# Patient Record
Sex: MALE | Race: WHITE
[De-identification: names, ages, dates, MRNs, and addresses within clinical notes are randomized per-mention and may not be internally consistent; named-entity substitution may affect disease eponyms.]

---

## 2019-06-04 ENCOUNTER — APPOINTMENT (OUTPATIENT)
Dept: CARDIOLOGY | Facility: CLINIC | Age: 84
End: 2019-06-04
Payer: MEDICARE

## 2019-06-04 ENCOUNTER — NON-APPOINTMENT (OUTPATIENT)
Age: 84
End: 2019-06-04

## 2019-06-04 VITALS
WEIGHT: 150 LBS | OXYGEN SATURATION: 95 % | HEART RATE: 68 BPM | SYSTOLIC BLOOD PRESSURE: 145 MMHG | HEIGHT: 66 IN | DIASTOLIC BLOOD PRESSURE: 82 MMHG | BODY MASS INDEX: 24.11 KG/M2

## 2019-06-04 DIAGNOSIS — I10 ESSENTIAL (PRIMARY) HYPERTENSION: ICD-10-CM

## 2019-06-04 DIAGNOSIS — R09.89 OTHER SPECIFIED SYMPTOMS AND SIGNS INVOLVING THE CIRCULATORY AND RESPIRATORY SYSTEMS: ICD-10-CM

## 2019-06-04 DIAGNOSIS — I73.9 PERIPHERAL VASCULAR DISEASE, UNSPECIFIED: ICD-10-CM

## 2019-06-04 DIAGNOSIS — E78.00 PURE HYPERCHOLESTEROLEMIA, UNSPECIFIED: ICD-10-CM

## 2019-06-04 DIAGNOSIS — I25.10 ATHEROSCLEROTIC HEART DISEASE OF NATIVE CORONARY ARTERY W/OUT ANGINA PECTORIS: ICD-10-CM

## 2019-06-04 PROCEDURE — 99204 OFFICE O/P NEW MOD 45 MIN: CPT

## 2019-06-04 PROCEDURE — 93000 ELECTROCARDIOGRAM COMPLETE: CPT

## 2019-06-04 RX ORDER — METOPROLOL SUCCINATE 25 MG/1
25 TABLET, EXTENDED RELEASE ORAL
Qty: 45 | Refills: 0 | Status: ACTIVE | COMMUNITY
Start: 2019-03-08

## 2019-06-04 NOTE — HISTORY OF PRESENT ILLNESS
[FreeTextEntry1] : I saw Hardy Gallardo in the office today for a followup visit. He is an 89-year-old white male who underwent four-vessel bypass graft surgery in 1997 with LIMA to the LAD, and vein graft to the diagonal, ramus, and obtuse marginal branches. He had a nuclear stress test in 2010 that showed no ischemia. Carotid Doppler 9/13 showed mild nonobstructing plaque, no change compared to 2008.I last saw the patient in 2014.\par \par  Blood work dated 10/14 demonstrates a total cholesterol 165, triglycerides 44, HDL 66, and LDL 90.\par \par Or recently the patient has not been walking around size because he is lazy and his difficulty with his balance. Several weeks she had a bad cough and was complaining of some indigestion type symptoms in his chest. Since the cough has resolved he is having no chest pain, shortness of breath, or cough.\par Resting twelve-lead electrocardiogram performed today demonstrates sinus rhythm. There are no acute changes.\par \par The patient had lower extremity Dopplers 9/13. This showed moderate plaque of the distal right superficial femoral artery with a 50-99% area of obstruction. Left side showed moderate plaque without obstruction. \par Echocardiogram performed 11/14 demonstrated an ejection fraction of 60%. There is mild to moderate MR and TR with PA pressure of 40.

## 2019-06-04 NOTE — PHYSICAL EXAM
[Well Groomed] : well groomed [General Appearance - Well Developed] : well developed [Normal Appearance] : normal appearance [General Appearance - Well Nourished] : well nourished [No Deformities] : no deformities [General Appearance - In No Acute Distress] : no acute distress [Normal Conjunctiva] : the conjunctiva exhibited no abnormalities [Normal Oral Mucosa] : normal oral mucosa [Normal Jugular Venous A Waves Present] : normal jugular venous A waves present [Normal Jugular Venous V Waves Present] : normal jugular venous V waves present [No Jugular Venous Prescott A Waves] : no jugular venous prescott A waves [] : no respiratory distress [Respiration, Rhythm And Depth] : normal respiratory rhythm and effort [Bowel Sounds] : normal bowel sounds [Abdomen Soft] : soft [Exaggerated Use Of Accessory Muscles For Inspiration] : no accessory muscle use [Auscultation Breath Sounds / Voice Sounds] : lungs were clear to auscultation bilaterally [Abdomen Tenderness] : non-tender [Nail Clubbing] : no clubbing of the fingernails [Abnormal Walk] : normal gait [Skin Color & Pigmentation] : normal skin color and pigmentation [Oriented To Time, Place, And Person] : oriented to person, place, and time [Impaired Insight] : insight and judgment were intact [No Anxiety] : not feeling anxious [Normal Rate] : normal [Normal S2] : normal S2 [Normal S1] : normal S1 [No Murmur] : no murmurs heard [2+] : left 2+ [1+] : right 1+ [No Abnormalities] : the abdominal aorta was not enlarged and no bruit was heard [No Pitting Edema] : no pitting edema present [S3] : no S3 [S4] : no S4 [Right Carotid Bruit] : no bruit heard over the right carotid [Left Femoral Bruit] : no bruit heard over the left femoral artery [Right Femoral Bruit] : no bruit heard over the right femoral artery [Left Carotid Bruit] : no bruit heard over the left carotid

## 2019-06-04 NOTE — DISCUSSION/SUMMARY
[FreeTextEntry1] : Clinically the patient is doing well without any signs or symptoms of active heart disease. His exam is essentially normal. ECG is unchanged.\par \par The patient needs to start doing some mild walking. If he develops any symptoms with walking he'll let me know. He will be careful with his balance  and if necessary he'll use a cane or walker.\par \par I would appreciate your sending a copy of his most recent bloodwork my review. He will schedule a carotid Doppler and echocardiogram to check on his heart structurally and to check on his vascular system. He has no leg claudication.\par \par He'll stay on his same medication. If he has any problems he will call me. Otherwise I would see him in one year.

## 2019-06-04 NOTE — REVIEW OF SYSTEMS
[Feeling Fatigued] : feeling fatigued [Urinary Frequency] : urinary frequency [Joint Pain] : joint pain [Dizziness] : dizziness [Negative] : Respiratory [Fever] : no fever [Headache] : no headache [Chills] : no chills [Earache] : no earache [Sore Throat] : no sore throat [Sinus Pressure] : no sinus pressure [Shortness Of Breath] : no shortness of breath [Chest  Pressure] : no chest pressure [Chest Pain] : no chest pain [Lower Ext Edema] : no extremity edema [Leg Claudication] : no intermittent leg claudication [Abdominal Pain] : no abdominal pain [Palpitations] : no palpitations [Nausea] : no nausea [Heartburn] : no heartburn [Change in Appetite] : no change in appetite [Hematuria] : no hematuria [Dysphagia] : no dysphagia [Skin Lesions] : no skin lesions [Skin: A Rash] : no rash: [Tremor] : no tremor was seen [Convulsions] : no convulsions [Confusion] : no confusion was observed [Excessive Thirst] : no polydipsia [Easy Bleeding] : no tendency for easy bleeding [Easy Bruising] : no tendency for easy bruising

## 2019-06-19 ENCOUNTER — APPOINTMENT (OUTPATIENT)
Dept: CARDIOLOGY | Facility: CLINIC | Age: 84
End: 2019-06-19
Payer: MEDICARE

## 2019-06-19 PROCEDURE — 93306 TTE W/DOPPLER COMPLETE: CPT

## 2019-06-25 ENCOUNTER — APPOINTMENT (OUTPATIENT)
Dept: CARDIOLOGY | Facility: CLINIC | Age: 84
End: 2019-06-25
Payer: MEDICARE

## 2019-06-25 PROCEDURE — 93880 EXTRACRANIAL BILAT STUDY: CPT

## 2022-03-27 ENCOUNTER — INPATIENT (INPATIENT)
Facility: HOSPITAL | Age: 87
LOS: 2 days | Discharge: HOME | DRG: 683 | End: 2022-03-30
Attending: HOSPITALIST | Admitting: INTERNAL MEDICINE
Payer: MEDICARE

## 2022-03-27 VITALS
HEART RATE: 72 BPM | RESPIRATION RATE: 18 BRPM | SYSTOLIC BLOOD PRESSURE: 105 MMHG | TEMPERATURE: 98 F | DIASTOLIC BLOOD PRESSURE: 62 MMHG | OXYGEN SATURATION: 93 %

## 2022-03-27 DIAGNOSIS — M62.82 RHABDOMYOLYSIS: ICD-10-CM

## 2022-03-27 LAB
ALBUMIN SERPL ELPH-MCNC: 3.3 G/DL — SIGNIFICANT CHANGE UP (ref 3.3–5)
ALP SERPL-CCNC: 67 U/L — SIGNIFICANT CHANGE UP (ref 40–120)
ALT FLD-CCNC: 29 U/L — SIGNIFICANT CHANGE UP (ref 12–78)
ANION GAP SERPL CALC-SCNC: 3 MMOL/L — LOW (ref 5–17)
APPEARANCE UR: ABNORMAL
APTT BLD: 29.5 SEC — SIGNIFICANT CHANGE UP (ref 27.5–35.5)
AST SERPL-CCNC: 85 U/L — HIGH (ref 15–37)
BASOPHILS # BLD AUTO: 0.08 K/UL — SIGNIFICANT CHANGE UP (ref 0–0.2)
BASOPHILS NFR BLD AUTO: 1 % — SIGNIFICANT CHANGE UP (ref 0–2)
BILIRUB SERPL-MCNC: 1 MG/DL — SIGNIFICANT CHANGE UP (ref 0.2–1.2)
BILIRUB UR-MCNC: NEGATIVE — SIGNIFICANT CHANGE UP
BUN SERPL-MCNC: 38 MG/DL — HIGH (ref 7–23)
CALCIUM SERPL-MCNC: 8.8 MG/DL — SIGNIFICANT CHANGE UP (ref 8.5–10.1)
CHLORIDE SERPL-SCNC: 110 MMOL/L — HIGH (ref 96–108)
CK SERPL-CCNC: 4120 U/L — HIGH (ref 26–308)
CO2 SERPL-SCNC: 27 MMOL/L — SIGNIFICANT CHANGE UP (ref 22–31)
COLOR SPEC: ABNORMAL
CREAT SERPL-MCNC: 1.54 MG/DL — HIGH (ref 0.5–1.3)
DIFF PNL FLD: ABNORMAL
EGFR: 41 ML/MIN/1.73M2 — LOW
EOSINOPHIL # BLD AUTO: 0 K/UL — SIGNIFICANT CHANGE UP (ref 0–0.5)
GLUCOSE SERPL-MCNC: 89 MG/DL — SIGNIFICANT CHANGE UP (ref 70–99)
GLUCOSE UR QL: NEGATIVE — SIGNIFICANT CHANGE UP
HCT VFR BLD CALC: 40.9 % — SIGNIFICANT CHANGE UP (ref 39–50)
HGB BLD-MCNC: 13.8 G/DL — SIGNIFICANT CHANGE UP (ref 13–17)
IMM GRANULOCYTES NFR BLD AUTO: 14 % — HIGH (ref 0–1.5)
INR BLD: 1.25 RATIO — HIGH (ref 0.88–1.16)
KETONES UR-MCNC: ABNORMAL
LACTATE SERPL-SCNC: 2.4 MMOL/L — HIGH (ref 0.7–2)
LEUKOCYTE ESTERASE UR-ACNC: ABNORMAL
LYMPHOCYTES # BLD AUTO: 0.32 K/UL — LOW (ref 1–3.3)
LYMPHOCYTES # BLD AUTO: 2 % — LOW (ref 13–44)
MCHC RBC-ENTMCNC: 32.6 PG — SIGNIFICANT CHANGE UP (ref 27–34)
MCHC RBC-ENTMCNC: 33.7 GM/DL — SIGNIFICANT CHANGE UP (ref 32–36)
MCV RBC AUTO: 96.7 FL — SIGNIFICANT CHANGE UP (ref 80–100)
MONOCYTES # BLD AUTO: 1.15 K/UL — HIGH (ref 0–0.9)
MONOCYTES NFR BLD AUTO: 7 % — SIGNIFICANT CHANGE UP (ref 2–14)
NEUTROPHILS # BLD AUTO: 22.4 K/UL — HIGH (ref 1.8–7.4)
NEUTROPHILS NFR BLD AUTO: 76 % — SIGNIFICANT CHANGE UP (ref 43–77)
NITRITE UR-MCNC: POSITIVE
PH UR: 8 — SIGNIFICANT CHANGE UP (ref 5–8)
PLATELET # BLD AUTO: 163 K/UL — SIGNIFICANT CHANGE UP (ref 150–400)
POTASSIUM SERPL-MCNC: 5.3 MMOL/L — SIGNIFICANT CHANGE UP (ref 3.5–5.3)
POTASSIUM SERPL-SCNC: 5.3 MMOL/L — SIGNIFICANT CHANGE UP (ref 3.5–5.3)
PROT SERPL-MCNC: 7 GM/DL — SIGNIFICANT CHANGE UP (ref 6–8.3)
PROT UR-MCNC: 100
PROTHROM AB SERPL-ACNC: 14.5 SEC — HIGH (ref 10.5–13.4)
RAPID RVP RESULT: SIGNIFICANT CHANGE UP
RBC # BLD: 4.23 M/UL — SIGNIFICANT CHANGE UP (ref 4.2–5.8)
RBC # FLD: 14.2 % — SIGNIFICANT CHANGE UP (ref 10.3–14.5)
SARS-COV-2 RNA SPEC QL NAA+PROBE: SIGNIFICANT CHANGE UP
SODIUM SERPL-SCNC: 140 MMOL/L — SIGNIFICANT CHANGE UP (ref 135–145)
SP GR SPEC: 1.01 — SIGNIFICANT CHANGE UP (ref 1.01–1.02)
TROPONIN I, HIGH SENSITIVITY RESULT: 154.16 NG/L — HIGH
TROPONIN I, HIGH SENSITIVITY RESULT: 182.32 NG/L — HIGH
UROBILINOGEN FLD QL: 1
WBC # BLD: 24.19 K/UL — HIGH (ref 3.8–10.5)
WBC # FLD AUTO: 24.19 K/UL — HIGH (ref 3.8–10.5)

## 2022-03-27 PROCEDURE — 73130 X-RAY EXAM OF HAND: CPT | Mod: 26,RT

## 2022-03-27 PROCEDURE — 97163 PT EVAL HIGH COMPLEX 45 MIN: CPT | Mod: GP

## 2022-03-27 PROCEDURE — 71260 CT THORAX DX C+: CPT | Mod: 26,MA

## 2022-03-27 PROCEDURE — 85027 COMPLETE CBC AUTOMATED: CPT

## 2022-03-27 PROCEDURE — 84439 ASSAY OF FREE THYROXINE: CPT

## 2022-03-27 PROCEDURE — 83735 ASSAY OF MAGNESIUM: CPT

## 2022-03-27 PROCEDURE — 93010 ELECTROCARDIOGRAM REPORT: CPT

## 2022-03-27 PROCEDURE — 97530 THERAPEUTIC ACTIVITIES: CPT | Mod: GP

## 2022-03-27 PROCEDURE — 72125 CT NECK SPINE W/O DYE: CPT | Mod: 26,MG

## 2022-03-27 PROCEDURE — 84443 ASSAY THYROID STIM HORMONE: CPT

## 2022-03-27 PROCEDURE — 70450 CT HEAD/BRAIN W/O DYE: CPT | Mod: 26,MG

## 2022-03-27 PROCEDURE — 84100 ASSAY OF PHOSPHORUS: CPT

## 2022-03-27 PROCEDURE — 82550 ASSAY OF CK (CPK): CPT

## 2022-03-27 PROCEDURE — 74177 CT ABD & PELVIS W/CONTRAST: CPT | Mod: 26,MA

## 2022-03-27 PROCEDURE — 84484 ASSAY OF TROPONIN QUANT: CPT

## 2022-03-27 PROCEDURE — 93306 TTE W/DOPPLER COMPLETE: CPT

## 2022-03-27 PROCEDURE — 99285 EMERGENCY DEPT VISIT HI MDM: CPT

## 2022-03-27 PROCEDURE — 94640 AIRWAY INHALATION TREATMENT: CPT

## 2022-03-27 PROCEDURE — 80048 BASIC METABOLIC PNL TOTAL CA: CPT

## 2022-03-27 PROCEDURE — 71045 X-RAY EXAM CHEST 1 VIEW: CPT | Mod: 26

## 2022-03-27 PROCEDURE — 84480 ASSAY TRIIODOTHYRONINE (T3): CPT

## 2022-03-27 PROCEDURE — 36415 COLL VENOUS BLD VENIPUNCTURE: CPT

## 2022-03-27 PROCEDURE — 86077 PHYS BLOOD BANK SERV XMATCH: CPT

## 2022-03-27 PROCEDURE — 87086 URINE CULTURE/COLONY COUNT: CPT

## 2022-03-27 PROCEDURE — 86870 RBC ANTIBODY IDENTIFICATION: CPT

## 2022-03-27 PROCEDURE — G1004: CPT

## 2022-03-27 PROCEDURE — 83605 ASSAY OF LACTIC ACID: CPT

## 2022-03-27 PROCEDURE — 93005 ELECTROCARDIOGRAM TRACING: CPT

## 2022-03-27 PROCEDURE — 99223 1ST HOSP IP/OBS HIGH 75: CPT

## 2022-03-27 PROCEDURE — 73564 X-RAY EXAM KNEE 4 OR MORE: CPT | Mod: 26,RT

## 2022-03-27 PROCEDURE — 97116 GAIT TRAINING THERAPY: CPT | Mod: GP

## 2022-03-27 RX ORDER — SODIUM CHLORIDE 9 MG/ML
1000 INJECTION INTRAMUSCULAR; INTRAVENOUS; SUBCUTANEOUS ONCE
Refills: 0 | Status: COMPLETED | OUTPATIENT
Start: 2022-03-27 | End: 2022-03-27

## 2022-03-27 RX ORDER — KETOROLAC TROMETHAMINE 0.5 %
1 DROPS OPHTHALMIC (EYE)
Refills: 0 | Status: DISCONTINUED | OUTPATIENT
Start: 2022-03-27 | End: 2022-03-30

## 2022-03-27 RX ORDER — TETANUS TOXOID, REDUCED DIPHTHERIA TOXOID AND ACELLULAR PERTUSSIS VACCINE, ADSORBED 5; 2.5; 8; 8; 2.5 [IU]/.5ML; [IU]/.5ML; UG/.5ML; UG/.5ML; UG/.5ML
0.5 SUSPENSION INTRAMUSCULAR ONCE
Refills: 0 | Status: COMPLETED | OUTPATIENT
Start: 2022-03-27 | End: 2022-03-27

## 2022-03-27 RX ORDER — ASPIRIN/CALCIUM CARB/MAGNESIUM 324 MG
81 TABLET ORAL DAILY
Refills: 0 | Status: DISCONTINUED | OUTPATIENT
Start: 2022-03-27 | End: 2022-03-30

## 2022-03-27 RX ORDER — FLUOROMETHOLONE 1 MG/ML
1 SOLUTION/ DROPS OPHTHALMIC
Qty: 0 | Refills: 0 | DISCHARGE

## 2022-03-27 RX ORDER — HEPARIN SODIUM 5000 [USP'U]/ML
5000 INJECTION INTRAVENOUS; SUBCUTANEOUS EVERY 12 HOURS
Refills: 0 | Status: DISCONTINUED | OUTPATIENT
Start: 2022-03-27 | End: 2022-03-28

## 2022-03-27 RX ORDER — CEFTRIAXONE 500 MG/1
1000 INJECTION, POWDER, FOR SOLUTION INTRAMUSCULAR; INTRAVENOUS ONCE
Refills: 0 | Status: COMPLETED | OUTPATIENT
Start: 2022-03-27 | End: 2022-03-27

## 2022-03-27 RX ORDER — TAMSULOSIN HYDROCHLORIDE 0.4 MG/1
1 CAPSULE ORAL
Qty: 0 | Refills: 0 | DISCHARGE

## 2022-03-27 RX ORDER — KETOROLAC TROMETHAMINE 0.5 %
1 DROPS OPHTHALMIC (EYE)
Qty: 0 | Refills: 0 | DISCHARGE

## 2022-03-27 RX ORDER — METOPROLOL TARTRATE 50 MG
0.5 TABLET ORAL
Qty: 0 | Refills: 0 | DISCHARGE

## 2022-03-27 RX ORDER — SIMVASTATIN 20 MG/1
40 TABLET, FILM COATED ORAL AT BEDTIME
Refills: 0 | Status: DISCONTINUED | OUTPATIENT
Start: 2022-03-27 | End: 2022-03-30

## 2022-03-27 RX ORDER — TOBRAMYCIN AND DEXAMETHASONE 1; 3 MG/ML; MG/ML
2 SUSPENSION/ DROPS OPHTHALMIC
Qty: 0 | Refills: 0 | DISCHARGE

## 2022-03-27 RX ORDER — TAMSULOSIN HYDROCHLORIDE 0.4 MG/1
0.4 CAPSULE ORAL AT BEDTIME
Refills: 0 | Status: DISCONTINUED | OUTPATIENT
Start: 2022-03-27 | End: 2022-03-30

## 2022-03-27 RX ORDER — METOPROLOL TARTRATE 50 MG
12.5 TABLET ORAL DAILY
Refills: 0 | Status: DISCONTINUED | OUTPATIENT
Start: 2022-03-27 | End: 2022-03-30

## 2022-03-27 RX ORDER — ASPIRIN/CALCIUM CARB/MAGNESIUM 324 MG
1 TABLET ORAL
Qty: 0 | Refills: 0 | DISCHARGE

## 2022-03-27 RX ORDER — SODIUM CHLORIDE 9 MG/ML
1000 INJECTION INTRAMUSCULAR; INTRAVENOUS; SUBCUTANEOUS
Refills: 0 | Status: DISCONTINUED | OUTPATIENT
Start: 2022-03-27 | End: 2022-03-28

## 2022-03-27 RX ORDER — CEFTRIAXONE 500 MG/1
1000 INJECTION, POWDER, FOR SOLUTION INTRAMUSCULAR; INTRAVENOUS EVERY 24 HOURS
Refills: 0 | Status: DISCONTINUED | OUTPATIENT
Start: 2022-03-28 | End: 2022-03-30

## 2022-03-27 RX ADMIN — TAMSULOSIN HYDROCHLORIDE 0.4 MILLIGRAM(S): 0.4 CAPSULE ORAL at 22:12

## 2022-03-27 RX ADMIN — Medication 1 DROP(S): at 23:09

## 2022-03-27 RX ADMIN — SIMVASTATIN 40 MILLIGRAM(S): 20 TABLET, FILM COATED ORAL at 22:12

## 2022-03-27 RX ADMIN — SODIUM CHLORIDE 1000 MILLILITER(S): 9 INJECTION INTRAMUSCULAR; INTRAVENOUS; SUBCUTANEOUS at 16:54

## 2022-03-27 RX ADMIN — SODIUM CHLORIDE 1000 MILLILITER(S): 9 INJECTION INTRAMUSCULAR; INTRAVENOUS; SUBCUTANEOUS at 16:18

## 2022-03-27 RX ADMIN — CEFTRIAXONE 100 MILLIGRAM(S): 500 INJECTION, POWDER, FOR SOLUTION INTRAMUSCULAR; INTRAVENOUS at 16:54

## 2022-03-27 RX ADMIN — TETANUS TOXOID, REDUCED DIPHTHERIA TOXOID AND ACELLULAR PERTUSSIS VACCINE, ADSORBED 0.5 MILLILITER(S): 5; 2.5; 8; 8; 2.5 SUSPENSION INTRAMUSCULAR at 16:28

## 2022-03-27 NOTE — ED ADULT NURSE NOTE - OBJECTIVE STATEMENT
Pt presents to ED with daughter s/p fall. Pt was found by daughter at around 130pm today and daughter states that patient fell at midnight today and has been on the floor since. Pt was found on the wooden floor in bed room next to his bed. Pt states that he got out of his bed and fell. Abrasions noted to right hand and right knee. Pt said he felt too weak to get up. Denies any pain, SOB, chest pain, fever/chills, dizziness. Pt on ASA.

## 2022-03-27 NOTE — ED PROVIDER NOTE - PROGRESS NOTE DETAILS
89 y/o M presents s/p fall. Pt reports falling at midnight last night, does not recall how he fell. Was too weak to get out of bed on his own. Daughter found patient on the floor at 1330 today. Pt with abrasion to R hand and R knee. No other complaints at this time -Luis Richter PA-C Discussed with Dr. baez who will admit. -Luis Richter PA-C

## 2022-03-27 NOTE — ED ADULT TRIAGE NOTE - BP NONINVASIVE SYSTOLIC (MM HG)
[facility], Emergency Department    [address] [address]    Phone:  [phone]                                       Kwasi Castellanos   MRN: [id number]    Department:  [facility], Emergency Department   Date of Visit:  11/11/2017           Patient Information     Date Of Birth          1992        Your diagnoses for this visit were:     Facial laceration, initial encounter        You were seen by Brien Trujillo MD.      Follow-up Information     Follow up with [facility], Emergency Department In 5 days.    Specialty:  EMERGENCY MEDICINE    Why:  For suture removal    Contact information:    [address] [id number] [phone]    Additional information:    [facility] is located on the corner of [address] on the [facility]. It is easily accessible from virtually any point in the [facility] area, via [address] and [address].        Discharge Instructions           *LACERATION (All: sutures, staples, tape, glue)  A laceration is a cut through the skin. This will usually require stitches (sutures) or staples if it is deep. Minor cuts may be treated with a tape closure ( Steri-Strips ) or Dermabond skin glue.    HOME CARE:  1. EXTREMITY, FACE or TRUNK WOUNDS: Keep the wound clean and dry. If a bandage was applied and it becomes wet or dirty, replace it. Otherwise, leave it in place for the first 24 hours.    If stitches or staples were used, clean the wound daily. Protect the wound from sunlight and tanning lamps.    After removing the bandage, wash the area with soap and water. Use a wet cotton swab (Q tip) to loosen and remove any blood or crust that forms.    After cleaning, apply a thin layer of Polysporin or Bacitracin ointment. This will keep the wound clean and make it easier to remove the stitches or staples. Reapply a fresh bandage.    You may remove the bandage to shower as usual after the  first 24 hours, but do not soak the area in water (no swimming) until the stitches or staples are removed.    If Steri-Strips were used, keep the area clean and dry. If it becomes wet, blot it dry with a towel. It is okay to take a brief shower, but avoid scrubbing the area.    If Dermabond skin adhesive was used, do not scratch, rub or pick at the adhesive film. Do not place tape directly over the film. Do not apply liquid, ointment or creams to the wound while the film is in place. Do not clean the wound with peroxide and do not apply ointments. Avoid activities that cause heavy sweating until the film has fallen off. Protect the wound from prolonged exposure to sunlight or tanning lamps. You may shower as usual but do not soak the wound in water (no baths or swimming). The film will fall off by itself in 5-10 days.  2. SCALP WOUNDS: During the first two days, you may carefully rinse your hair in the shower to remove blood, glass or dirt particles. After two days, you may shower and shampoo your hair normally. Do not soak your scalp in the tub or go swimming until the stitches or staples have been removed.  3. MOUTH WOUNDS: Eat soft foods to reduce pain. If the cut is inside of your mouth, clean by rinsing after each meal and at bedtime with a mixture of equal parts water and Hydrogen Peroxide (do not swallow!). Or, you can use a cotton swab to directly apply Hydrogen Peroxide onto the cut.  4. You may use acetaminophen (Tylenol) 650-1000 mg every 6 hours or ibuprofen (Motrin, Advil) 600 mg every 6-8 hours with food to control pain, if you are able to take these medicines. [NOTE: If you have chronic liver or kidney disease or ever had a stomach ulcer or GI bleeding, talk with your doctor before using these medicines.]  Use sunscreen on the area for 6 months after the wound heals to keep the scar from getting darker.   FOLLOW UP: Most skin wounds heal within ten days. Mouth and facial wounds heal within five days.  However, even with proper treatment, a wound infection may sometimes occur. Therefore, you should check the wound daily for signs of infection listed below.  Stitches should be removed from the face within five days. Unless you are told to come back to the emergency room, you may have your doctor or urgent care remove the stitches. If dissolving stitches were used in the mouth, these will fall out or dissolve without the need for removal. If tape closures ( Steri-Strips ) were used, remove them yourself if they have not fallen off after 7 days. If Dermabond skin glue was used, the film will fall off by itself in 5-10 days.   GET PROMPT MEDICAL ATTENTION if any of the following occur:    Increasing pain in the wound    Redness, swelling or pus coming from the wound    Fever over 101 F (38.3 C) oral    If stitches or staples come apart or fall out or if Steri-Strips fall off before seven days    If the wound edges re-open    Bleeding not controlled by direct pressure    3042-4744 [employer], [address] [place] [address]. All rights reserved. This information is not intended as a substitute for professional medical care. Always follow your healthcare professional's instructions.      24 Hour Appointment Hotline       To make an appointment at any [facility], call [phone] ([phone]). If you don't have a family doctor or clinic, we will help you find one. [facility] are conveniently located to serve the needs of you and your family.             Review of your medicines      Notice     You have not been prescribed any medications.            Orders Needing Specimen Collection     None      Pending Results     No orders found from 11/9/2017 to 11/12/2017.            Pending Culture Results     No orders found from 11/9/2017 to 11/12/2017.            Pending Results Instructions     If you had any lab results that were not finalized at the time of your Discharge, you can call  "the ED Lab Result RN at [phone]. You will be contacted by this team for any positive Lab results or changes in treatment. The nurses are available 7 days a week from 10A to 6:30P.  You can leave a message 24 hours per day and they will return your call.        Thank you for choosing [facility]       Thank you for choosing [facility] for your care. Our goal is always to provide you with excellent care. Hearing back from our patients is one way we can continue to improve our services. Please take a few minutes to complete the written survey that you may receive in the mail after you visit with us. Thank you!        [employer] Information     [employer] lets you send messages to your doctor, view your test results, renew your prescriptions, schedule appointments and more. To sign up, go to [email or web address] . Click on \"Log in\" on the left side of the screen, which will take you to the Welcome page. Then click on \"Sign up Now\" on the right side of the page.     You will be asked to enter the access code listed below, as well as some personal information. Please follow the directions to create your username and password.     Your access code is: [email or web address]  Expires: 2018  6:36 PM     Your access code will  in 90 days. If you need help or a new code, please call your [facility] clinic or [phone].        Care EveryWhere ID     This is your Care EveryWhere ID. This could be used by other organizations to access your [facility] medical records  [id number]        Equal Access to Services     MOE MARTIN : Hadii kiki ku hadasho Sochristianaali, waaxda luqadaha, qaybta kaalmada adeegyada, kahlil rivas . So [facility] [phone].    ATENCIÓN: Si habla español, tiene a diallo disposición servicios gratuitos de asistencia lingüística. Llame al [phone].    We comply with applicable federal civil rights laws and [place] laws. We do not discriminate on the basis of race, color, national origin, " age, disability, sex, sexual orientation, or gender identity.            After Visit Summary       This is your record. Keep this with you and show to your community pharmacist(s) and doctor(s) at your next visit.                   105

## 2022-03-27 NOTE — ED ADULT TRIAGE NOTE - NS ED NURSE AMBULANCES
[facility] interpretation of diagnostic studies/consultation with other physicians/conducted a detailed discussion of DNR status/consult w/ pt's family directly relating to pts condition/additional history taking/direct patient care (not related to procedure)/documentation

## 2022-03-27 NOTE — ED PROVIDER NOTE - SKIN, MLM
Skin normal color for race, warm, dry and intact. No evidence of rash. Superficial skin tear right wrist, anterior knee. No active bleeding.

## 2022-03-27 NOTE — ED PROVIDER NOTE - CARE PLAN
1 Principal Discharge DX:	Weakness  Secondary Diagnosis:	Fall   Principal Discharge DX:	Rhabdomyolysis  Secondary Diagnosis:	Fall  Secondary Diagnosis:	JOSETTE (acute kidney injury)  Secondary Diagnosis:	Elevated troponin  Secondary Diagnosis:	Weakness   Principal Discharge DX:	Rhabdomyolysis  Secondary Diagnosis:	Fall  Secondary Diagnosis:	JOSETTE (acute kidney injury)  Secondary Diagnosis:	Elevated troponin  Secondary Diagnosis:	Weakness  Secondary Diagnosis:	Acute UTI (urinary tract infection)

## 2022-03-27 NOTE — H&P ADULT - HISTORY OF PRESENT ILLNESS
89/M with PMHx of hld, htn, pvd, atherosclerosis, CAD s/p CABG, was brought to ED from home s/p fall. Daughter states patient fell last night and was not able to get up. Patient was found on the floor when daughter visited early afternoon today. He has multiple abrasion on right knee and right wrist. Daughter states patient lives alone and was supposed to go see his girlfriend today but didn't go. Girlfriend called patient but no answer and then daughter called patient and no answer. When daughter visited today, patient was found on the wooden floor of his bedroom. He does not know if he passed out. Denies any chest pain.     Trauma work up neg

## 2022-03-27 NOTE — H&P ADULT - NSHPPHYSICALEXAM_GEN_ALL_CORE
PHYSICAL EXAM:    Daily     Daily     Vital Signs Last 24 Hrs  T(C): 37.2 (27 Mar 2022 18:20), Max: 37.2 (27 Mar 2022 18:20)  T(F): 99 (27 Mar 2022 18:20), Max: 99 (27 Mar 2022 18:20)  HR: 58 (27 Mar 2022 18:20) (58 - 72)  BP: 145/66 (27 Mar 2022 18:20) (105/62 - 145/66)  BP(mean): 83 (27 Mar 2022 17:50) (83 - 83)  RR: 18 (27 Mar 2022 18:20) (18 - 18)  SpO2: 99% (27 Mar 2022 18:20) (93% - 99%)    Constitutional: Weak  appearing  HEENT: Atraumatic, PRESTON, Normal, No congestion  Respiratory: Breath Sounds normal, no rhonchi/wheeze  Cardiovascular: N S1S2;   Gastrointestinal: Abdomen soft, non tender, Bowel Sounds present  Extremities: No edema, peripheral pulses present  Neurological: AAO x 3, no gross focal motor deficits  Skin: Non cellulitic, no rash, ulcers  Lymph Nodes: No lymphadenopathy noted  Back: No CVA tenderness   Musculoskeletal: multiple bruises  and skin tears over b/l knees, right wrist  Breasts: Deferred  Genitourinary: deferred  Rectal: Deferred

## 2022-03-27 NOTE — ED PROVIDER NOTE - OBJECTIVE STATEMENT
89 y/o M with PMHx of hld, htn, pvd, atherosclerosis, cad, presents to ED brought in by ambulance from home s/p fall. Daughter states patient fell last night and was not able to get up. Patient was found on the floor when daughter visited early afternoon today. +abrasion on right knee and right wrist. Daughter states patient lives alone and was supposed to go see his girlfriend today but didn't go. Girlfriend called patient but no answer and then daughter called patient and no answer. When daughter visited today, patient was found on the wooden floor of his bedroom. Patient states he was trying to get out of bed last night (close to midnight) but fell to the ground. Patient felt very weak and was unable to get up from the ground. Patient did not hit head, no LOC. Back hit the floor. Patient was not wearing his medical alert bracelet at the time. Currently, patient states his back hurts. No SOB, no nausea, no fever, no neck or head pain.

## 2022-03-27 NOTE — PROVIDER CONTACT NOTE (OTHER) - DATE AND TIME:
Pt needs sooner than 1st available appt for 2 lesions on her forehead. Pt said she has had these for several weeks and is concerned. Pt said ok to leave a message.  [phone] 27-Mar-2022 22:37

## 2022-03-27 NOTE — ED PROVIDER NOTE - ATTENDING CONTRIBUTION TO CARE
BRYSON Castro MD, ED Attending physician:  This was a shared visit with CHIP.  I reviewed and verified the documentation and independently performed the documented history/exam/mdm.

## 2022-03-27 NOTE — ED PROVIDER NOTE - NEUROLOGICAL, MLM
Alert and oriented, no focal deficits, no motor or sensory deficits. CN intact. AOx3. No focal neuro/motor deficits, normal speech.

## 2022-03-27 NOTE — ED PROVIDER NOTE - MUSCULOSKELETAL, MLM
Spine appears normal, range of motion is not limited, no muscle or joint tenderness. Neck nontender, back/chest wall/pelvis nontender, stable. Maex4.

## 2022-03-27 NOTE — ED ADULT TRIAGE NOTE - CHIEF COMPLAINT QUOTE
pt presents to ED brought in by ambulance from home due to fall at home pt was found on the floor by his daughter this AM pt states he was unable to get up small skin tear to right hand and right shin TA called pt on ASA

## 2022-03-27 NOTE — ED PROVIDER NOTE - NSICDXPASTMEDICALHX_GEN_ALL_CORE_FT
PAST MEDICAL HISTORY:  Atherosclerosis     CAD (coronary artery disease)     HLD (hyperlipidemia)     HTN (hypertension)     PVD (peripheral vascular disease)

## 2022-03-27 NOTE — H&P ADULT - NSHPLABSRESULTS_GEN_ALL_CORE
All Labs/EKG/Radiology/Meds reviewed by me.     Lab Results:  CBC  CBC Full  -  ( 27 Mar 2022 15:23 )  WBC Count : 24.19 K/uL  RBC Count : 4.23 M/uL  Hemoglobin : 13.8 g/dL  Hematocrit : 40.9 %  Platelet Count - Automated : 163 K/uL  Mean Cell Volume : 96.7 fl  Mean Cell Hemoglobin : 32.6 pg  Mean Cell Hemoglobin Concentration : 33.7 gm/dL  Auto Neutrophil # : 22.40 K/uL  Auto Lymphocyte # : 0.32 K/uL  Auto Monocyte # : 1.15 K/uL  Auto Eosinophil # : 0.00 K/uL  Auto Basophil # : 0.08 K/uL  Auto Neutrophil % : 76.0 %  Auto Lymphocyte % : 2.0 %  Auto Monocyte % : 7.0 %  Auto Eosinophil % : x  Auto Basophil % : 1.0 %    .		Differential:	[] Automated		[] Manual  Chemistry                        13.8   24.19 )-----------( 163      ( 27 Mar 2022 15:23 )             40.9     03-27    140  |  110<H>  |  38<H>  ----------------------------<  89  5.3   |  27  |  1.54<H>    Ca    8.8      27 Mar 2022 15:23    TPro  7.0  /  Alb  3.3  /  TBili  1.0  /  DBili  x   /  AST  85<H>  /  ALT  29  /  AlkPhos  67  03-27    LIVER FUNCTIONS - ( 27 Mar 2022 15:23 )  Alb: 3.3 g/dL / Pro: 7.0 gm/dL / ALK PHOS: 67 U/L / ALT: 29 U/L / AST: 85 U/L / GGT: x           PT/INR - ( 27 Mar 2022 15:23 )   PT: 14.5 sec;   INR: 1.25 ratio         PTT - ( 27 Mar 2022 15:23 )  PTT:29.5 sec  Urinalysis Basic - ( 27 Mar 2022 14:57 )    Color: Darlene / Appearance: very cloudy / S.010 / pH: x  Gluc: x / Ketone: Trace  / Bili: Negative / Urobili: 1   Blood: x / Protein: 100 / Nitrite: Positive   Leuk Esterase: Moderate / RBC: 11-25 /HPF / WBC >50   Sq Epi: x / Non Sq Epi: Negative / Bacteria: Occasional    EKG: nsr     RADIOLOGY RESULTS:    < from: CT Head No Cont (22 @ 15:44) >    CT HEAD: Mild periventricular white matter ischemia with 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 Moderate atrophy.    CT cervical spine:   No vertebral fracture is recognized.  Moderate   degenerative disc disease and spondylosis at C4-5 and severe at C5-6 and   C6-7 withloss of disc height and associated degenerative endplate   changes. There is narrowing of the RIGHT C2-3, BILATERAL C3-4, C4-5 and   C5-6 and C6-7 neural foramina due to uncovertebral spurring and facet   osteophytic hypertrophy.    < end of copied text >    < from: CT Abdomen and Pelvis w/ IV Cont (22 @ 15:46) >    IMPRESSION: No sequela of trauma.    Prominent left-sided bladder diverticulum. Enlarged prostate.    < end of copied text >        MEDICATIONS  (STANDING):  aspirin  chewable 81 milliGRAM(s) Oral daily  heparin   Injectable 5000 Unit(s) SubCutaneous every 12 hours  ketorolac 0.5% Ophthalmic Solution 1 Drop(s) Both EYES four times a day  metoprolol succinate ER 12.5 milliGRAM(s) Oral daily  simvastatin 40 milliGRAM(s) Oral at bedtime  sodium chloride 0.9%. 1000 milliLiter(s) (50 mL/Hr) IV Continuous <Continuous>  tamsulosin 0.4 milliGRAM(s) Oral at bedtime    MEDICATIONS  (PRN):

## 2022-03-27 NOTE — ED PROVIDER NOTE - NS ED ATTENDING STATEMENT MOD
This was a shared visit with the CHIP. I reviewed and verified the documentation and independently performed the documented:

## 2022-03-27 NOTE — ED PROVIDER NOTE - ENMT, MLM
Airway patent, Nasal mucosa clear. Mouth with normal mucosa. Throat has no vesicles, no oropharyngeal exudates and uvula is midline.   Oropharynx clear, mucous membranes mildly dry. No clinical evidence of facial injury.

## 2022-03-27 NOTE — ED PROVIDER NOTE - CLINICAL SUMMARY MEDICAL DECISION MAKING FREE TEXT BOX
89 y/o M with PMHx of HLD, HTN, PVD, atherosclerosis, cad on aspirin 81 mg daily, BIBA from home s/p fall sometime last night due to generalized weakness, unable to get up. +superficial abrasions but otherwise able to move all extremities without pain. Afebrile, neuro intact. Trauma alert initiated. Plan: PAN scan, labs, ekg, x-ray, monitor, observe, reassess. May likely require admission.

## 2022-03-27 NOTE — ED PROVIDER NOTE - CONSTITUTIONAL, MLM
normal... elderly, white male, awake, alert, oriented to person, place, time/situation and in no apparent distress.

## 2022-03-27 NOTE — H&P ADULT - ASSESSMENT
89/M with PMHx of hld, htn, pvd, atherosclerosis, CAD s/p CABG, was brought to ED from home s/p fall. Daughter states patient fell last night and was not able to get up. Patient was found on the floor when daughter visited early afternoon today. He has multiple abrasion on right knee and right wrist. Daughter states patient lives alone and was supposed to go see his girlfriend today but didn't go. Girlfriend called patient but no answer and then daughter called patient and no answer. When daughter visited today, patient was found on the wooden floor of his bedroom. He does not know if he passed out. Denies any chest pain.     Trauma work up     Pt admitted with :    1) Fall + ? syncope + abn trops:  admit to tele  fall precautions  echo  serial trops  cardio consult  asa, BB, statin    2) ? JOSETTE:  iv fluids  BMP in am    3) UTI: iv Ceftriaxone  urine cx    4) Elevated CPK: iv fluids  recheck in am    5) Leukocytosis: from fall and uti  recheck    6) Elevated lactate: no sepsis  iv fluids  repeat    7) CAD s/p CABG:  cont asa, BB, statin    8) DVT PPX: heparin s/q    poc discussed with pt, his daughter, Mathew, team

## 2022-03-27 NOTE — PATIENT PROFILE ADULT - FALL HARM RISK - HARM RISK INTERVENTIONS
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
## 2022-03-27 NOTE — ED ADULT TRIAGE NOTE - HISTORY OF COVID-19 VACCINATION
Excision Intake                                                    There were no vitals taken for this visit.    Do you take the following medications:  Coumadin, Eliquis, Pradaxa, Xarelto:   NO  -If on Coumadin, INR should be checked within 7 days of surgery.  Range is 3.5 or less or within therapeutic range.  Antibiotic Prophylaxis:  NO    Do you have an iodine allergy:  NO    Past Medical History                                                    Do you currently or have you previously had any of the following conditions:       HIV/AIDS:  NO    Hepatitis:  NO    Suppressed Immune System:  NO    Autoimmune disorder (eg RA, Lupus):  NO    Fever blisters/herpes, cold sores:  NO    Kidney disease:  NO  Creatinine   Date Value Ref Range Status   01/13/2015 0.97 0.66 - 1.25 mg/dL Final   ]    Pacemaker or Defibrillator:  NO.      History of artificial or heart valve replacement:  NO.      Endocarditis: NO    Organ transplant: NO.     Joint replacement within past 2 years: NO    Previous prosthetic joint infection: NO    Diabetes: NO    Pregnant:: N/A    Tobacco use:  NO.    History   Smoking Status     Never Smoker   Smokeless Tobacco     Never Used     Reviewed by:  Caridad Dorman LPN     Yes

## 2022-03-28 DIAGNOSIS — R55 SYNCOPE AND COLLAPSE: ICD-10-CM

## 2022-03-28 DIAGNOSIS — R77.8 OTHER SPECIFIED ABNORMALITIES OF PLASMA PROTEINS: ICD-10-CM

## 2022-03-28 LAB
ADD ON TEST-SPECIMEN IN LAB: SIGNIFICANT CHANGE UP
ANION GAP SERPL CALC-SCNC: 4 MMOL/L — LOW (ref 5–17)
BUN SERPL-MCNC: 38 MG/DL — HIGH (ref 7–23)
CALCIUM SERPL-MCNC: 8.1 MG/DL — LOW (ref 8.5–10.1)
CHLORIDE SERPL-SCNC: 112 MMOL/L — HIGH (ref 96–108)
CK SERPL-CCNC: CRITICAL HIGH U/L (ref 26–308)
CO2 SERPL-SCNC: 25 MMOL/L — SIGNIFICANT CHANGE UP (ref 22–31)
CREAT SERPL-MCNC: 1.2 MG/DL — SIGNIFICANT CHANGE UP (ref 0.5–1.3)
EGFR: 55 ML/MIN/1.73M2 — LOW
GLUCOSE SERPL-MCNC: 80 MG/DL — SIGNIFICANT CHANGE UP (ref 70–99)
HCT VFR BLD CALC: 36.9 % — LOW (ref 39–50)
HGB BLD-MCNC: 12.4 G/DL — LOW (ref 13–17)
MCHC RBC-ENTMCNC: 32.3 PG — SIGNIFICANT CHANGE UP (ref 27–34)
MCHC RBC-ENTMCNC: 33.6 GM/DL — SIGNIFICANT CHANGE UP (ref 32–36)
MCV RBC AUTO: 96.1 FL — SIGNIFICANT CHANGE UP (ref 80–100)
PLATELET # BLD AUTO: 121 K/UL — LOW (ref 150–400)
POTASSIUM SERPL-MCNC: 4.1 MMOL/L — SIGNIFICANT CHANGE UP (ref 3.5–5.3)
POTASSIUM SERPL-SCNC: 4.1 MMOL/L — SIGNIFICANT CHANGE UP (ref 3.5–5.3)
RBC # BLD: 3.84 M/UL — LOW (ref 4.2–5.8)
RBC # FLD: 14.4 % — SIGNIFICANT CHANGE UP (ref 10.3–14.5)
SODIUM SERPL-SCNC: 141 MMOL/L — SIGNIFICANT CHANGE UP (ref 135–145)
T4 FREE SERPL-MCNC: 1.1 NG/DL — SIGNIFICANT CHANGE UP (ref 0.76–1.46)
TSH SERPL-MCNC: 0.22 UU/ML — LOW (ref 0.34–4.82)
WBC # BLD: 14.38 K/UL — HIGH (ref 3.8–10.5)
WBC # FLD AUTO: 14.38 K/UL — HIGH (ref 3.8–10.5)

## 2022-03-28 PROCEDURE — 99233 SBSQ HOSP IP/OBS HIGH 50: CPT

## 2022-03-28 PROCEDURE — 99223 1ST HOSP IP/OBS HIGH 75: CPT

## 2022-03-28 PROCEDURE — 93010 ELECTROCARDIOGRAM REPORT: CPT

## 2022-03-28 PROCEDURE — 93306 TTE W/DOPPLER COMPLETE: CPT | Mod: 26

## 2022-03-28 RX ORDER — SODIUM CHLORIDE 9 MG/ML
1000 INJECTION INTRAMUSCULAR; INTRAVENOUS; SUBCUTANEOUS ONCE
Refills: 0 | Status: COMPLETED | OUTPATIENT
Start: 2022-03-28 | End: 2022-03-28

## 2022-03-28 RX ORDER — SODIUM CHLORIDE 9 MG/ML
1000 INJECTION INTRAMUSCULAR; INTRAVENOUS; SUBCUTANEOUS
Refills: 0 | Status: DISCONTINUED | OUTPATIENT
Start: 2022-03-28 | End: 2022-03-29

## 2022-03-28 RX ADMIN — Medication 81 MILLIGRAM(S): at 09:26

## 2022-03-28 RX ADMIN — Medication 1 DROP(S): at 12:04

## 2022-03-28 RX ADMIN — CEFTRIAXONE 100 MILLIGRAM(S): 500 INJECTION, POWDER, FOR SOLUTION INTRAMUSCULAR; INTRAVENOUS at 17:23

## 2022-03-28 RX ADMIN — SODIUM CHLORIDE 150 MILLILITER(S): 9 INJECTION INTRAMUSCULAR; INTRAVENOUS; SUBCUTANEOUS at 13:21

## 2022-03-28 RX ADMIN — SIMVASTATIN 40 MILLIGRAM(S): 20 TABLET, FILM COATED ORAL at 20:59

## 2022-03-28 RX ADMIN — SODIUM CHLORIDE 500 MILLILITER(S): 9 INJECTION INTRAMUSCULAR; INTRAVENOUS; SUBCUTANEOUS at 11:03

## 2022-03-28 RX ADMIN — SODIUM CHLORIDE 150 MILLILITER(S): 9 INJECTION INTRAMUSCULAR; INTRAVENOUS; SUBCUTANEOUS at 20:58

## 2022-03-28 RX ADMIN — TAMSULOSIN HYDROCHLORIDE 0.4 MILLIGRAM(S): 0.4 CAPSULE ORAL at 20:59

## 2022-03-28 RX ADMIN — Medication 1 DROP(S): at 17:24

## 2022-03-28 RX ADMIN — Medication 1 DROP(S): at 23:40

## 2022-03-28 RX ADMIN — Medication 1 DROP(S): at 05:52

## 2022-03-28 RX ADMIN — HEPARIN SODIUM 5000 UNIT(S): 5000 INJECTION INTRAVENOUS; SUBCUTANEOUS at 09:26

## 2022-03-28 NOTE — CONSULT NOTE ADULT - PROBLEM SELECTOR RECOMMENDATION 2
Minimally elevated w/ minimal change.  Await echo.  if normal EF w/ no RWMA   no further workup in this 89 yr pt, likely demand ischemia.

## 2022-03-28 NOTE — CONSULT NOTE ADULT - PROBLEM SELECTOR RECOMMENDATION 9
Pt appears to be a reliable historian & denies syncope.  Cont. tele x 24 hrs.  Await echo.  if no abnormalities no further workup.

## 2022-03-28 NOTE — CONSULT NOTE ADULT - SUBJECTIVE AND OBJECTIVE BOX
89/M with PMHx of     hld, htn, pvd, atherosclerosis, CAD s/p CABG,     was brought to ED from home s/p fall.     Daughter states patient fell last night and was   not able to get up. Patient was found on the floor   when daughter visited early afternoon today.   He has multiple abrasion on right knee and right wrist.   Daughter states patient lives alone and was supposed   to go see his girlfriend today but didn't go.   Girlfriend called patient but no answer and then daughter   called patient and no answer. When daughter   visited today, patient was found on the wooden floor of his bedroom.     He does not know if he passed out. Denies any chest pain.     Trauma work up neg    Cardiology consulted for  "? syncope, abn trop"  Pt denies any loss of consciousness.  He clearly states that he slid off his bed fell to the floor   & could not get up.  Appears to be a reliable historian.  He has a cardiologist in [place] but does   not remember his name.    PAST MEDICAL AND SURGICAL HISTORY:  PAST MEDICAL & SURGICAL HISTORY:      ALLERGIES:  Allergies    No Known Allergies    Intolerances        SOCIAL HISTORY:  Social History:  non smoker  drinks wine (27 Mar 2022 18:49)      FAMILY  HISTORY:  FAMILY HISTORY:  Family history of premature CAD (Father)        MEDICATIONS:  OUTPATIENT:  Home Medications:  aspirin 81 mg oral tablet: 1 tab(s) orally once a day (27 Mar 2022 18:56)  ketorolac 0.5% ophthalmic solution: 1 drop(s) to each affected eye 4 times a day (27 Mar 2022 18:56)  metoprolol succinate 25 mg oral tablet, extended release: 0.5 tab(s) orally once a day (27 Mar 2022 18:56)  simvastatin 40 mg oral tablet: 1 tab(s) orally once a day (at bedtime) (27 Mar 2022 18:56)  tamsulosin 0.4 mg oral capsule: 1 cap(s) orally once a day (27 Mar 2022 18:56)      INPATIENT:  MEDICATIONS  (STANDING):  aspirin  chewable 81 milliGRAM(s) Oral daily  cefTRIAXone   IVPB 1000 milliGRAM(s) IV Intermittent every 24 hours  ketorolac 0.5% Ophthalmic Solution 1 Drop(s) Both EYES four times a day  metoprolol succinate ER 12.5 milliGRAM(s) Oral daily  simvastatin 40 milliGRAM(s) Oral at bedtime  sodium chloride 0.9%. 1000 milliLiter(s) (150 mL/Hr) IV Continuous <Continuous>  tamsulosin 0.4 milliGRAM(s) Oral at bedtime    MEDICATIONS  (PRN):    MEDICATIONS  (PRN):    REVIEW OF SYSTEMS:  ===============================  ===============================  CONSTITUTIONAL: No weakness, fevers or chills  EYES/ENT: No visual changes;  No vertigo or throat pain   NECK: No pain or stiffness  RESPIRATORY: No cough, wheezing, hemoptysis; No shortness of breath  CARDIOVASCULAR: No chest pain or palpitations  GASTROINTESTINAL: No abdominal or epigastric pain. No nausea, vomiting, or hematemesis;   No diarrhea or constipation. No melena or hematochezia.  GENITOURINARY: No dysuria, frequency or hematuria  NEUROLOGICAL: No numbness or weakness  SKIN: No itching, burning, rashes, or lesions   All other review of systems is negative unless indicated above    Vital Signs Last 24 Hrs  T(C): 36.8 (28 Mar 2022 07:23), Max: 37.2 (27 Mar 2022 18:20)  T(F): 98.3 (28 Mar 2022 07:23), Max: 99 (27 Mar 2022 18:20)  HR: 54 (28 Mar 2022 07:23) (54 - 64)  BP: 117/53 (28 Mar 2022 07:23) (102/51 - 145/66)  BP(mean): 68 (28 Mar 2022 07:23) (68 - 83)  RR: 18 (28 Mar 2022 07:23) (16 - 18)  SpO2: 94% (28 Mar 2022 07:23) (94% - 99%)    I&O's Summary    27 Mar 2022 07:01  -  28 Mar 2022 07:00  --------------------------------------------------------  IN: 600 mL / OUT: 225 mL / NET: 375 mL        I&O's Detail    27 Mar 2022 07:01  -  28 Mar 2022 07:00  --------------------------------------------------------  IN:    sodium chloride 0.9%: 600 mL  Total IN: 600 mL    OUT:    Voided (mL): 225 mL  Total OUT: 225 mL    Total NET: 375 mL          PHYSICAL EXAM:    Constitutional: NAD, awake and alert, well-developed  HEENT: PERR, EOMI,  No oral cyananosis.  Neck:  supple,  No JVD  Respiratory: Breath sounds are clear bilaterally, No wheezing, rales or rhonchi  Cardiovascular: S1 and S2, regular rate and rhythm, no Murmurs, gallops or rubs  Gastrointestinal: Bowel Sounds present, soft, nontender.   Extremities: No peripheral edema. No clubbing or cyanosis.  Vascular: 2+ peripheral pulses  Neurological: A/O x 3, no focal deficits  Musculoskeletal: no calf tenderness.  Skin: No rashes.    ===============================  ===============================  LABS:                         12.4   14.38 )-----------( 121      ( 28 Mar 2022 07:52 )             36.9                         13.8   24.19 )-----------( 163      ( 27 Mar 2022 15:23 )             40.9     28 Mar 2022 07:52    141    |  112    |  38     ----------------------------<  80     4.1     |  25     |  1.20   27 Mar 2022 15:23    140    |  110    |  38     ----------------------------<  89     5.3     |  27     |  1.54     Ca    8.1        28 Mar 2022 07:52  Ca    8.8        27 Mar 2022 15:23  Phos  2.8       28 Mar 2022 07:52  Mg     2.4       28 Mar 2022 07:52    TPro  7.0    /  Alb  3.3    /  TBili  1.0    /  DBili  x      /  AST  85     /  ALT  29     /  AlkPhos  67     27 Mar 2022 15:23    PT/INR - ( 27 Mar 2022 15:23 )   PT: 14.5 sec;   INR: 1.25 ratio         PTT - ( 27 Mar 2022 15:23 )  PTT:29.5 sec    THYROID STUDIES:    ===============================  ===============================  CARDIAC BIOMARKERS:  -BNP VALUES:      -TROPONIN VALUES:  -------  lab item   Troponin I, High Sensitivity Result: 198.95 ng/L *H* (03-28-22 @ 07:52)  Troponin I, High Sensitivity Result: 182.32 ng/L *H* (03-27-22 @ 18:48)  Troponin I, High Sensitivity Result: 154.16 ng/L *H* (03-27-22 @ 15:23)    ===============================  ===============================  EKG: NSR, no ischemic changes.    TELE: NSR  ===============================    Kenneth Bell M.D.  Cardiology, [facility]  Cell: [phone]  Office:   [phone] ([facility] Office)  [phone] ([facility] Office)      =================

## 2022-03-28 NOTE — PHYSICAL THERAPY INITIAL EVALUATION ADULT - ADDITIONAL COMMENTS
Pt was cooking, cleaning, driving and ambulating all Independently with no AD PTA. However patient admits to recent falls due to impaired balance.

## 2022-03-28 NOTE — PHYSICAL THERAPY INITIAL EVALUATION ADULT - MODALITIES TREATMENT COMMENTS
Pt left OOB in chair in NAD with HM Intact. CBIR. Pt instructed to not get up alone. Chair alarm donned. IGOR Faustin made aware

## 2022-03-28 NOTE — PROGRESS NOTE ADULT - ASSESSMENT
89/M with PMHx of hld, htn, pvd, atherosclerosis, CAD s/p CABG, was brought to ED from home s/p fall. Patient admitted to hospital medicine service for management of:    1) Fall + ? syncope + abn trops:  admit to tele  fall precautions  echo  serial trops  cardio consult  asa, BB, statin    2) ? JOSETTE:  iv fluids  BMP in am    3) UTI: iv Ceftriaxone  urine cx    4) Elevated CPK: iv fluids  recheck in am    5) Leukocytosis: from fall and uti  recheck    6) Elevated lactate: no sepsis  iv fluids  repeat    7) CAD s/p CABG:  cont asa, BB, statin    8) DVT PPX: heparin s/q    poc discussed with pt, his daughter, Mathew, team  89/M with PMHx of hld, htn, pvd, atherosclerosis, CAD s/p CABG, was brought to ED from home s/p fall. Patient admitted to hospital medicine service for management of:    Fall. Possible syncope in the setting of Probable UTI vs cardiac related.  Monitor on tele. Run of NSVT, ?aberrancy. Trops 154.16, 182.32  CT imaging w/o acute fracture  Orthostatics negative  UA (+), continue with Ceftriaxone. F/u UCx  TSH, Echo pending    Elevated Troponins  Monitor on tele. Run of NSVT, ?aberrancy    Denies current CP  Trops 154.16, 182.32  F/u ECG and echocardiogram  Continue with aspirin, simvastatin, Metoprolol ( Patient baseline SR-SB high 50s)  Cardio eval -- ?ischemic workup     Rhabdomyolysis   CPK up trending. 1L Bolus, C/w aggressive IV hydration    JOSETTE  Improved. C/w IVF  Continue ABX for UTI    Probable UTI  C/w Ceftriaxone. F/u UCx    CAD s/p CABG  Continue with aspirin, simvastatin, Metoprolol     DVT ppx  SCDs      Dispo: Monitor on tele. C/w medical plan as outlined above. PT eval.

## 2022-03-28 NOTE — PHYSICAL THERAPY INITIAL EVALUATION ADULT - DISCHARGE DISPOSITION, PT EVAL
Home with Assistance. Outpatient vs Home Physical Therapy Services for Balance/Gait Stability & Fall Prevention

## 2022-03-28 NOTE — PROGRESS NOTE ADULT - SUBJECTIVE AND OBJECTIVE BOX
Chief Complaint: fall, found on floor    Hpi: 89/M with PMHx of hld, htn, pvd, atherosclerosis, CAD s/p CABG, was brought to ED from home s/p fall. Daughter states patient fell last night and was not able to get up. Patient was found on the floor when daughter visited early afternoon today. He has multiple abrasion on right knee and right wrist. Daughter states patient lives alone and was supposed to go see his girlfriend today but didn't go. Girlfriend called patient but no answer and then daughter called patient and no answer. When daughter visited today, patient was found on the wooden floor of his bedroom. He does not know if he passed out. Denies any chest pain.     3/28/22:  All other review of systems is negative unless indicated above    Physical Exam:  T(C): 36.8 (28 Mar 2022 07:23), Max: 37.2 (27 Mar 2022 18:20)  T(F): 98.3 (28 Mar 2022 07:23), Max: 99 (27 Mar 2022 18:20)  HR: 54 (28 Mar 2022 07:23) (54 - 72)  BP: 117/53 (28 Mar 2022 07:23) (102/51 - 145/66)  BP(mean): 68 (28 Mar 2022 07:23) (68 - 83)  RR: 18 (28 Mar 2022 07:23) (16 - 18)  SpO2: 94% (28 Mar 2022 07:23) (93% - 99%)    Constitutional: NAD, awake and alert  HEENT: PERR, EOMI, Normal Hearing, MMM  Neck: Soft and supple, No LAD, No JVD  Respiratory: Breath sounds are clear bilaterally, No wheezing, rales or rhonchi  Cardiovascular: S1 and S2, regular rate and rhythm, no Murmurs, gallops or rubs  Gastrointestinal: Bowel Sounds present, soft, nontender, nondistended, no guarding, no rebound  Extremities: No peripheral edema  Vascular: 2+ peripheral pulses  Neurological: A/O x 3, no focal deficits  Musculoskeletal: 5/5 strength b/l upper and lower extremities  Skin: No rashes    Labs:                        12.4   14.38 )-----------( 121      ( 28 Mar 2022 07:52 )             36.9     03-27    140  |  110<H>  |  38<H>  ----------------------------<  89  5.3   |  27  |  1.54<H>    Ca    8.8      27 Mar 2022 15:23    TPro  7.0  /  Alb  3.3  /  TBili  1.0  /  DBili  x   /  AST  85<H>  /  ALT  29  /  AlkPhos  67  03-27    PT/INR - ( 27 Mar 2022 15:23 )   PT: 14.5 sec;   INR: 1.25 ratio      PTT - ( 27 Mar 2022 15:23 )  PTT:29.5 sec    Lactate 2.4 -- 1.7    Micro:    UA (+)    SARS-CoV-2: NotDetec (27 Mar 2022 15:23)    Radiology:    3/27/22: CT Head No Cont: CT HEAD: Mild periventricular white matter ischemia with 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 Moderate atrophy. CT cervical spine:   No vertebral fracture is recognized.  Moderate  degenerative disc disease and spondylosis at C4-5 and severe at C5-6 and C6-7 with loss of disc height and associated degenerative endplate changes. There is narrowing of the RIGHT C2-3, BILATERAL C3-4, C4-5 and C5-6 and C6-7 neural foramina due to uncovertebral spurring and facet osteophytic hypertrophy.    3/27/22: CT Chest, Abdomen and Pelvis w/ IV Cont: No sequela of trauma. Prominent left-sided bladder diverticulum. Enlarged prostate.    Cardiac Testing:    Trops 154.16, 182.32    BNP 4120    Medications:  aspirin  chewable 81 milliGRAM(s) Oral daily  cefTRIAXone   IVPB 1000 milliGRAM(s) IV Intermittent every 24 hours  heparin   Injectable 5000 Unit(s) SubCutaneous every 12 hours  ketorolac 0.5% Ophthalmic Solution 1 Drop(s) Both EYES four times a day  metoprolol succinate ER 12.5 milliGRAM(s) Oral daily  simvastatin 40 milliGRAM(s) Oral at bedtime  sodium chloride 0.9%. 1000 milliLiter(s) (50 mL/Hr) IV Continuous <Continuous>  tamsulosin 0.4 milliGRAM(s) Oral at bedtime    Home Medications:  aspirin 81 mg oral tablet: 1 tab(s) orally once a day (27 Mar 2022 18:56)  ketorolac 0.5% ophthalmic solution: 1 drop(s) to each affected eye 4 times a day (27 Mar 2022 18:56)  metoprolol succinate 25 mg oral tablet, extended release: 0.5 tab(s) orally once a day (27 Mar 2022 18:56)  simvastatin 40 mg oral tablet: 1 tab(s) orally once a day (at bedtime) (27 Mar 2022 18:56)  tamsulosin 0.4 mg oral capsule: 1 cap(s) orally once a day (27 Mar 2022 18:56)   Chief Complaint: fall, found on floor    Hpi: 89/M with PMHx of hld, htn, pvd, atherosclerosis, CAD s/p CABG, was brought to ED from home s/p fall. Daughter states patient fell last night and was not able to get up. Patient was found on the floor when daughter visited early afternoon today. He has multiple abrasion on right knee and right wrist. Daughter states patient lives alone and was supposed to go see his girlfriend today but didn't go. Girlfriend called patient but no answer and then daughter called patient and no answer. When daughter visited today, patient was found on the wooden floor of his bedroom. He does not know if he passed out. Denies any chest pain.     3/28/22: feeling well, reports generalized weakness. no cp or sob.   All other review of systems is negative unless indicated above    Physical Exam:  T(C): 36.8 (28 Mar 2022 07:23), Max: 37.2 (27 Mar 2022 18:20)  T(F): 98.3 (28 Mar 2022 07:23), Max: 99 (27 Mar 2022 18:20)  HR: 54 (28 Mar 2022 07:23) (54 - 72)  BP: 117/53 (28 Mar 2022 07:23) (102/51 - 145/66)  BP(mean): 68 (28 Mar 2022 07:23) (68 - 83)  RR: 18 (28 Mar 2022 07:23) (16 - 18)  SpO2: 94% (28 Mar 2022 07:23) (93% - 99%) room air    Constitutional: NAD, awake and alert  HEENT: PERR, EOMI, Normal Hearing, MMM  Neck: Soft and supple, No LAD, No JVD  Respiratory: Breath sounds are clear bilaterally, No wheezing, rales or rhonchi  Cardiovascular: S1 and S2, regular rate and rhythm, no Murmurs, gallops or rubs  Gastrointestinal: Bowel Sounds present, soft, nontender, nondistended, no guarding, no rebound  Extremities: No peripheral edema  Vascular: 2+ peripheral pulses  Neurological: A/O x 3, no focal deficits  Musculoskeletal: 5/5 strength b/l upper and lower extremities  Skin: No rashes    Labs:                        12.4   14.38 )-----------( 121      ( 28 Mar 2022 07:52 )             36.9     03-27    140  |  110<H>  |  38<H>  ----------------------------<  89  5.3   |  27  |  1.54<H>    Ca    8.8      27 Mar 2022 15:23    TPro  7.0  /  Alb  3.3  /  TBili  1.0  /  DBili  x   /  AST  85<H>  /  ALT  29  /  AlkPhos  67  03-27    PT/INR - ( 27 Mar 2022 15:23 )   PT: 14.5 sec;   INR: 1.25 ratio      PTT - ( 27 Mar 2022 15:23 )  PTT:29.5 sec    Lactate 2.4 -- 1.7    Micro:    UA (+)    SARS-CoV-2: NotDetec (27 Mar 2022 15:23)    Radiology:    3/27/22: CT Head No Cont: CT HEAD: Mild periventricular white matter ischemia with 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 Moderate atrophy. CT cervical spine:   No vertebral fracture is recognized.  Moderate  degenerative disc disease and spondylosis at C4-5 and severe at C5-6 and C6-7 with loss of disc height and associated degenerative endplate changes. There is narrowing of the RIGHT C2-3, BILATERAL C3-4, C4-5 and C5-6 and C6-7 neural foramina due to uncovertebral spurring and facet osteophytic hypertrophy.    3/27/22: CT Chest, Abdomen and Pelvis w/ IV Cont: No sequela of trauma. Prominent left-sided bladder diverticulum. Enlarged prostate.    Cardiac Testing:    Trops 154.16, 182.32    BNP 4120    ECHO pending     Medications:  aspirin  chewable 81 milliGRAM(s) Oral daily  cefTRIAXone   IVPB 1000 milliGRAM(s) IV Intermittent every 24 hours  heparin   Injectable 5000 Unit(s) SubCutaneous every 12 hours  ketorolac 0.5% Ophthalmic Solution 1 Drop(s) Both EYES four times a day  metoprolol succinate ER 12.5 milliGRAM(s) Oral daily  simvastatin 40 milliGRAM(s) Oral at bedtime  sodium chloride 0.9%. 1000 milliLiter(s) (50 mL/Hr) IV Continuous <Continuous>  tamsulosin 0.4 milliGRAM(s) Oral at bedtime    Home Medications:  aspirin 81 mg oral tablet: 1 tab(s) orally once a day (27 Mar 2022 18:56)  ketorolac 0.5% ophthalmic solution: 1 drop(s) to each affected eye 4 times a day (27 Mar 2022 18:56)  metoprolol succinate 25 mg oral tablet, extended release: 0.5 tab(s) orally once a day (27 Mar 2022 18:56)  simvastatin 40 mg oral tablet: 1 tab(s) orally once a day (at bedtime) (27 Mar 2022 18:56)  tamsulosin 0.4 mg oral capsule: 1 cap(s) orally once a day (27 Mar 2022 18:56)

## 2022-03-29 ENCOUNTER — TRANSCRIPTION ENCOUNTER (OUTPATIENT)
Age: 87
End: 2022-03-29

## 2022-03-29 LAB
ANION GAP SERPL CALC-SCNC: 6 MMOL/L — SIGNIFICANT CHANGE UP (ref 5–17)
BUN SERPL-MCNC: 34 MG/DL — HIGH (ref 7–23)
CALCIUM SERPL-MCNC: 7.7 MG/DL — LOW (ref 8.5–10.1)
CHLORIDE SERPL-SCNC: 115 MMOL/L — HIGH (ref 96–108)
CK SERPL-CCNC: 6630 U/L — HIGH (ref 26–308)
CO2 SERPL-SCNC: 20 MMOL/L — LOW (ref 22–31)
CREAT SERPL-MCNC: 0.82 MG/DL — SIGNIFICANT CHANGE UP (ref 0.5–1.3)
CULTURE RESULTS: NO GROWTH — SIGNIFICANT CHANGE UP
EGFR: 80 ML/MIN/1.73M2 — SIGNIFICANT CHANGE UP
GLUCOSE SERPL-MCNC: 90 MG/DL — SIGNIFICANT CHANGE UP (ref 70–99)
HCT VFR BLD CALC: 36.2 % — LOW (ref 39–50)
HGB BLD-MCNC: 12 G/DL — LOW (ref 13–17)
MAGNESIUM SERPL-MCNC: 2.1 MG/DL — SIGNIFICANT CHANGE UP (ref 1.6–2.6)
MCHC RBC-ENTMCNC: 32 PG — SIGNIFICANT CHANGE UP (ref 27–34)
MCHC RBC-ENTMCNC: 33.1 GM/DL — SIGNIFICANT CHANGE UP (ref 32–36)
MCV RBC AUTO: 96.5 FL — SIGNIFICANT CHANGE UP (ref 80–100)
PLATELET # BLD AUTO: 110 K/UL — LOW (ref 150–400)
POTASSIUM SERPL-MCNC: 3.8 MMOL/L — SIGNIFICANT CHANGE UP (ref 3.5–5.3)
POTASSIUM SERPL-SCNC: 3.8 MMOL/L — SIGNIFICANT CHANGE UP (ref 3.5–5.3)
RBC # BLD: 3.75 M/UL — LOW (ref 4.2–5.8)
RBC # FLD: 14.2 % — SIGNIFICANT CHANGE UP (ref 10.3–14.5)
SODIUM SERPL-SCNC: 141 MMOL/L — SIGNIFICANT CHANGE UP (ref 135–145)
SPECIMEN SOURCE: SIGNIFICANT CHANGE UP
T3 SERPL-MCNC: 51 NG/DL — LOW (ref 80–200)
WBC # BLD: 11.33 K/UL — HIGH (ref 3.8–10.5)
WBC # FLD AUTO: 11.33 K/UL — HIGH (ref 3.8–10.5)

## 2022-03-29 PROCEDURE — 99233 SBSQ HOSP IP/OBS HIGH 50: CPT

## 2022-03-29 PROCEDURE — 99232 SBSQ HOSP IP/OBS MODERATE 35: CPT

## 2022-03-29 RX ORDER — SODIUM CHLORIDE 9 MG/ML
1000 INJECTION INTRAMUSCULAR; INTRAVENOUS; SUBCUTANEOUS
Refills: 0 | Status: DISCONTINUED | OUTPATIENT
Start: 2022-03-29 | End: 2022-03-30

## 2022-03-29 RX ADMIN — Medication 1 DROP(S): at 05:29

## 2022-03-29 RX ADMIN — CEFTRIAXONE 100 MILLIGRAM(S): 500 INJECTION, POWDER, FOR SOLUTION INTRAMUSCULAR; INTRAVENOUS at 17:23

## 2022-03-29 RX ADMIN — SIMVASTATIN 40 MILLIGRAM(S): 20 TABLET, FILM COATED ORAL at 20:56

## 2022-03-29 RX ADMIN — Medication 12.5 MILLIGRAM(S): at 05:29

## 2022-03-29 RX ADMIN — Medication 81 MILLIGRAM(S): at 09:16

## 2022-03-29 RX ADMIN — TAMSULOSIN HYDROCHLORIDE 0.4 MILLIGRAM(S): 0.4 CAPSULE ORAL at 20:56

## 2022-03-29 RX ADMIN — Medication 1 DROP(S): at 11:46

## 2022-03-29 RX ADMIN — Medication 1 DROP(S): at 23:43

## 2022-03-29 RX ADMIN — Medication 1 DROP(S): at 17:23

## 2022-03-29 NOTE — SBIRT NOTE ADULT - NSSBIRTDRGPOSREINDET_GEN_A_CORE
Reinforced importance of using drugs for prescribed medical reasons only. Pt verbalizes understanding.

## 2022-03-29 NOTE — DISCHARGE NOTE NURSING/CASE MANAGEMENT/SOCIAL WORK - NSSCCARECORD_GEN_ALL_CORE
[facility]/D[facility] Medical Equipment Agency/Community [facility]

## 2022-03-29 NOTE — DISCHARGE NOTE NURSING/CASE MANAGEMENT/SOCIAL WORK - NSDCPEFALRISK_GEN_ALL_CORE
For information on Fall & Injury Prevention, visit: [email or web address] OR  [email or web address] OR  https://www.cdc.gov/steadi/patient.html

## 2022-03-29 NOTE — PROGRESS NOTE ADULT - PROBLEM SELECTOR PLAN 2
Problem: Elevated troponin.   ·  Recommendation: Minimally elevated w/ minimal change.   Echo shows normal LVF,  w/ no RWMA, mild to moderate valve disease  no further workup in this 89 yr pt, likely demand ischemia.  Patient to follow up with out pt cardiologist

## 2022-03-29 NOTE — DISCHARGE NOTE NURSING/CASE MANAGEMENT/SOCIAL WORK - NSDCVIVACCINE_GEN_ALL_CORE_FT
Tdap; 27-Mar-2022 16:28; Paz Peters ([employer]); [employer]; [id number] (Exp. Date: 18-Sep-2023); IntraMuscular; Deltoid Left.; 0.5 milliLiter(s); VIS (VIS Published: 09-May-2013, VIS Presented: 27-Mar-2022);

## 2022-03-29 NOTE — DIETITIAN INITIAL EVALUATION ADULT. - PERTINENT LABORATORY DATA
03-29    141  |  115<H>  |  34<H>  ----------------------------<  90  3.8   |  20<L>  |  0.82    Ca    7.7<L>      29 Mar 2022 07:25  Phos  2.8     03-28  Mg     2.1     03-29    TPro  7.0  /  Alb  3.3  /  TBili  1.0  /  DBili  x   /  AST  85<H>  /  ALT  29  /  AlkPhos  67  03-27

## 2022-03-29 NOTE — DISCHARGE NOTE NURSING/CASE MANAGEMENT/SOCIAL WORK - PATIENT PORTAL LINK FT
You can access the FollowMyHealth Patient Portal offered by [facility] by registering at the following website: [email or web address]. By joining [email or web address]’s FollowMyHealth portal, you will also be able to view your health information using other applications (apps) compatible with our system.

## 2022-03-29 NOTE — PROGRESS NOTE ADULT - NUTRITIONAL ASSESSMENT
This patient has been assessed with a concern for Malnutrition and has been determined to have a diagnosis/diagnoses of Moderate protein-calorie malnutrition.    This patient is being managed with:   Diet DASH/TLC-  Sodium & Cholesterol Restricted  Entered: Mar 27 2022  6:49PM

## 2022-03-29 NOTE — DIETITIAN INITIAL EVALUATION ADULT. - ORAL INTAKE PTA/DIET HISTORY
Pt reports about 1 month of poor PO intake. Pt states over this time he lost about 10lbs. Pt is concerned about weight loss and has stated that he is a single eve and sometimes dose not want to cook. Pt started skipping meals and has less interest in eating. Pt denies c/s difficulty and denies n/v/d/c. Pt agreeable to starting protein supplement in the hospital and would like to start at home. Given age and weight loss pt meets criteria for protein calorie malnutrition.

## 2022-03-29 NOTE — DIETITIAN INITIAL EVALUATION ADULT. - OTHER INFO
89/M with PMHx of hld, htn, pvd, atherosclerosis, CAD s/p CABG, was brought to ED from home s/p fall. Daughter states patient fell last night and was not able to get up. Patient was found on the floor when daughter visited early afternoon today. He has multiple abrasion on right knee and right wrist. Daughter states patient lives alone and was supposed to go see his girlfriend today but didn't go. Girlfriend called patient but no answer and then daughter called patient and no answer. When daughter visited today, patient was found on the wooden floor of his bedroom. He does not know if he passed out. Denies any chest pain.

## 2022-03-29 NOTE — PROGRESS NOTE ADULT - NS ATTEND AMEND GEN_ALL_CORE FT
agree w/ above    I, Dr. Bell, personally performed the   evaluation and management (E/M) services for this  established patient who presents today w/   a new problem/exacerbvation of an existing condition.  That E/M includes conducting the   examination assessing all new/exacerbated conditions,  and establishing a new plan of care.  Today my  ACP __ assisted in my evaluation and management services  for this new problem/exacerbated condition to be followed going forward.

## 2022-03-29 NOTE — DIETITIAN INITIAL EVALUATION ADULT. - PERTINENT MEDS FT
MEDICATIONS  (STANDING):  aspirin  chewable 81 milliGRAM(s) Oral daily  cefTRIAXone   IVPB 1000 milliGRAM(s) IV Intermittent every 24 hours  ketorolac 0.5% Ophthalmic Solution 1 Drop(s) Both EYES four times a day  metoprolol succinate ER 12.5 milliGRAM(s) Oral daily  simvastatin 40 milliGRAM(s) Oral at bedtime  sodium chloride 0.9%. 1000 milliLiter(s) (75 mL/Hr) IV Continuous <Continuous>  tamsulosin 0.4 milliGRAM(s) Oral at bedtime    MEDICATIONS  (PRN):

## 2022-03-29 NOTE — PROGRESS NOTE ADULT - PROBLEM SELECTOR PLAN 1
Problem: Syncope.   ·  Recommendation: Pt appears to be a reliable historian & denies syncope.  Tele shows normal sinus rhythm, echo shows normal LVF

## 2022-03-29 NOTE — PROGRESS NOTE ADULT - ASSESSMENT
89yo male with PMHx HLD, HTN, PVD, atherosclerosis, CAD s/p CABG presents s/p fall at home. Pt admitted to hospital medicine service for management of:     Fall. Possible syncope in the setting of Probable UTI vs cardiac related.  Monitor on tele. Run of NSVT, ?aberrancy.   CT imaging w/o acute fracture  Orthostatics negative  UA (+), UCx- no growth, but collected after abx initiated. Will continue with Ceftriaxone  TSH 0.22  Echo: EF 60-65%    Elevated Troponins  Monitor on tele.   Denies current CP  Trops 198.95, <- 182.32, <- 154.16  ECG: Sinus bradycardia  Echo: EF 60-65%  Continue with aspirin, simvastatin, Metoprolol ( Patient baseline SR-SB high 50s)  Cardiology consult appreciated     Rhabdomyolysis   CK now downtrending 6630 from 15769, s/p 1L bolus, C/w aggressive IV hydration  Continue to monitor CK    JOSETTE  Improved. C/w IVF  Continue ABX for UTI    Probable UTI  UA +, UCx- no growth, but collected after abx initiated. Will continue with Ceftriaxone    CAD s/p CABG  Continue with aspirin, simvastatin, Metoprolol   DASH/TLC diet    DVT ppx  SCDs      Dispo: Monitor on tele. C/w medical plan as outlined above.      89yo male with PMHx HLD, HTN, PVD, atherosclerosis, CAD s/p CABG presents s/p fall at home. Pt admitted to hospital medicine service for management of:     Fall. Possible syncope in the setting of Probable UTI  Monitor on tele. Run of NSVT  ?aberrancy on 3/28, no further events  CT imaging w/o acute fracture  Orthostatics negative  UA (+), UCx- no growth, but collected after abx initiated. Will continue with Ceftriaxone for at least 5d course  Echo: EF 60-65%    Elevated Troponins  Monitor on tele. Denies current CP  Trops 198.95, <- 182.32, <- 154.16  ECG: Sinus bradycardia  Echo: EF 60-65%, no significant valvulopathy   Continue with aspirin, simvastatin, Metoprolol (patient baseline SR-SB high 50s)    Rhabdomyolysis   CK now downtrending 6630 from 05815, s/p 1L bolus, C/w IV hydration  Repeat CK in AM    JOSETTE  Improved. C/w IVF  Continue ABX for UTI    Probable UTI  UA +, UCx- no growth, but collected after abx initiated. Will continue with Ceftriaxone    CAD s/p CABG  Continue with aspirin, simvastatin, Metoprolol   DASH/TLC diet    DVT ppx  SCDs      Dispo: Monitor on tele. C/w medical plan as outlined above.     Spoke with daughter, updated. DC planning likely for tm.

## 2022-03-29 NOTE — PROGRESS NOTE ADULT - SUBJECTIVE AND OBJECTIVE BOX
89/M with PMHx of     hld, htn, pvd, atherosclerosis, CAD s/p CABG,     was brought to ED from home s/p fall.     Daughter states patient fell last night and was   not able to get up. Patient was found on the floor   when daughter visited early afternoon today.   He has multiple abrasion on right knee and right wrist.   Daughter states patient lives alone and was supposed   to go see his girlfriend today but didn't go.   Girlfriend called patient but no answer and then daughter   called patient and no answer. When daughter   visited today, patient was found on the wooden floor of his bedroom.     He does not know if he passed out. Denies any chest pain.     Trauma work up neg    Cardiology consulted for  "? syncope, abn trop"  Pt denies any loss of consciousness.  He clearly states that he slid off his bed fell to the floor   & could not get up.  Appears to be a reliable historian.  He has a cardiologist in [place] but does   not remember his name.    3/29/22: Patient without complaints of chest pain or shortness of breath. Complains of having hiccups for the past 2 days    Allergies    No Known Allergies    Intolerances    MEDICATIONS:  OUTPATIENT:  Home Medications:  aspirin 81 mg oral tablet: 1 tab(s) orally once a day (27 Mar 2022 18:56)  ketorolac 0.5% ophthalmic solution: 1 drop(s) to each affected eye 4 times a day (27 Mar 2022 18:56)  metoprolol succinate 25 mg oral tablet, extended release: 0.5 tab(s) orally once a day (27 Mar 2022 18:56)  simvastatin 40 mg oral tablet: 1 tab(s) orally once a day (at bedtime) (27 Mar 2022 18:56)  tamsulosin 0.4 mg oral capsule: 1 cap(s) orally once a day (27 Mar 2022 18:56)    MEDICATIONS  (STANDING):  aspirin  chewable 81 milliGRAM(s) Oral daily  cefTRIAXone   IVPB 1000 milliGRAM(s) IV Intermittent every 24 hours  ketorolac 0.5% Ophthalmic Solution 1 Drop(s) Both EYES four times a day  metoprolol succinate ER 12.5 milliGRAM(s) Oral daily  simvastatin 40 milliGRAM(s) Oral at bedtime  sodium chloride 0.9%. 1000 milliLiter(s) (75 mL/Hr) IV Continuous <Continuous>  tamsulosin 0.4 milliGRAM(s) Oral at bedtime    MEDICATIONS  (PRN):    Vital Signs Last 24 Hrs  T(C): 36.8 (29 Mar 2022 08:31), Max: 36.8 (29 Mar 2022 08:31)  T(F): 98.3 (29 Mar 2022 08:31), Max: 98.3 (29 Mar 2022 08:31)  HR: 63 (29 Mar 2022 08:31) (58 - 70)  BP: 151/77 (29 Mar 2022 08:31) (110/51 - 151/77)  BP(mean): --  RR: 18 (29 Mar 2022 08:31) (18 - 18)  SpO2: 95% (29 Mar 2022 08:31) (95% - 96%)    I&O's Summary    27 Mar 2022 07:01  -  28 Mar 2022 07:00  --------------------------------------------------------  IN: 600 mL / OUT: 225 mL / NET: 375 mL        I&O's Detail    27 Mar 2022 07:01  -  28 Mar 2022 07:00  --------------------------------------------------------  IN:    sodium chloride 0.9%: 600 mL  Total IN: 600 mL    OUT:    Voided (mL): 225 mL  Total OUT: 225 mL    Total NET: 375 mL    PHYSICAL EXAM:    Constitutional: NAD, awake and alert, well-developed  HEENT: NC/AT, EOMI,  No oral cyananosis.  Neck:  supple,  No JVD  Respiratory: Breath sounds are clear bilaterally, No wheezing, rales or rhonchi  Cardiovascular: S1 and S2, regular rate and rhythm, no Murmurs, gallops or rubs  Gastrointestinal: Bowel Sounds present, soft, nontender.   Extremities: No peripheral edema. No clubbing or cyanosis.  Vascular: 2+ peripheral pulses  Neurological: A/O x 3, no focal deficits  Musculoskeletal: no calf tenderness.  Skin: No rashes.    tele: STR  ===============================  ===============================  LABS:                           12.0   11.33 )-----------( 110      ( 29 Mar 2022 07:25 )             36.2                           12.4   14.38 )-----------( 121      ( 28 Mar 2022 07:52 )             36.9                         13.8   24.19 )-----------( 163      ( 27 Mar 2022 15:23 )             40.9     03-29    141  |  115<H>  |  34<H>  ----------------------------<  90  3.8   |  20<L>  |  0.82    Ca    7.7<L>      29 Mar 2022 07:25  Phos  2.8     03-28  Mg     2.1     03-29    TPro  7.0  /  Alb  3.3  /  TBili  1.0  /  DBili  x   /  AST  85<H>  /  ALT  29  /  AlkPhos  67  03-27      28 Mar 2022 07:52    141    |  112    |  38     ----------------------------<  80     4.1     |  25     |  1.20   27 Mar 2022 15:23    140    |  110    |  38     ----------------------------<  89     5.3     |  27     |  1.54     Ca    8.1        28 Mar 2022 07:52  Ca    8.8        27 Mar 2022 15:23  Phos  2.8       28 Mar 2022 07:52  Mg     2.4       28 Mar 2022 07:52    TPro  7.0    /  Alb  3.3    /  TBili  1.0    /  DBili  x      /  AST  85     /  ALT  29     /  AlkPhos  67     27 Mar 2022 15:23    PT/INR - ( 27 Mar 2022 15:23 )   PT: 14.5 sec;   INR: 1.25 ratio         PTT - ( 27 Mar 2022 15:23 )  PTT:29.5 sec    THYROID STUDIES:    ===============================  ===============================  CARDIAC BIOMARKERS:  -BNP VALUES:    -TROPONIN VALUES:  -------  lab item   Troponin I, High Sensitivity Result: 198.95 ng/L *H* (03-28-22 @ 07:52)  Troponin I, High Sensitivity Result: 182.32 ng/L *H* (03-27-22 @ 18:48)  Troponin I, High Sensitivity Result: 154.16 ng/L *H* (03-27-22 @ 15:23)    ===============================  ===============================  EKG: NSR, no ischemic changes.    TELE: NSR  ===============================    Kenneth Bell M.D.  Cardiology, [facility]  Cell: [phone]  Office:   [phone] ([facility] Office)  [phone] ([facility] Office)      =================      < from: TTE Echo Complete w/o Contrast w/ Doppler (03.28.22 @ 12:28) >  Summary     Estimated left ventricular ejectionfraction is 60-65%.   The left ventricle is normal in size, wall motion and contractility as   seen in limited views.   Mild concentric left ventricular hypertrophy is present.   The left atrium is mildly dilated.   The aortic valve is well visualized, appears mildly calcified. Valve   opening seems to be normal.   Mild (1+) aortic regurgitation is present.   The mitral valve leaflets appear thickened.   Mild (1+) mitral regurgitation is present.   EA reversal of the mitral inflow consistent withreduced compliance of   the   left ventricle.   Normal appearing tricuspid valve structure.   Moderate (2+) tricuspid valve regurgitation is present.   Mild pulmonary hypertension.   Pulmonic valve not well seen.   Trace pulmonic valvular regurgitation is present.   The IVC appears normal.     Ascending aorta is within the upper limits of normal (4.0 cm).     Signature    < end of copied text >

## 2022-03-29 NOTE — PROGRESS NOTE ADULT - SUBJECTIVE AND OBJECTIVE BOX
Chief Complaint: syncopal fall      HPI:   89/M with PMHx of hld, htn, pvd, atherosclerosis, CAD s/p CABG, was brought to ED from home s/p fall. Daughter states patient fell last night and was not able to get up. Patient was found on the floor when daughter visited early afternoon today. He has multiple abrasion on right knee and right wrist. Daughter states patient lives alone and was supposed to go see his girlfriend today but didn't go. Girlfriend called patient but no answer and then daughter called patient and no answer. When daughter visited today, patient was found on the wooden floor of his bedroom. He does not know if he passed out. Denies any chest pain.     Trauma work up neg   (27 Mar 2022 18:49)    3/29/22: Pt seen and examined at bedside. Pt with no complaints. Pt in no distress.     All other review of systems is negative unless indicated above    Physical Exam:  Vital Signs Last 24 Hrs  T(C): 36.8 (29 Mar 2022 08:31), Max: 36.8 (29 Mar 2022 08:31)  T(F): 98.3 (29 Mar 2022 08:31), Max: 98.3 (29 Mar 2022 08:31)  HR: 63 (29 Mar 2022 08:31) (58 - 70)  BP: 151/77 (29 Mar 2022 08:31) (110/51 - 151/77)  BP(mean): --  RR: 18 (29 Mar 2022 08:31) (18 - 18)  SpO2: 95% (29 Mar 2022 08:31) (95% - 96%)    Constitutional: NAD, awake and alert  HEENT: PERR, EOMI, Normal Hearing, MMM  Neck: Soft and supple, No LAD, No JVD  Respiratory: Breath sounds are clear bilaterally, No wheezing, rales or rhonchi  Cardiovascular: S1 and S2, regular rate and rhythm, no Murmurs, gallops or rubs  Gastrointestinal: Bowel Sounds present, soft, nontender, nondistended, no guarding, no rebound  Extremities: No peripheral edema  Vascular: 2+ peripheral pulses  Neurological: A/O x 3, no focal deficits  Musculoskeletal: 5/5 strength b/l upper and lower extremities  Skin: No rashes    Labs:                        12.0   11.33 )-----------( 110      ( 29 Mar 2022 07:25 )             36.2     03-29    141  |  115<H>  |  34<H>  ----------------------------<  90  3.8   |  20<L>  |  0.82    Ca    7.7<L>      29 Mar 2022 07:25  Phos  2.8     03-28  Mg     2.1     03-29    TPro  7.0  /  Alb  3.3  /  TBili  1.0  /  DBili  x   /  AST  85<H>  /  ALT  29  /  AlkPhos  67  03-27    PT/INR - ( 27 Mar 2022 15:23 )   PT: 14.5 sec;   INR: 1.25 ratio         PTT - ( 27 Mar 2022 15:23 )  PTT:29.5 sec  CARDIAC MARKERS ( 29 Mar 2022 07:25 )  x     / x     / 6630 U/L / x     / x      CARDIAC MARKERS ( 28 Mar 2022 07:52 )  x     / x     / 93088 U/L / x     / x      CARDIAC MARKERS ( 27 Mar 2022 15:23 )  x     / x     / 4120 U/L / x     / x          Micro:  UA (+)    Urine Culture (-)    SARS-CoV-2: NotDetec (27 Mar 2022 15:23)    3/27/22 Blood Cultures (-)    Radiology:    3/27/22: CT Head No Cont: CT HEAD: Mild periventricular white matter ischemia with 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 Moderate atrophy. CT cervical spine:   No vertebral fracture is recognized.  Moderate  degenerative disc disease and spondylosis at C4-5 and severe at C5-6 and C6-7 with loss of disc height and associated degenerative endplate changes. There is narrowing of the RIGHT C2-3, BILATERAL C3-4, C4-5 and C5-6 and C6-7 neural foramina due to uncovertebral spurring and facet osteophytic hypertrophy.    3/27/22: CT Chest, Abdomen and Pelvis w/ IV Cont: No sequela of trauma. Prominent left-sided bladder diverticulum. Enlarged prostate.    3/27/22: X-ray Right Hand Arthritic narrowing of the interphalangeal joints and first metacarpal phalangeal joint. Carpal bones intact. No fracture or dislocation.    3/27/22: X-ray Right Knee No radiographic osseous pathology.        Cardiac Testing:  Trops 154.16, 182.32    BNP 4120    ECHO 3/28/22:  Estimated left ventricular ejection fraction is 60-65%.  The left ventricle is normal in size, wall motion and contractility as  seen in limited views. Mild concentric left ventricular hypertrophy is present.  The left atrium is mildly dilated. The aortic valve is well visualized, appears mildly calcified. Valve  opening seems to be normal. Mild (1+) aortic regurgitation is present.  The mitral valve leaflets appear thickened. Mild (1+) mitral regurgitation is present.  EA reversal of the mitral inflow consistent with reduced compliance of   the left ventricle. Normal appearing tricuspid valve structure. Moderate (2+) tricuspid valve regurgitation is present.  Mild pulmonary hypertension. Pulmonic valve not well seen. Trace pulmonic valvular regurgitation is present.  The IVC appears normal. Ascending aorta is within the upper limits of normal (4.0 cm).        Medications:  MEDICATIONS  (STANDING):  aspirin  chewable 81 milliGRAM(s) Oral daily  cefTRIAXone   IVPB 1000 milliGRAM(s) IV Intermittent every 24 hours  ketorolac 0.5% Ophthalmic Solution 1 Drop(s) Both EYES four times a day  metoprolol succinate ER 12.5 milliGRAM(s) Oral daily  simvastatin 40 milliGRAM(s) Oral at bedtime  sodium chloride 0.9%. 1000 milliLiter(s) (75 mL/Hr) IV Continuous <Continuous>  tamsulosin 0.4 milliGRAM(s) Oral at bedtime      Home Medications:  aspirin 81 mg oral tablet: 1 tab(s) orally once a day (27 Mar 2022 18:56)  ketorolac 0.5% ophthalmic solution: 1 drop(s) to each affected eye 4 times a day (27 Mar 2022 18:56)  metoprolol succinate 25 mg oral tablet, extended release: 0.5 tab(s) orally once a day (27 Mar 2022 18:56)  simvastatin 40 mg oral tablet: 1 tab(s) orally once a day (at bedtime) (27 Mar 2022 18:56)  tamsulosin 0.4 mg oral capsule: 1 cap(s) orally once a day (27 Mar 2022 18:56)   Chief Complaint: syncopal fall    HPI: 89 y/o M with PMHx of hld, htn, pvd, atherosclerosis, CAD s/p CABG, was brought to ED from home s/p fall. Daughter states patient fell last night and was not able to get up. Patient was found on the floor when daughter visited early afternoon today. He has multiple abrasion on right knee and right wrist. Daughter states patient lives alone and was supposed to go see his girlfriend today but didn't go. Girlfriend called patient but no answer and then daughter called patient and no answer. When daughter visited today, patient was found on the wooden floor of his bedroom. He does not know if he passed out. Denies any chest pain.   Trauma work up neg (27 Mar 2022 18:49)    3/29/22: Pt seen and examined at bedside. Pt with no complaints. Pt in no distress.   All other review of systems is negative unless indicated above    Physical Exam:  T(C): 36.8 (29 Mar 2022 08:31), Max: 36.8 (29 Mar 2022 08:31)  T(F): 98.3 (29 Mar 2022 08:31), Max: 98.3 (29 Mar 2022 08:31)  HR: 63 (29 Mar 2022 08:31) (58 - 70)  BP: 151/77 (29 Mar 2022 08:31) (110/51 - 151/77)  RR: 18 (29 Mar 2022 08:31) (18 - 18)  SpO2: 95% (29 Mar 2022 08:31) (95% - 96%)    Constitutional: NAD, awake and alert  HEENT: PERR, EOMI, Normal Hearing, MMM  Neck: Soft and supple, No LAD, No JVD  Respiratory: Breath sounds are clear bilaterally, No wheezing, rales or rhonchi  Cardiovascular: S1 and S2, regular rate and rhythm, no Murmurs, gallops or rubs  Gastrointestinal: Bowel Sounds present, soft, nontender, nondistended, no guarding, no rebound  Extremities: No peripheral edema  Vascular: 2+ peripheral pulses  Neurological: A/O x 3, no focal deficits  Musculoskeletal: 5/5 strength b/l upper and lower extremities  Skin: RT hand skin tear    Labs:             12.0   11.33 )-----------( 110      ( 29 Mar 2022 07:25 )             36.2     03-29    141  |  115<H>  |  34<H>  ----------------------------<  90  3.8   |  20<L>  |  0.82    Ca    7.7<L>      29 Mar 2022 07:25  Phos  2.8     03-28  Mg     2.1     03-29    TPro  7.0  /  Alb  3.3  /  TBili  1.0  /  DBili  x   /  AST  85<H>  /  ALT  29  /  AlkPhos  67  03-27    PT/INR - ( 27 Mar 2022 15:23 )   PT: 14.5 sec;   INR: 1.25 ratio       PTT - ( 27 Mar 2022 15:23 )  PTT:29.5 sec    CARDIAC MARKERS ( 29 Mar 2022 07:25 )  x     / x     / 6630 U/L / x     / x      CARDIAC MARKERS ( 28 Mar 2022 07:52 )  x     / x     / 26347 U/L / x     / x      CARDIAC MARKERS ( 27 Mar 2022 15:23 )  x     / x     / 4120 U/L / x     / x        Micro:    UA (+)    Urine Culture (-)    SARS-CoV-2: NotDetec (27 Mar 2022 15:23)    3/27/22 Blood Cultures (-)    Radiology:    3/27/22: CT Head No Cont: CT HEAD: Mild periventricular white matter ischemia with 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 Moderate atrophy. CT cervical spine:   No vertebral fracture is recognized.  Moderate  degenerative disc disease and spondylosis at C4-5 and severe at C5-6 and C6-7 with loss of disc height and associated degenerative endplate changes. There is narrowing of the RIGHT C2-3, BILATERAL C3-4, C4-5 and C5-6 and C6-7 neural foramina due to uncovertebral spurring and facet osteophytic hypertrophy.    3/27/22: CT Chest, Abdomen and Pelvis w/ IV Cont: No sequela of trauma. Prominent left-sided bladder diverticulum. Enlarged prostate.    3/27/22: X-ray Right Hand Arthritic narrowing of the interphalangeal joints and first metacarpal phalangeal joint. Carpal bones intact. No fracture or dislocation.    3/27/22: X-ray Right Knee No radiographic osseous pathology.      Cardiac Testing:    Trops 154.16, 182.32    BNP 4120    3/28/22: ECHO: Estimated left ventricular ejection fraction is 60-65%. The left ventricle is normal in size, wall motion and contractility as seen in limited views. Mild concentric left ventricular hypertrophy is present. The left atrium is mildly dilated. The aortic valve is well visualized, appears mildly calcified. Valve opening seems to be normal. Mild (1+) aortic regurgitation is present. The mitral valve leaflets appear thickened. Mild (1+) mitral regurgitation is present. EA reversal of the mitral inflow consistent with reduced compliance of the left ventricle. Normal appearing tricuspid valve structure. Moderate (2+) tricuspid valve regurgitation is present. Mild pulmonary hypertension. Pulmonic valve not well seen. Trace pulmonic valvular regurgitation is present. The IVC appears normal. Ascending aorta is within the upper limits of normal (4.0 cm).    Medications:  aspirin  chewable 81 milliGRAM(s) Oral daily  cefTRIAXone   IVPB 1000 milliGRAM(s) IV Intermittent every 24 hours  ketorolac 0.5% Ophthalmic Solution 1 Drop(s) Both EYES four times a day  metoprolol succinate ER 12.5 milliGRAM(s) Oral daily  simvastatin 40 milliGRAM(s) Oral at bedtime  sodium chloride 0.9%. 1000 milliLiter(s) (75 mL/Hr) IV Continuous <Continuous>  tamsulosin 0.4 milliGRAM(s) Oral at bedtime    Home Medications:  aspirin 81 mg oral tablet: 1 tab(s) orally once a day (27 Mar 2022 18:56)  ketorolac 0.5% ophthalmic solution: 1 drop(s) to each affected eye 4 times a day (27 Mar 2022 18:56)  metoprolol succinate 25 mg oral tablet, extended release: 0.5 tab(s) orally once a day (27 Mar 2022 18:56)  simvastatin 40 mg oral tablet: 1 tab(s) orally once a day (at bedtime) (27 Mar 2022 18:56)  tamsulosin 0.4 mg oral capsule: 1 cap(s) orally once a day (27 Mar 2022 18:56)

## 2022-03-29 NOTE — SBIRT NOTE ADULT - NSSBIRTALCPOSREINDET_GEN_A_CORE
Reinforced importance of adhering to guidelines provided for healthy ETOH consumption. Pt verbalizes understanding.

## 2022-03-30 ENCOUNTER — TRANSCRIPTION ENCOUNTER (OUTPATIENT)
Age: 87
End: 2022-03-30

## 2022-03-30 VITALS
RESPIRATION RATE: 18 BRPM | TEMPERATURE: 99 F | OXYGEN SATURATION: 92 % | SYSTOLIC BLOOD PRESSURE: 142 MMHG | DIASTOLIC BLOOD PRESSURE: 63 MMHG

## 2022-03-30 LAB
ANION GAP SERPL CALC-SCNC: 5 MMOL/L — SIGNIFICANT CHANGE UP (ref 5–17)
BUN SERPL-MCNC: 25 MG/DL — HIGH (ref 7–23)
CALCIUM SERPL-MCNC: 7.6 MG/DL — LOW (ref 8.5–10.1)
CHLORIDE SERPL-SCNC: 113 MMOL/L — HIGH (ref 96–108)
CK SERPL-CCNC: 2662 U/L — HIGH (ref 26–308)
CO2 SERPL-SCNC: 22 MMOL/L — SIGNIFICANT CHANGE UP (ref 22–31)
CREAT SERPL-MCNC: 0.74 MG/DL — SIGNIFICANT CHANGE UP (ref 0.5–1.3)
EGFR: 83 ML/MIN/1.73M2 — SIGNIFICANT CHANGE UP
GLUCOSE SERPL-MCNC: 92 MG/DL — SIGNIFICANT CHANGE UP (ref 70–99)
POTASSIUM SERPL-MCNC: 3.5 MMOL/L — SIGNIFICANT CHANGE UP (ref 3.5–5.3)
POTASSIUM SERPL-SCNC: 3.5 MMOL/L — SIGNIFICANT CHANGE UP (ref 3.5–5.3)
SODIUM SERPL-SCNC: 140 MMOL/L — SIGNIFICANT CHANGE UP (ref 135–145)

## 2022-03-30 PROCEDURE — 99239 HOSP IP/OBS DSCHRG MGMT >30: CPT

## 2022-03-30 RX ORDER — SIMVASTATIN 20 MG/1
1 TABLET, FILM COATED ORAL
Qty: 0 | Refills: 0 | DISCHARGE

## 2022-03-30 RX ORDER — ALBUTEROL 90 UG/1
2 AEROSOL, METERED ORAL EVERY 6 HOURS
Refills: 0 | Status: DISCONTINUED | OUTPATIENT
Start: 2022-03-30 | End: 2022-03-30

## 2022-03-30 RX ORDER — CEFUROXIME AXETIL 250 MG
1 TABLET ORAL
Qty: 6 | Refills: 0
Start: 2022-03-30 | End: 2022-04-01

## 2022-03-30 RX ORDER — SIMVASTATIN 20 MG/1
1 TABLET, FILM COATED ORAL
Qty: 0 | Refills: 0 | DISCHARGE
Start: 2022-03-30

## 2022-03-30 RX ADMIN — SODIUM CHLORIDE 75 MILLILITER(S): 9 INJECTION INTRAMUSCULAR; INTRAVENOUS; SUBCUTANEOUS at 05:25

## 2022-03-30 RX ADMIN — Medication 81 MILLIGRAM(S): at 09:29

## 2022-03-30 RX ADMIN — Medication 1 DROP(S): at 05:24

## 2022-03-30 RX ADMIN — ALBUTEROL 2 PUFF(S): 90 AEROSOL, METERED ORAL at 05:25

## 2022-03-30 RX ADMIN — Medication 1 DROP(S): at 11:06

## 2022-03-30 RX ADMIN — Medication 12.5 MILLIGRAM(S): at 05:25

## 2022-03-30 NOTE — DISCHARGE NOTE PROVIDER - CARE PROVIDER_API CALL
Nathaniel Suarez (DO)  Family Medicine  [address] [place] [address], [address]  [place] [address]  Phone: [phone]  Fax: [phone]  Follow Up Time: 1 week   Nathaniel Suarez (DO)  Family Medicine  [address] [facility], [address]  [place] [address]  Phone: [phone]  Fax: [phone]  Follow Up Time: 1 week    Kenneth Bell)  Cardiology  [address] [place] [address]  Phone: [phone]  Fax: [phone]  Follow Up Time: 1 week

## 2022-03-30 NOTE — DISCHARGE NOTE PROVIDER - PROVIDER TOKENS
PROVIDER:[TOKEN:[[id number]],FOLLOWUP:[1 week]] PROVIDER:[TOKEN:[[id number]],FOLLOWUP:[1 week]],PROVIDER:[TOKEN:[[id number]],FOLLOWUP:[1 week]]

## 2022-03-30 NOTE — DISCHARGE NOTE PROVIDER - NSFTFHOMEHTHYNRD_GEN_ALL_CORE
Pt discharged at this time,AOx3 at time of discharged. Pt verbalized understanding of discharge instructions. Encouraged questions, no questions at this time. Pt  ambulated to lobby with no incident. Carito Navarro MA     Yes

## 2022-03-30 NOTE — DISCHARGE NOTE PROVIDER - NSDCHHNEEDSERVICE_GEN_ALL_CORE
Medication teaching and assessment/Observation and assessment/Rehabilitation services/Teaching and training Observation and assessment/Rehabilitation services/Teaching and training

## 2022-03-30 NOTE — DISCHARGE NOTE PROVIDER - CARE PROVIDERS DIRECT ADDRESSES
[email or web address],DirectAddress_Unknown

## 2022-03-30 NOTE — DISCHARGE NOTE PROVIDER - NSDCMRMEDTOKEN_GEN_ALL_CORE_FT
aspirin 81 mg oral tablet: 1 tab(s) orally once a day  ketorolac 0.5% ophthalmic solution: 1 drop(s) to each affected eye 4 times a day  metoprolol succinate 25 mg oral tablet, extended release: 0.5 tab(s) orally once a day  simvastatin 40 mg oral tablet: 1 tab(s) orally once a day (at bedtime)  tamsulosin 0.4 mg oral capsule: 1 cap(s) orally once a day   aspirin 81 mg oral tablet: 1 tab(s) orally once a day  cefuroxime 500 mg oral tablet: 1 tab(s) orally 2 times a day   ketorolac 0.5% ophthalmic solution: 1 drop(s) to each affected eye 4 times a day  metoprolol succinate 25 mg oral tablet, extended release: 0.5 tab(s) orally once a day  simvastatin 40 mg oral tablet: 1 tab(s) orally once a day (at bedtime)  tamsulosin 0.4 mg oral capsule: 1 cap(s) orally once a day

## 2022-03-30 NOTE — DISCHARGE NOTE PROVIDER - HOSPITAL COURSE
89yo male with PMHx HLD, HTN, PVD, atherosclerosis, CAD s/p CABG presents s/p fall at home. Pt admitted to hospital medicine service for management of:     Fall. Possible syncope in the setting of Probable UTI  Monitor on tele. Run of NSVT  ?aberrancy on 3/28, no further events  CT imaging w/o acute fracture  Orthostatics negative  UA (+), UCx- no growth, but collected after abx initiated. Will continue with Ceftriaxone inpatient - transition to PO for 5d course  Echo: EF 60-65%    Elevated Troponins  Likely demand ischemia. Cardiology recommended no further testing, f/u outpatient cardiologist.   Monitor on tele. Run of NSVT  ?aberrancy on 3/28, no further events. Denies current CP  Trops 198.95, <- 182.32, <- 154.16  ECG: Sinus bradycardia  Echo: EF 60-65%, no significant valvulopathy   Continue with aspirin, simvastatin, Metoprolol (patient baseline SR-SB high 50s)  Cardio f/u outpatient     Rhabdomyolysis   CK now downtrending 6630 from 86656, s/p 1L bolus, C/w IV hydration  Repeat CK improved    JOSETTE  Improved. C/w IVF  Continue ABX for UTI -- transition to PO on d/c    Probable UTI  UA +, UCx- no growth, but collected after abx initiated. IV Ceftriaxone -- transition to PO in d/c    CAD s/p CABG  Continue with aspirin, simvastatin, Metoprolol   DASH/TLC diet    DVT ppx  SCDs    Physical Exam:  T(C): 37.2 (30 Mar 2022 07:09), Max: 37.2 (30 Mar 2022 07:09)  T(F): 99 (30 Mar 2022 07:09), Max: 99 (30 Mar 2022 07:09)  HR: 64 (30 Mar 2022 04:39) (64 - 66)  BP: 142/63 (30 Mar 2022 07:09) (107/57 - 154/60)  BP(mean): 82 (30 Mar 2022 07:09) (82 - 82)  RR: 18 (30 Mar 2022 07:09) (18 - 18)  SpO2: 92% (30 Mar 2022 07:09) (92% - 93%)    Constitutional: NAD, awake and alert  HEENT: PERR, EOMI, Normal Hearing, MMM  Neck: Soft and supple, No LAD, No JVD  Respiratory: Breath sounds are clear bilaterally, No wheezing, rales or rhonchi  Cardiovascular: S1 and S2, regular rate and rhythm, no Murmurs, gallops or rubs  Gastrointestinal: Bowel Sounds present, soft, nontender, nondistended, no guarding, no rebound  Extremities: No peripheral edema  Vascular: 2+ peripheral pulses  Neurological: A/O x 3, no focal deficits  Musculoskeletal: 5/5 strength b/l upper and lower extremities  Skin: RT hand skin tear    Labs: Reviewed    Micro:  UA (+). 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 Moderate atrophy. CT cervical spine:   No vertebral fracture is recognized.  Moderate  degenerative disc disease and spondylosis at C4-5 and severe at C5-6 and C6-7 with loss of disc height and associated degenerative endplate changes. There is narrowing of the RIGHT C2-3, BILATERAL C3-4, C4-5 and C5-6 and C6-7 neural foramina due to uncovertebral spurring and facet osteophytic hypertrophy.    3/27/22: CT Chest, Abdomen and Pelvis w/ IV Cont: No sequela of trauma. Prominent left-sided bladder diverticulum. Enlarged prostate.  3/27/22: X-ray Right Hand Arthritic narrowing of the interphalangeal joints and first metacarpal phalangeal joint. Carpal bones intact. No fracture or dislocation.  3/27/22: X-ray Right Knee No radiographic osseous pathology.    Cardiac Testing:    Trops 154.16, 182.32    BNP 4120    3/28/22: ECHO: Estimated left ventricular ejection fraction is 60-65%. The left ventricle is normal in size, wall motion and contractility as seen in limited views. Mild concentric left ventricular hypertrophy is present. The left atrium is mildly dilated. The aortic valve is well visualized, appears mildly calcified. Valve opening seems to be normal. Mild (1+) aortic regurgitation is present. The mitral valve leaflets appear thickened. Mild (1+) mitral regurgitation is present. EA reversal of the mitral inflow consistent with reduced compliance of the left ventricle. Normal appearing tricuspid valve structure. Moderate (2+) tricuspid valve regurgitation is present. Mild pulmonary hypertension. Pulmonic valve not well seen. Trace pulmonic valvular regurgitation is present. The IVC appears normal. Ascending aorta is within the upper limits of normal (4.0 cm).    Medications: reviewed 89yo male with PMHx HLD, HTN, PVD, atherosclerosis, CAD s/p CABG presents s/p fall at home. Pt admitted to hospital medicine service for management of:     Fall. Possible syncope in the setting of Probable UTI  Monitor on tele. Run of NSVT  ?aberrancy on 3/28, no further events  CT imaging w/o acute fracture  Orthostatics negative  UA (+), UCx- no growth, but collected after abx initiated. Will continue with Ceftriaxone inpatient - transition to PO for 7d course  Echo: EF 60-65%    Elevated Troponins  Likely demand ischemia. Cardiology recommended no further testing, f/u outpatient cardiologist.   Monitor on tele. Run of NSVT  ?aberrancy on 3/28, no further events. Denies current CP  Trops 198.95, <- 182.32, <- 154.16  ECG: Sinus bradycardia  Echo: EF 60-65%, no significant valvulopathy   Continue with aspirin, simvastatin, Metoprolol (patient baseline SR-SB high 50s)  Cardio f/u outpatient     Rhabdomyolysis   CK now downtrending 6630 from [id number], s/p 1L bolus, C/w IV hydration  Repeat CK improved    JOSETTE  Improved. C/w IVF  Continue ABX for UTI -- transition to PO on d/c    Probable UTI  UA +, UCx- no growth, but collected after abx initiated. IV Ceftriaxone -- transition to PO in d/c    CAD s/p CABG  Continue with aspirin, simvastatin, Metoprolol   DASH/TLC diet    DVT ppx  SCDs    Physical Exam:  T(C): 37.2 (30 Mar 2022 07:09), Max: 37.2 (30 Mar 2022 07:09)  T(F): 99 (30 Mar 2022 07:09), Max: 99 (30 Mar 2022 07:09)  HR: 64 (30 Mar 2022 04:39) (64 - 66)  BP: 142/63 (30 Mar 2022 07:09) (107/57 - 154/60)  BP(mean): 82 (30 Mar 2022 07:09) (82 - 82)  RR: 18 (30 Mar 2022 07:09) (18 - 18)  SpO2: 92% (30 Mar 2022 07:09) (92% - 93%)    Constitutional: NAD, awake and alert  HEENT: PERR, EOMI, Normal Hearing, MMM  Neck: Soft and supple, No LAD, No JVD  Respiratory: Breath sounds are clear bilaterally, No wheezing, rales or rhonchi  Cardiovascular: S1 and S2, regular rate and rhythm, no Murmurs, gallops or rubs  Gastrointestinal: Bowel Sounds present, soft, nontender, nondistended, no guarding, no rebound  Extremities: No peripheral edema  Vascular: 2+ peripheral pulses  Neurological: A/O x 3, no focal deficits  Musculoskeletal: 5/5 strength b/l upper and lower extremities  Skin: RT hand skin tear    Labs: Reviewed    Micro:  UA (+). 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 Moderate atrophy. CT cervical spine:   No vertebral fracture is recognized.  Moderate  degenerative disc disease and spondylosis at C4-5 and severe at C5-6 and C6-7 with loss of disc height and associated degenerative endplate changes. There is narrowing of the RIGHT C2-3, BILATERAL C3-4, C4-5 and C5-6 and C6-7 neural foramina due to uncovertebral spurring and facet osteophytic hypertrophy.    3/27/22: CT Chest, Abdomen and Pelvis w/ IV Cont: No sequela of trauma. Prominent left-sided bladder diverticulum. Enlarged prostate.  3/27/22: X-ray Right Hand Arthritic narrowing of the interphalangeal joints and first metacarpal phalangeal joint. Carpal bones intact. No fracture or dislocation.  3/27/22: X-ray Right Knee No radiographic osseous pathology.    Cardiac Testing:    Trops 154.16, 182.32    BNP 4120    3/28/22: ECHO: Estimated left ventricular ejection fraction is 60-65%. The left ventricle is normal in size, wall motion and contractility as seen in limited views. Mild concentric left ventricular hypertrophy is present. The left atrium is mildly dilated. The aortic valve is well visualized, appears mildly calcified. Valve opening seems to be normal. Mild (1+) aortic regurgitation is present. The mitral valve leaflets appear thickened. Mild (1+) mitral regurgitation is present. EA reversal of the mitral inflow consistent with reduced compliance of the left ventricle. Normal appearing tricuspid valve structure. Moderate (2+) tricuspid valve regurgitation is present. Mild pulmonary hypertension. Pulmonic valve not well seen. Trace pulmonic valvular regurgitation is present. The IVC appears normal. Ascending aorta is within the upper limits of normal (4.0 cm).    Medications: reviewed

## 2022-03-30 NOTE — DISCHARGE NOTE PROVIDER - DETAILS OF MALNUTRITION DIAGNOSIS/DIAGNOSES
This patient has been assessed with a concern for Malnutrition and was treated during this hospitalization for the following Nutrition diagnosis/diagnoses:     -  03/29/2022: Moderate protein-calorie malnutrition

## 2022-03-30 NOTE — DISCHARGE NOTE PROVIDER - NSDCCPCAREPLAN_GEN_ALL_CORE_FT
PRINCIPAL DISCHARGE DIAGNOSIS  Diagnosis: Fall  Assessment and Plan of Treatment: You were admitted due to fall and possible syncope due to a urinary tract infection. Continue course of antibiotics and stay well hydrated!      SECONDARY DISCHARGE DIAGNOSES  Diagnosis: Acute UTI (urinary tract infection)  Assessment and Plan of Treatment: WHAT IS A URINARY TRACT INFECTION? A urinary tract infection (UTI) is caused by bacteria that gets inside your urinary tract. You urinary tract includes your kidneys, ureters, and bladder.  THINGS TO DO: (1) Urinate when you feel the urge – do not hold your urine (2) Wear cotton underwear to prevent the trapping of moisture (3) Women should wipe front to back to prevent the translocation of germs  MONITOR THESE SIGNS AND SYMPTOMS: (1) Urinating very little or not at all (2) Vomiting (3) Fever > 100.4. If you experience any of these, DO alert your primary care provider, or return to the Emergency Department if you feel very sick.    Diagnosis: Elevated troponin  Assessment and Plan of Treatment: You were found to have Elevated Troponins (Cardiac enzymes). Your echocardiogram showed normal heart function.  Your ECG did not show signs of ischemic injury. Follow up with [facility] cardiologist or primary care provider after discharge for further management.    Diagnosis: Rhabdomyolysis  Assessment and Plan of Treatment: Because of you fall you developed rhabdomyolysis which is a condition where injured muscles release harmful substances into the bloodstream. Large amounts of these substances may damage your kidneys and other organs.   Continue to stay hydrated!  Follow up with your primary care provider for further management.     PRINCIPAL DISCHARGE DIAGNOSIS  Diagnosis: Fall  Assessment and Plan of Treatment: You were admitted due to fall and possible syncope due to a urinary tract infection. Continue course of antibiotics and stay well hydrated!      SECONDARY DISCHARGE DIAGNOSES  Diagnosis: Acute UTI (urinary tract infection)  Assessment and Plan of Treatment: WHAT IS A URINARY TRACT INFECTION? A urinary tract infection (UTI) is caused by bacteria that gets inside your urinary tract. You urinary tract includes your kidneys, ureters, and bladder.  THINGS TO DO: (1) Urinate when you feel the urge – do not hold your urine (2) Wear cotton underwear to prevent the trapping of moisture (3) Women should wipe front to back to prevent the translocation of germs  MONITOR THESE SIGNS AND SYMPTOMS: (1) Urinating very little or not at all (2) Vomiting (3) Fever > 100.4. If you experience any of these, DO alert your primary care provider, or return to the Emergency Department if you feel very sick.    Diagnosis: Elevated troponin  Assessment and Plan of Treatment: You were found to have Elevated Troponins (Cardiac enzymes). Your echocardiogram showed normal heart function.  Your ECG did not show signs of ischemic injury. Follow up with your cardiologist or primary care provider after discharge for further management.    Diagnosis: Rhabdomyolysis  Assessment and Plan of Treatment: Because of you fall you developed rhabdomyolysis which is a condition where injured muscles release harmful substances into the bloodstream. Large amounts of these substances may damage your kidneys and other organs.   Continue to stay hydrated!  Follow up with your primary care provider for further management.

## 2022-03-30 NOTE — DISCHARGE NOTE PROVIDER - NSDCFUADDAPPT_GEN_ALL_CORE_FT
PCP- follow up in 1 week. Bring these papers with you to that appointment so your PCP can request records from your hospital stay. PCP- follow up in 1 week. Bring these papers with you to that appointment so your PCP can request records from your hospital stay.    Follow up Appointment Made with Dr. Suarez (PCP) on 4/4/22 @ 2:30PM, when your arrive to office please call from parking lot.

## 2022-04-02 DIAGNOSIS — E78.5 HYPERLIPIDEMIA, UNSPECIFIED: ICD-10-CM

## 2022-04-02 DIAGNOSIS — I47.1 SUPRAVENTRICULAR TACHYCARDIA: ICD-10-CM

## 2022-04-02 DIAGNOSIS — Z20.822 CONTACT WITH AND (SUSPECTED) EXPOSURE TO COVID-19: ICD-10-CM

## 2022-04-02 DIAGNOSIS — M62.82 RHABDOMYOLYSIS: ICD-10-CM

## 2022-04-02 DIAGNOSIS — I25.10 ATHEROSCLEROTIC HEART DISEASE OF NATIVE CORONARY ARTERY WITHOUT ANGINA PECTORIS: ICD-10-CM

## 2022-04-02 DIAGNOSIS — E44.0 MODERATE PROTEIN-CALORIE MALNUTRITION: ICD-10-CM

## 2022-04-02 DIAGNOSIS — Z79.82 LONG TERM (CURRENT) USE OF ASPIRIN: ICD-10-CM

## 2022-04-02 DIAGNOSIS — N17.9 ACUTE KIDNEY FAILURE, UNSPECIFIED: ICD-10-CM

## 2022-04-02 DIAGNOSIS — I73.9 PERIPHERAL VASCULAR DISEASE, UNSPECIFIED: ICD-10-CM

## 2022-04-02 DIAGNOSIS — I10 ESSENTIAL (PRIMARY) HYPERTENSION: ICD-10-CM

## 2022-04-02 DIAGNOSIS — N39.0 URINARY TRACT INFECTION, SITE NOT SPECIFIED: ICD-10-CM

## 2022-04-02 DIAGNOSIS — Z95.1 PRESENCE OF AORTOCORONARY BYPASS GRAFT: ICD-10-CM

## 2022-04-02 LAB
CULTURE RESULTS: SIGNIFICANT CHANGE UP
CULTURE RESULTS: SIGNIFICANT CHANGE UP
SPECIMEN SOURCE: SIGNIFICANT CHANGE UP
SPECIMEN SOURCE: SIGNIFICANT CHANGE UP

## 2024-08-07 DEVICE — GUIDEWIRE TERUMO GLIDEWIRE STIFF STRAGHT .035" X 180CM: Type: IMPLANTABLE DEVICE | Site: LEFT | Status: FUNCTIONAL

## 2024-08-07 DEVICE — STONE BASKET GEMINI HELICAL 3FR 90CM X 11MM 3-WIRE OD X 0 TIP: Type: IMPLANTABLE DEVICE | Site: LEFT | Status: FUNCTIONAL

## 2024-08-07 DEVICE — GUIDEWIRE SENSOR DUAL-FLEX NITINOL STRAIGHT .035" X 150CM: Type: IMPLANTABLE DEVICE | Site: LEFT | Status: FUNCTIONAL

## 2024-08-07 DEVICE — BALLOON CATH UROMAX ULTRA KIT 15FR X 4CM: Type: IMPLANTABLE DEVICE | Site: LEFT | Status: FUNCTIONAL

## 2024-08-07 DEVICE — GUIDEWIRE FLEX TIP STRAIGHT .035" X 150CM: Type: IMPLANTABLE DEVICE | Site: LEFT | Status: FUNCTIONAL

## 2024-08-07 DEVICE — URETERAL CATH OPEN END FLEXI-TIP 5FR .038" X 70CM: Type: IMPLANTABLE DEVICE | Site: LEFT | Status: FUNCTIONAL

## 2024-08-07 DEVICE — URETERAL STENT CONTOUR VL 4.8FR 22-30CM: Type: IMPLANTABLE DEVICE | Site: LEFT | Status: FUNCTIONAL

## 2024-08-08 PROBLEM — E78.5 HYPERLIPIDEMIA, UNSPECIFIED: Chronic | Status: ACTIVE | Noted: 2022-03-28

## 2024-08-08 PROBLEM — I10 ESSENTIAL (PRIMARY) HYPERTENSION: Chronic | Status: ACTIVE | Noted: 2022-03-28

## 2024-08-08 PROBLEM — I70.90 UNSPECIFIED ATHEROSCLEROSIS: Chronic | Status: ACTIVE | Noted: 2022-03-28

## 2024-08-08 PROBLEM — I25.10 ATHEROSCLEROTIC HEART DISEASE OF NATIVE CORONARY ARTERY WITHOUT ANGINA PECTORIS: Chronic | Status: ACTIVE | Noted: 2022-03-28

## 2024-08-08 PROBLEM — I73.9 PERIPHERAL VASCULAR DISEASE, UNSPECIFIED: Chronic | Status: ACTIVE | Noted: 2022-03-28

## 2024-08-14 ENCOUNTER — TRANSCRIPTION ENCOUNTER (OUTPATIENT)
Age: 89
End: 2024-08-14

## 2024-11-02 ENCOUNTER — EMERGENCY (EMERGENCY)
Facility: HOSPITAL | Age: 89
LOS: 1 days | Discharge: ANOTHER HOSPITAL | End: 2024-11-02
Attending: STUDENT IN AN ORGANIZED HEALTH CARE EDUCATION/TRAINING PROGRAM | Admitting: STUDENT IN AN ORGANIZED HEALTH CARE EDUCATION/TRAINING PROGRAM
Payer: MEDICARE

## 2024-11-02 ENCOUNTER — TRANSCRIPTION ENCOUNTER (OUTPATIENT)
Age: 89
End: 2024-11-02

## 2024-11-02 ENCOUNTER — INPATIENT (INPATIENT)
Facility: HOSPITAL | Age: 89
LOS: 4 days | Discharge: NURSING, REHAB OR LONG-TERM CARE | DRG: 66 | End: 2024-11-07
Attending: STUDENT IN AN ORGANIZED HEALTH CARE EDUCATION/TRAINING PROGRAM | Admitting: STUDENT IN AN ORGANIZED HEALTH CARE EDUCATION/TRAINING PROGRAM
Payer: MEDICARE

## 2024-11-02 VITALS
TEMPERATURE: 97 F | SYSTOLIC BLOOD PRESSURE: 115 MMHG | RESPIRATION RATE: 16 BRPM | HEIGHT: 70 IN | DIASTOLIC BLOOD PRESSURE: 67 MMHG | OXYGEN SATURATION: 94 % | HEART RATE: 79 BPM | WEIGHT: 164.91 LBS

## 2024-11-02 VITALS
DIASTOLIC BLOOD PRESSURE: 83 MMHG | HEIGHT: 64 IN | TEMPERATURE: 98 F | WEIGHT: 123.02 LBS | RESPIRATION RATE: 18 BRPM | HEART RATE: 72 BPM | SYSTOLIC BLOOD PRESSURE: 149 MMHG | OXYGEN SATURATION: 98 %

## 2024-11-02 VITALS
TEMPERATURE: 98 F | RESPIRATION RATE: 16 BRPM | HEART RATE: 84 BPM | OXYGEN SATURATION: 97 % | DIASTOLIC BLOOD PRESSURE: 92 MMHG | SYSTOLIC BLOOD PRESSURE: 174 MMHG

## 2024-11-02 DIAGNOSIS — I62.00 NONTRAUMATIC SUBDURAL HEMORRHAGE, UNSPECIFIED: ICD-10-CM

## 2024-11-02 LAB
ALBUMIN SERPL ELPH-MCNC: 3.1 G/DL — LOW (ref 3.3–5)
ALBUMIN SERPL ELPH-MCNC: 3.2 G/DL — LOW (ref 3.3–5)
ALP SERPL-CCNC: 75 U/L — SIGNIFICANT CHANGE UP (ref 40–120)
ALP SERPL-CCNC: 80 U/L — SIGNIFICANT CHANGE UP (ref 40–120)
ALT FLD-CCNC: 15 U/L — SIGNIFICANT CHANGE UP (ref 10–45)
ALT FLD-CCNC: 23 U/L — SIGNIFICANT CHANGE UP (ref 12–78)
ANION GAP SERPL CALC-SCNC: 11 MMOL/L — SIGNIFICANT CHANGE UP (ref 5–17)
ANION GAP SERPL CALC-SCNC: 3 MMOL/L — LOW (ref 5–17)
ANION GAP SERPL CALC-SCNC: 7 MMOL/L — SIGNIFICANT CHANGE UP (ref 5–17)
APPEARANCE UR: ABNORMAL
APPEARANCE UR: ABNORMAL
APTT BLD: 26.4 SEC — SIGNIFICANT CHANGE UP (ref 24.5–35.6)
APTT BLD: 29.2 SEC — SIGNIFICANT CHANGE UP (ref 24.5–35.6)
AST SERPL-CCNC: 22 U/L — SIGNIFICANT CHANGE UP (ref 10–40)
AST SERPL-CCNC: 24 U/L — SIGNIFICANT CHANGE UP (ref 15–37)
BACTERIA # UR AUTO: NEGATIVE /HPF — SIGNIFICANT CHANGE UP
BASOPHILS # BLD AUTO: 0.02 K/UL — SIGNIFICANT CHANGE UP (ref 0–0.2)
BASOPHILS # BLD AUTO: 0.04 K/UL — SIGNIFICANT CHANGE UP (ref 0–0.2)
BASOPHILS NFR BLD AUTO: 0.3 % — SIGNIFICANT CHANGE UP (ref 0–2)
BASOPHILS NFR BLD AUTO: 0.6 % — SIGNIFICANT CHANGE UP (ref 0–2)
BILIRUB SERPL-MCNC: 0.3 MG/DL — SIGNIFICANT CHANGE UP (ref 0.2–1.2)
BILIRUB SERPL-MCNC: 0.4 MG/DL — SIGNIFICANT CHANGE UP (ref 0.2–1.2)
BILIRUB UR-MCNC: NEGATIVE — SIGNIFICANT CHANGE UP
BILIRUB UR-MCNC: NEGATIVE — SIGNIFICANT CHANGE UP
BLD GP AB SCN SERPL QL: NEGATIVE — SIGNIFICANT CHANGE UP
BUN SERPL-MCNC: 28 MG/DL — HIGH (ref 7–23)
BUN SERPL-MCNC: 30 MG/DL — HIGH (ref 7–23)
BUN SERPL-MCNC: 31 MG/DL — HIGH (ref 7–23)
CALCIUM SERPL-MCNC: 7.9 MG/DL — LOW (ref 8.4–10.5)
CALCIUM SERPL-MCNC: 8 MG/DL — LOW (ref 8.4–10.5)
CALCIUM SERPL-MCNC: 8.8 MG/DL — SIGNIFICANT CHANGE UP (ref 8.5–10.1)
CAST: 3 /LPF — SIGNIFICANT CHANGE UP (ref 0–4)
CHLORIDE SERPL-SCNC: 108 MMOL/L — SIGNIFICANT CHANGE UP (ref 96–108)
CHLORIDE SERPL-SCNC: 110 MMOL/L — HIGH (ref 96–108)
CHLORIDE SERPL-SCNC: 111 MMOL/L — HIGH (ref 96–108)
CO2 SERPL-SCNC: 22 MMOL/L — SIGNIFICANT CHANGE UP (ref 22–31)
CO2 SERPL-SCNC: 25 MMOL/L — SIGNIFICANT CHANGE UP (ref 22–31)
CO2 SERPL-SCNC: 28 MMOL/L — SIGNIFICANT CHANGE UP (ref 22–31)
COLOR SPEC: YELLOW — SIGNIFICANT CHANGE UP
COLOR SPEC: YELLOW — SIGNIFICANT CHANGE UP
CREAT SERPL-MCNC: 0.79 MG/DL — SIGNIFICANT CHANGE UP (ref 0.5–1.3)
CREAT SERPL-MCNC: 0.93 MG/DL — SIGNIFICANT CHANGE UP (ref 0.5–1.3)
CREAT SERPL-MCNC: 0.96 MG/DL — SIGNIFICANT CHANGE UP (ref 0.5–1.3)
DIFF PNL FLD: ABNORMAL
DIFF PNL FLD: ABNORMAL
EGFR: 71 ML/MIN/1.73M2 — SIGNIFICANT CHANGE UP
EGFR: 74 ML/MIN/1.73M2 — SIGNIFICANT CHANGE UP
EGFR: 80 ML/MIN/1.73M2 — SIGNIFICANT CHANGE UP
EOSINOPHIL # BLD AUTO: 0.11 K/UL — SIGNIFICANT CHANGE UP (ref 0–0.5)
EOSINOPHIL # BLD AUTO: 0.13 K/UL — SIGNIFICANT CHANGE UP (ref 0–0.5)
EOSINOPHIL NFR BLD AUTO: 1.7 % — SIGNIFICANT CHANGE UP (ref 0–6)
EOSINOPHIL NFR BLD AUTO: 2 % — SIGNIFICANT CHANGE UP (ref 0–6)
GAS PNL BLDA: SIGNIFICANT CHANGE UP
GLUCOSE SERPL-MCNC: 105 MG/DL — HIGH (ref 70–99)
GLUCOSE SERPL-MCNC: 94 MG/DL — SIGNIFICANT CHANGE UP (ref 70–99)
GLUCOSE SERPL-MCNC: 95 MG/DL — SIGNIFICANT CHANGE UP (ref 70–99)
GLUCOSE UR QL: NEGATIVE MG/DL — SIGNIFICANT CHANGE UP
GLUCOSE UR QL: NEGATIVE MG/DL — SIGNIFICANT CHANGE UP
HCT VFR BLD CALC: 26.3 % — LOW (ref 39–50)
HCT VFR BLD CALC: 28.1 % — LOW (ref 39–50)
HCT VFR BLD CALC: 31.4 % — LOW (ref 39–50)
HEPARINASE TEG R TIME: 4.3 MIN — SIGNIFICANT CHANGE UP (ref 4.3–8.3)
HGB BLD-MCNC: 10.1 G/DL — LOW (ref 13–17)
HGB BLD-MCNC: 8.1 G/DL — LOW (ref 13–17)
HGB BLD-MCNC: 8.8 G/DL — LOW (ref 13–17)
IMM GRANULOCYTES NFR BLD AUTO: 0.3 % — SIGNIFICANT CHANGE UP (ref 0–0.9)
IMM GRANULOCYTES NFR BLD AUTO: 0.3 % — SIGNIFICANT CHANGE UP (ref 0–0.9)
INR BLD: 1.09 RATIO — SIGNIFICANT CHANGE UP (ref 0.85–1.16)
INR BLD: 1.11 RATIO — SIGNIFICANT CHANGE UP (ref 0.85–1.16)
KETONES UR-MCNC: NEGATIVE MG/DL — SIGNIFICANT CHANGE UP
KETONES UR-MCNC: NEGATIVE MG/DL — SIGNIFICANT CHANGE UP
LACTATE SERPL-SCNC: 0.6 MMOL/L — LOW (ref 0.7–2)
LEUKOCYTE ESTERASE UR-ACNC: ABNORMAL
LEUKOCYTE ESTERASE UR-ACNC: ABNORMAL
LYMPHOCYTES # BLD AUTO: 1.32 K/UL — SIGNIFICANT CHANGE UP (ref 1–3.3)
LYMPHOCYTES # BLD AUTO: 1.39 K/UL — SIGNIFICANT CHANGE UP (ref 1–3.3)
LYMPHOCYTES # BLD AUTO: 20.4 % — SIGNIFICANT CHANGE UP (ref 13–44)
LYMPHOCYTES # BLD AUTO: 21.1 % — SIGNIFICANT CHANGE UP (ref 13–44)
MAGNESIUM SERPL-MCNC: 2.1 MG/DL — SIGNIFICANT CHANGE UP (ref 1.6–2.6)
MCHC RBC-ENTMCNC: 30.8 G/DL — LOW (ref 32–36)
MCHC RBC-ENTMCNC: 30.8 PG — SIGNIFICANT CHANGE UP (ref 27–34)
MCHC RBC-ENTMCNC: 31.3 G/DL — LOW (ref 32–36)
MCHC RBC-ENTMCNC: 31.4 PG — SIGNIFICANT CHANGE UP (ref 27–34)
MCHC RBC-ENTMCNC: 31.6 PG — SIGNIFICANT CHANGE UP (ref 27–34)
MCHC RBC-ENTMCNC: 32.2 G/DL — SIGNIFICANT CHANGE UP (ref 32–36)
MCV RBC AUTO: 101.9 FL — HIGH (ref 80–100)
MCV RBC AUTO: 98.1 FL — SIGNIFICANT CHANGE UP (ref 80–100)
MCV RBC AUTO: 98.3 FL — SIGNIFICANT CHANGE UP (ref 80–100)
MONOCYTES # BLD AUTO: 0.54 K/UL — SIGNIFICANT CHANGE UP (ref 0–0.9)
MONOCYTES # BLD AUTO: 0.68 K/UL — SIGNIFICANT CHANGE UP (ref 0–0.9)
MONOCYTES NFR BLD AUTO: 10.3 % — SIGNIFICANT CHANGE UP (ref 2–14)
MONOCYTES NFR BLD AUTO: 8.4 % — SIGNIFICANT CHANGE UP (ref 2–14)
NEUTROPHILS # BLD AUTO: 4.34 K/UL — SIGNIFICANT CHANGE UP (ref 1.8–7.4)
NEUTROPHILS # BLD AUTO: 4.43 K/UL — SIGNIFICANT CHANGE UP (ref 1.8–7.4)
NEUTROPHILS NFR BLD AUTO: 66 % — SIGNIFICANT CHANGE UP (ref 43–77)
NEUTROPHILS NFR BLD AUTO: 68.6 % — SIGNIFICANT CHANGE UP (ref 43–77)
NITRITE UR-MCNC: NEGATIVE — SIGNIFICANT CHANGE UP
NITRITE UR-MCNC: NEGATIVE — SIGNIFICANT CHANGE UP
NRBC # BLD: 0 /100 WBCS — SIGNIFICANT CHANGE UP (ref 0–0)
NT-PROBNP SERPL-SCNC: 2528 PG/ML — HIGH (ref 0–450)
PA ADP PRP-ACNC: 371 PRU — HIGH (ref 182–335)
PH UR: 6.5 — SIGNIFICANT CHANGE UP (ref 5–8)
PH UR: 7 — SIGNIFICANT CHANGE UP (ref 5–8)
PHOSPHATE SERPL-MCNC: 2.9 MG/DL — SIGNIFICANT CHANGE UP (ref 2.5–4.5)
PLATELET # BLD AUTO: 172 K/UL — SIGNIFICANT CHANGE UP (ref 150–400)
PLATELET # BLD AUTO: 199 K/UL — SIGNIFICANT CHANGE UP (ref 150–400)
PLATELET # BLD AUTO: 219 K/UL — SIGNIFICANT CHANGE UP (ref 150–400)
PLATELET MAPPING ACTF MAX AMPLITUDE: 12.9 MM — SIGNIFICANT CHANGE UP (ref 2–19)
PLATELET MAPPING ADP MAXIMUM AMPLITUDE: 57.4 MM — SIGNIFICANT CHANGE UP (ref 45–69)
PLATELET MAPPING ADP PERCENT INHIBITION: 13.4 % — SIGNIFICANT CHANGE UP (ref 0–17)
PLATELET MAPPING ARACHIDONIC ACID INHIBITION: 3.9 % — SIGNIFICANT CHANGE UP (ref 0–11)
PLATELET MAPPING HKH MAXIMUM AMPLITUDE: 64.3 MM — SIGNIFICANT CHANGE UP (ref 53–68)
PLATELET RESPONSE ASPIRIN RESULT: 644 ARU — SIGNIFICANT CHANGE UP
POTASSIUM SERPL-MCNC: 3.6 MMOL/L — SIGNIFICANT CHANGE UP (ref 3.5–5.3)
POTASSIUM SERPL-MCNC: 3.9 MMOL/L — SIGNIFICANT CHANGE UP (ref 3.5–5.3)
POTASSIUM SERPL-MCNC: 4.1 MMOL/L — SIGNIFICANT CHANGE UP (ref 3.5–5.3)
POTASSIUM SERPL-SCNC: 3.6 MMOL/L — SIGNIFICANT CHANGE UP (ref 3.5–5.3)
POTASSIUM SERPL-SCNC: 3.9 MMOL/L — SIGNIFICANT CHANGE UP (ref 3.5–5.3)
POTASSIUM SERPL-SCNC: 4.1 MMOL/L — SIGNIFICANT CHANGE UP (ref 3.5–5.3)
PROCALCITONIN SERPL-MCNC: 0.03 NG/ML — SIGNIFICANT CHANGE UP
PROT SERPL-MCNC: 5.6 G/DL — LOW (ref 6–8.3)
PROT SERPL-MCNC: 6.2 G/DL — SIGNIFICANT CHANGE UP (ref 6–8.3)
PROT UR-MCNC: 100 MG/DL
PROT UR-MCNC: 30 MG/DL
PROTHROM AB SERPL-ACNC: 12.7 SEC — SIGNIFICANT CHANGE UP (ref 9.9–13.4)
PROTHROM AB SERPL-ACNC: 12.7 SEC — SIGNIFICANT CHANGE UP (ref 9.9–13.4)
RAPIDTEG MAXIMUM AMPLITUDE: 62.1 MM — SIGNIFICANT CHANGE UP (ref 52–70)
RBC # BLD: 2.58 M/UL — LOW (ref 4.2–5.8)
RBC # BLD: 2.86 M/UL — LOW (ref 4.2–5.8)
RBC # BLD: 3.2 M/UL — LOW (ref 4.2–5.8)
RBC # FLD: 16 % — HIGH (ref 10.3–14.5)
RBC # FLD: 16 % — HIGH (ref 10.3–14.5)
RBC # FLD: 16.3 % — HIGH (ref 10.3–14.5)
RBC CASTS # UR COMP ASSIST: 593 /HPF — HIGH (ref 0–4)
RH IG SCN BLD-IMP: POSITIVE — SIGNIFICANT CHANGE UP
SODIUM SERPL-SCNC: 141 MMOL/L — SIGNIFICANT CHANGE UP (ref 135–145)
SODIUM SERPL-SCNC: 142 MMOL/L — SIGNIFICANT CHANGE UP (ref 135–145)
SODIUM SERPL-SCNC: 142 MMOL/L — SIGNIFICANT CHANGE UP (ref 135–145)
SP GR SPEC: 1.02 — SIGNIFICANT CHANGE UP (ref 1–1.03)
SP GR SPEC: 1.02 — SIGNIFICANT CHANGE UP (ref 1–1.03)
SQUAMOUS # UR AUTO: 1 /HPF — SIGNIFICANT CHANGE UP (ref 0–5)
TEG FUNCTIONAL FIBRINOGEN: 19 MM — SIGNIFICANT CHANGE UP (ref 15–32)
TEG MAXIMUM AMPLITUDE: 63.2 MM — SIGNIFICANT CHANGE UP (ref 52–69)
TEG REACTION TIME: 3.9 MIN — LOW (ref 4.6–9.1)
TROPONIN I, HIGH SENSITIVITY RESULT: 37.5 NG/L — SIGNIFICANT CHANGE UP
UROBILINOGEN FLD QL: 0.2 MG/DL — SIGNIFICANT CHANGE UP (ref 0.2–1)
UROBILINOGEN FLD QL: 0.2 MG/DL — SIGNIFICANT CHANGE UP (ref 0.2–1)
WBC # BLD: 5.56 K/UL — SIGNIFICANT CHANGE UP (ref 3.8–10.5)
WBC # BLD: 6.46 K/UL — SIGNIFICANT CHANGE UP (ref 3.8–10.5)
WBC # BLD: 6.58 K/UL — SIGNIFICANT CHANGE UP (ref 3.8–10.5)
WBC # FLD AUTO: 5.56 K/UL — SIGNIFICANT CHANGE UP (ref 3.8–10.5)
WBC # FLD AUTO: 6.46 K/UL — SIGNIFICANT CHANGE UP (ref 3.8–10.5)
WBC # FLD AUTO: 6.58 K/UL — SIGNIFICANT CHANGE UP (ref 3.8–10.5)
WBC UR QL: 29 /HPF — HIGH (ref 0–5)

## 2024-11-02 PROCEDURE — 70450 CT HEAD/BRAIN W/O DYE: CPT | Mod: 26,MC,77

## 2024-11-02 PROCEDURE — 72125 CT NECK SPINE W/O DYE: CPT | Mod: 26

## 2024-11-02 PROCEDURE — 99285 EMERGENCY DEPT VISIT HI MDM: CPT | Mod: GC

## 2024-11-02 PROCEDURE — 74176 CT ABD & PELVIS W/O CONTRAST: CPT | Mod: 26,MC

## 2024-11-02 PROCEDURE — 93010 ELECTROCARDIOGRAM REPORT: CPT

## 2024-11-02 PROCEDURE — 70450 CT HEAD/BRAIN W/O DYE: CPT | Mod: 26,77

## 2024-11-02 PROCEDURE — 99291 CRITICAL CARE FIRST HOUR: CPT

## 2024-11-02 PROCEDURE — 71045 X-RAY EXAM CHEST 1 VIEW: CPT | Mod: 26,77

## 2024-11-02 PROCEDURE — 93970 EXTREMITY STUDY: CPT | Mod: 26

## 2024-11-02 PROCEDURE — 61781 SCAN PROC CRANIAL INTRA: CPT

## 2024-11-02 PROCEDURE — 93971 EXTREMITY STUDY: CPT | Mod: 26,LT

## 2024-11-02 PROCEDURE — 71045 X-RAY EXAM CHEST 1 VIEW: CPT | Mod: 26

## 2024-11-02 PROCEDURE — 61312 CRNEC/CRNOT STTL XDRL/SDRL: CPT

## 2024-11-02 PROCEDURE — 70450 CT HEAD/BRAIN W/O DYE: CPT | Mod: 26

## 2024-11-02 PROCEDURE — 99222 1ST HOSP IP/OBS MODERATE 55: CPT | Mod: 57

## 2024-11-02 DEVICE — SURGIFLO MATRIX WITH THROMBIN KIT: Type: IMPLANTABLE DEVICE | Site: LEFT | Status: FUNCTIONAL

## 2024-11-02 DEVICE — SCREW UN3 AXS SELF DRILL 1.5X4MM: Type: IMPLANTABLE DEVICE | Site: LEFT | Status: FUNCTIONAL

## 2024-11-02 DEVICE — PLATE COVER BURRHOLE UN3 W/TAB 10MM: Type: IMPLANTABLE DEVICE | Site: LEFT | Status: FUNCTIONAL

## 2024-11-02 DEVICE — PLATE UN3 2 HOLE RIGID: Type: IMPLANTABLE DEVICE | Site: LEFT | Status: FUNCTIONAL

## 2024-11-02 DEVICE — SURGIFOAM PAD 8CM X 12.5CM X 10MM (100): Type: IMPLANTABLE DEVICE | Site: LEFT | Status: FUNCTIONAL

## 2024-11-02 DEVICE — KIT A-LINE 1LUM 20G X 12CM SAFE KIT: Type: IMPLANTABLE DEVICE | Site: LEFT | Status: FUNCTIONAL

## 2024-11-02 DEVICE — SURGICEL 2 X 14": Type: IMPLANTABLE DEVICE | Site: LEFT | Status: FUNCTIONAL

## 2024-11-02 RX ORDER — LEVETIRACETAM 500 MG/1
500 TABLET, FILM COATED ORAL EVERY 12 HOURS
Refills: 0 | Status: DISCONTINUED | OUTPATIENT
Start: 2024-11-02 | End: 2024-11-03

## 2024-11-02 RX ORDER — PHENYLEPHRINE HCL IN 0.9% NACL 20MG/250ML
0.1 PLASTIC BAG, INJECTION (ML) INTRAVENOUS
Qty: 40 | Refills: 0 | Status: DISCONTINUED | OUTPATIENT
Start: 2024-11-02 | End: 2024-11-02

## 2024-11-02 RX ORDER — CHLORHEXIDINE GLUCONATE 40 MG/ML
15 SOLUTION TOPICAL EVERY 12 HOURS
Refills: 0 | Status: DISCONTINUED | OUTPATIENT
Start: 2024-11-02 | End: 2024-11-02

## 2024-11-02 RX ORDER — ONDANSETRON HYDROCHLORIDE 2 MG/ML
4 INJECTION, SOLUTION INTRAMUSCULAR; INTRAVENOUS EVERY 6 HOURS
Refills: 0 | Status: DISCONTINUED | OUTPATIENT
Start: 2024-11-02 | End: 2024-11-02

## 2024-11-02 RX ORDER — CHOLESTEROL/SOYBEAN OIL/C/E 60-200-80
15 POWDER (GRAM) ORAL DAILY
Refills: 0 | Status: DISCONTINUED | OUTPATIENT
Start: 2024-11-02 | End: 2024-11-07

## 2024-11-02 RX ORDER — OXYCODONE HYDROCHLORIDE 30 MG/1
10 TABLET ORAL EVERY 4 HOURS
Refills: 0 | Status: DISCONTINUED | OUTPATIENT
Start: 2024-11-02 | End: 2024-11-04

## 2024-11-02 RX ORDER — CEFTRIAXONE SODIUM 10 G
VIAL (EA) INJECTION
Refills: 0 | Status: DISCONTINUED | OUTPATIENT
Start: 2024-11-02 | End: 2024-11-02

## 2024-11-02 RX ORDER — NICARDIPINE HCL 30 MG
5 CAPSULE, EXTENDED RELEASE ORAL
Qty: 40 | Refills: 0 | Status: DISCONTINUED | OUTPATIENT
Start: 2024-11-02 | End: 2024-11-02

## 2024-11-02 RX ORDER — POTASSIUM PHOSPHATE 236; 224 MG/ML; MG/ML
15 INJECTION, SOLUTION INTRAVENOUS ONCE
Refills: 0 | Status: COMPLETED | OUTPATIENT
Start: 2024-11-02 | End: 2024-11-02

## 2024-11-02 RX ORDER — OXYCODONE HYDROCHLORIDE 30 MG/1
10 TABLET ORAL EVERY 4 HOURS
Refills: 0 | Status: DISCONTINUED | OUTPATIENT
Start: 2024-11-02 | End: 2024-11-02

## 2024-11-02 RX ORDER — SODIUM CHLORIDE 9 MG/ML
1000 INJECTION, SOLUTION INTRAMUSCULAR; INTRAVENOUS; SUBCUTANEOUS
Refills: 0 | Status: DISCONTINUED | OUTPATIENT
Start: 2024-11-02 | End: 2024-11-02

## 2024-11-02 RX ORDER — DESMOPRESSIN ACETATE 4 UG/ML
22 INJECTION INTRAVENOUS ONCE
Refills: 0 | Status: DISCONTINUED | OUTPATIENT
Start: 2024-11-02 | End: 2024-11-02

## 2024-11-02 RX ORDER — DESMOPRESSIN ACETATE 4 UG/ML
22 INJECTION INTRAVENOUS ONCE
Refills: 0 | Status: COMPLETED | OUTPATIENT
Start: 2024-11-02 | End: 2024-11-02

## 2024-11-02 RX ORDER — LEVETIRACETAM 500 MG/1
1000 TABLET, FILM COATED ORAL ONCE
Refills: 0 | Status: DISCONTINUED | OUTPATIENT
Start: 2024-11-02 | End: 2024-11-02

## 2024-11-02 RX ORDER — CHLORHEXIDINE GLUCONATE 40 MG/ML
1 SOLUTION TOPICAL DAILY
Refills: 0 | Status: DISCONTINUED | OUTPATIENT
Start: 2024-11-02 | End: 2024-11-05

## 2024-11-02 RX ORDER — CEFTRIAXONE SODIUM 10 G
2000 VIAL (EA) INJECTION ONCE
Refills: 0 | Status: COMPLETED | OUTPATIENT
Start: 2024-11-02 | End: 2024-11-02

## 2024-11-02 RX ORDER — PANTOPRAZOLE SODIUM 40 MG/1
40 TABLET, DELAYED RELEASE ORAL DAILY
Refills: 0 | Status: DISCONTINUED | OUTPATIENT
Start: 2024-11-02 | End: 2024-11-02

## 2024-11-02 RX ORDER — POTASSIUM CHLORIDE 10 MEQ
10 TABLET, EXTENDED RELEASE ORAL
Refills: 0 | Status: COMPLETED | OUTPATIENT
Start: 2024-11-02 | End: 2024-11-02

## 2024-11-02 RX ORDER — DEXMEDETOMIDINE HYDROCHLORIDE 400 UG/100ML
0.2 INJECTION, SOLUTION INTRAVENOUS
Qty: 200 | Refills: 0 | Status: DISCONTINUED | OUTPATIENT
Start: 2024-11-02 | End: 2024-11-02

## 2024-11-02 RX ORDER — OXYCODONE HYDROCHLORIDE 30 MG/1
5 TABLET ORAL EVERY 4 HOURS
Refills: 0 | Status: DISCONTINUED | OUTPATIENT
Start: 2024-11-02 | End: 2024-11-04

## 2024-11-02 RX ORDER — LEVETIRACETAM 500 MG/1
500 TABLET, FILM COATED ORAL EVERY 12 HOURS
Refills: 0 | Status: DISCONTINUED | OUTPATIENT
Start: 2024-11-02 | End: 2024-11-02

## 2024-11-02 RX ORDER — DEXMEDETOMIDINE HYDROCHLORIDE 400 UG/100ML
0.2 INJECTION, SOLUTION INTRAVENOUS
Qty: 400 | Refills: 0 | Status: DISCONTINUED | OUTPATIENT
Start: 2024-11-02 | End: 2024-11-02

## 2024-11-02 RX ORDER — DEXMEDETOMIDINE HYDROCHLORIDE 400 UG/100ML
0.2 INJECTION, SOLUTION INTRAVENOUS
Qty: 200 | Refills: 0 | Status: DISCONTINUED | OUTPATIENT
Start: 2024-11-02 | End: 2024-11-05

## 2024-11-02 RX ORDER — CEFTRIAXONE SODIUM 10 G
2000 VIAL (EA) INJECTION EVERY 24 HOURS
Refills: 0 | Status: DISCONTINUED | OUTPATIENT
Start: 2024-11-02 | End: 2024-11-03

## 2024-11-02 RX ORDER — ACETAMINOPHEN 500 MG
1000 TABLET ORAL ONCE
Refills: 0 | Status: COMPLETED | OUTPATIENT
Start: 2024-11-02 | End: 2024-11-03

## 2024-11-02 RX ORDER — CHLORHEXIDINE GLUCONATE 40 MG/ML
1 SOLUTION TOPICAL DAILY
Refills: 0 | Status: DISCONTINUED | OUTPATIENT
Start: 2024-11-02 | End: 2024-11-02

## 2024-11-02 RX ORDER — SODIUM CHLORIDE 9 MG/ML
500 INJECTION, SOLUTION INTRAMUSCULAR; INTRAVENOUS; SUBCUTANEOUS ONCE
Refills: 0 | Status: COMPLETED | OUTPATIENT
Start: 2024-11-02 | End: 2024-11-02

## 2024-11-02 RX ORDER — CEFTRIAXONE SODIUM 10 G
1000 VIAL (EA) INJECTION ONCE
Refills: 0 | Status: COMPLETED | OUTPATIENT
Start: 2024-11-02 | End: 2024-11-02

## 2024-11-02 RX ORDER — ONDANSETRON HYDROCHLORIDE 2 MG/ML
4 INJECTION, SOLUTION INTRAMUSCULAR; INTRAVENOUS EVERY 6 HOURS
Refills: 0 | Status: DISCONTINUED | OUTPATIENT
Start: 2024-11-02 | End: 2024-11-07

## 2024-11-02 RX ORDER — OXYCODONE HYDROCHLORIDE 30 MG/1
5 TABLET ORAL EVERY 4 HOURS
Refills: 0 | Status: DISCONTINUED | OUTPATIENT
Start: 2024-11-02 | End: 2024-11-02

## 2024-11-02 RX ORDER — ACETAMINOPHEN 500 MG
1000 TABLET ORAL EVERY 6 HOURS
Refills: 0 | Status: DISCONTINUED | OUTPATIENT
Start: 2024-11-02 | End: 2024-11-02

## 2024-11-02 RX ORDER — QUETIAPINE FUMARATE 200 MG/1
12.5 TABLET ORAL AT BEDTIME
Refills: 0 | Status: DISCONTINUED | OUTPATIENT
Start: 2024-11-02 | End: 2024-11-03

## 2024-11-02 RX ADMIN — DESMOPRESSIN ACETATE 111 MICROGRAM(S): 4 INJECTION INTRAVENOUS at 03:36

## 2024-11-02 RX ADMIN — Medication 100 MILLIEQUIVALENT(S): at 14:08

## 2024-11-02 RX ADMIN — POTASSIUM PHOSPHATE 62.5 MILLIMOLE(S): 236; 224 INJECTION, SOLUTION INTRAVENOUS at 14:08

## 2024-11-02 RX ADMIN — LEVETIRACETAM 500 MILLIGRAM(S): 500 TABLET, FILM COATED ORAL at 18:33

## 2024-11-02 RX ADMIN — Medication 100 MILLIEQUIVALENT(S): at 18:55

## 2024-11-02 RX ADMIN — Medication 2000 MILLIGRAM(S): at 13:30

## 2024-11-02 RX ADMIN — QUETIAPINE FUMARATE 12.5 MILLIGRAM(S): 200 TABLET ORAL at 22:33

## 2024-11-02 RX ADMIN — LEVETIRACETAM 500 MILLIGRAM(S): 500 TABLET, FILM COATED ORAL at 07:12

## 2024-11-02 RX ADMIN — Medication 100 MILLIGRAM(S): at 02:56

## 2024-11-02 RX ADMIN — CHLORHEXIDINE GLUCONATE 1 APPLICATION(S): 40 SOLUTION TOPICAL at 13:31

## 2024-11-02 RX ADMIN — Medication 100 MILLIEQUIVALENT(S): at 17:35

## 2024-11-02 RX ADMIN — PANTOPRAZOLE SODIUM 40 MILLIGRAM(S): 40 TABLET, DELAYED RELEASE ORAL at 13:30

## 2024-11-02 RX ADMIN — SODIUM CHLORIDE 500 MILLILITER(S): 9 INJECTION, SOLUTION INTRAMUSCULAR; INTRAVENOUS; SUBCUTANEOUS at 01:33

## 2024-11-02 RX ADMIN — LEVETIRACETAM 1000 MILLIGRAM(S): 500 TABLET, FILM COATED ORAL at 03:17

## 2024-11-02 NOTE — ED PROVIDER NOTE - NSREASONFORTRANSFER_ED_A_ED
Higher Level of Care or Service Not Available/Pre-existing Relationship/Patient Request/Consultant Request Higher Level of Care or Service Not Available

## 2024-11-02 NOTE — OCCUPATIONAL THERAPY INITIAL EVALUATION ADULT - ADDITIONAL COMMENTS
per EMR, pt from [facility] memory care unit, ambulatory at baseline and some assist with ADLs. Has RW and wheelchair.

## 2024-11-02 NOTE — ED PROVIDER NOTE - OUTSIDE ED RECORD SUMMARY
Reviewed results from [facility], CBC nonactionable CMP and nonactionable troponin I 37 proBNP 2500 UA large blood, moderate leukocyte esterase, 15 WBC and RBCs >100 CT renal stone has not showed interval placement of left ureteral stent in good position improved left hydroureteronephrosis and distal left ureteral lithiasis that is unchanged DVT study was negative     CT head showed a large mixed attenuation subdural hematoma on the left causing a rightward midline shift with additional smaller acute subdural hematoma overlying the inferior right frontal lobe

## 2024-11-02 NOTE — PROGRESS NOTE ADULT - ASSESSMENT
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 (1.2cm) transferred to [facility] for further management  #      NEURO:  -Neurochecks  Activity: [] mobilize as tolerated [] Bedrest [] PT [] OT [] PMNR    CV:  -SBP goal:  -MAP>65    PULM:  -on RA, maintain O2sat>92%  -Incentive Spirometer, encourage ambulation  -DuoNebs and 2% Nebulizers    RENAL:  -Fluids:    GI:  -Diet:  -GI prophylaxis: PPI daily  -Last BM:  -Bowel regimen [] colace [] senna [] other:    ENDO:   -Goal euglycemia (-180)    HEME/ONC:  VTE prophylaxis: heparin 5K BID, Heparin 5K TID, Lovenox 40 daily    ID:    MISC:    SOCIAL/FAMILY:  [] awaiting [] updated at bedside [] family meeting    CODE STATUS:  [] Full Code [] DNR [] DNI [] Palliative/Comfort Care    DISPOSITION:  [] ICU [] Stroke Unit [] Floor [] EMU [] DC Home    Contact: [phone] ASSESSMENT/PLAN: Pt is a 89yo M on ASA with history of dementia (baseline AOx2), HTN, PVD, recent hospitalization for hydroureteronephrosis s/p L ureteral stent and antibiotics presented to [facility] hospital with slurred speech and difficulty walking from baseline found to have a 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 Pt received DDAVP at [place] prior to transfer.  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 (1.2cm)      NEURO:  -pt taken to OR for emergent minicrani for L acute on chronic SDH with MLS  -s/p DDAVP at [facility] for ASA use  -ARU shows non-therapeutic  -1U Plt ordered per neurosurgery  -Neurochecks q1h  -further plan will be determined after surgical intervention    Activity: [] mobilize as tolerated [] Bedrest [] PT [] OT [] PMNR    CV:  -SBP goal:  -MAP>65    PULM:  -on RA, maintain O2sat>92%  -Incentive Spirometer    RENAL:  -Fluids:    GI:  -Diet: NPO, pending dysphagia screening post-surgery  -GI prophylaxis:   -Last BM: PTA  -Bowel regimen [] colace [] senna [] other:    ENDO:   -Goal euglycemia (-180)    HEME/ONC:  VTE prophylaxis: SCDs  LED 11/01/2024 at [place] negative  will repeat LED     ID:  UA at [place] consistent with infection  treated in 08/2024 for hydroureteronephrosis s/p stent placement and antibiotics  s/p ceftriaxone at [facility]  unknown if stent still in place  repeat UA and obtain urine culture  start ceftriaxone    MISC:  GOC - pt previous DNR/DNI which was rescinded for surgical intervention per daughter. Will re-address GOC with family post-surgery.     SOCIAL/FAMILY:  [] awaiting [] updated at bedside [] family meeting    CODE STATUS:  [x] Full Code [] DNR [] DNI [] Palliative/Comfort Care    DISPOSITION:  [x] ICU [] Stroke Unit [] Floor [] EMU [] DC Home    Contact: [phone]

## 2024-11-02 NOTE — PRE-ANESTHESIA EVALUATION ADULT - NSANTHPEFT_GEN_ALL_CORE
Agitated, does not follow commands Prednisone Counseling:  I discussed with the patient the risks of prolonged use of prednisone including but not limited to weight gain, insomnia, osteoporosis, mood changes, diabetes, susceptibility to infection, glaucoma and high blood pressure.  In cases where prednisone use is prolonged, patients should be monitored with blood pressure checks, serum glucose levels and an eye exam.  Additionally, the patient may need to be placed on GI prophylaxis, PCP prophylaxis, and calcium and vitamin D supplementation and/or a bisphosphonate.  The patient verbalized understanding of the proper use and the possible adverse effects of prednisone.  All of the patient's questions and concerns were addressed.

## 2024-11-02 NOTE — PROGRESS NOTE ADULT - SUBJECTIVE AND OBJECTIVE BOX
patient was extubated  placed on non rebreather, given pneumocephali   agitated, use precedex as needed  exam, agitated, not oriented to time or place, moving all 4 extremities at least antigravity, RADHA, intact EOM   once he passes swallow eval, can resume home paolaoyovannyl     [facility] attending

## 2024-11-02 NOTE — ED PROVIDER NOTE - NSRECPHYSICIAN_ED_A_ED_FT
Nipples intact but slightly sore.  Some mild redness noted.  Patient given hydrogels and lanolin.  Infant has good latch at breast.  Continue to monitor.     Dr. Green

## 2024-11-02 NOTE — OCCUPATIONAL THERAPY INITIAL EVALUATION ADULT - PHYSICAL ASSIST/NONPHYSICAL ASSIST, REHAB EVAL
[FreeTextEntry1] : \par Referring: Dr. Borjas\par \par 23 yo M with no significant medical history who presents for initial evaluation of palpitations. First episode was 2 mo ago while he was at work ([employer]) when he had sudden onset of palpitations with no associated symptoms. 2 weeks later he felt recurrent of palpitations that started at rest. [employer] mentioned he had HR of 175 bpm. He went to ER and was told that everything was fine. \par \par 2-3 weeks ago he had pins and needles in his hand and felt palpitations. He felt restless and couldn't sit still. 2-3 days ago he felt left lower rib cage pulsation. \par \par Episodes last 30-45 min. He states he gets a little lightheaded and has chest tightness with the episodes. He denies chest pain, presyncope or syncope. He denies any association of symptoms of with albuterol inhaler. He was drinking 2 cups of medium coffee from [employer] and stopped over a month ago but is unsure if that made a difference. \par 
verbal cues/1 person assist

## 2024-11-02 NOTE — OCCUPATIONAL THERAPY INITIAL EVALUATION ADULT - PERTINENT HX OF CURRENT PROBLEM, REHAB EVAL
89M on ASA81, hx dementia (Ox2 baseline), HTN, PVD, recent hospitalization August 2024 for UTI/pyelonephritis 2/2 L hydroureteronephrosis s/p emergent cystoscoy and left ureteral stent placement and treated w/ abx. Today, p/f AMS, speech slurring, and difficulty walking. CTH w/ chronic L SDH (~2.5cm w/ 1.2cm MLS) w/ small acute component; new since August 2024 CTH. Exam: EOs, PERRL, confused but Ox1, no facial, not fully cooperating in motor exam but BUE at least 4+/5, BLE at least 4/5. SILT. -pt taken to OR for emergent minicrani for L acute on chronic SDH with MLS. CT HEAD: 1.  Overall stable CT examination.2 Unchanged mixed attenuation left subdural hematoma with associated mass effect including 1.4 cm midline shift and effacement of the   ventricular system with concern for entrapment of the right lateral ventricle.3.  Unchanged right frontal inferior subdural hematoma measuring up to 0.4 cm.

## 2024-11-02 NOTE — BRIEF OPERATIVE NOTE - NSICDXBRIEFPROCEDURE_GEN_ALL_CORE_FT
PROCEDURES:  Craniotomy, supratentorial, for subdural hematoma evacuation 02-Nov-2024 10:48:56  Hossein Christensen

## 2024-11-02 NOTE — ED ADULT NURSE NOTE - NS PRO AD BILL OF RIGHTS
September 25, 2022     Patient: Bill Rinne   YOB: 1975   Date of Visit: 9/25/2022       To Whom it May Concern:    Villaparadise aPyne was seen in my clinic on 9/25/2022  She should be excused 9/26, 9/27, and 9/28/22  If you have any questions or concerns, please don't hesitate to call           Sincerely,          SAE Sarabia        CC: No Recipients No

## 2024-11-02 NOTE — ED PROVIDER NOTE - PHYSICAL EXAMINATION
GENERAL: well appearing in no acute distress  HEAD: normocephalic, atraumatic  HEENT: normal conjunctiva, oral mucosa moist, u  CARDIAC: regular rate and rhythm, no appreciable murmurs, 2+ pulses in UE/LE b/l  PULM: normal breath sounds, clear to ascultation bilaterally, no rales, rhonchi, wheezing  GI: abdomen nondistended, soft, nontender, no guarding, rebound tenderness  : no CVA tenderness b/l, no suprapubic tenderness  NEURO: no focal motor or sensory deficits, CN2-12 intact, slightly slurred speech, PERRLA, EOMI,, Alert, disoriented per baseline   MSK:+1 pitting edema b/l   SKIN: well-perfused, extremities warm, no visible rashes  PSYCH: appropriate mood and affect

## 2024-11-02 NOTE — ED PROVIDER NOTE - ATTENDING CONTRIBUTION TO CARE
I was the supervising attending. I have independently seen face-to-face and examined the patient with the resident. I have reviewed the history and physical and discussed the MDM with the resident. I agree with the assessment and plan as presented unless otherwise documented as follows:    89M hx dementia, HTN, on baby ASA, presenting as transfer from [facility] for SDH. On initial evaluation, patient is AOx1-2, not able to provide further history. Per daughter at bedside, at baseline AOx3 with mild memory deficits. Last seen at baseline on 10/30/24. Picked him up from assisted living yesterday for an outpatient appointment and noted he had difficulty ambulating and was slurring his words. Per AL staff at the time, no recent changes to health or known falls/trauma. Brought him back that evening, then was called later that night because he was found down and was more altered. Brought to [facility] and diagnosed with subacute vs. chronic SDH, possible small acute component, given DDAVP and Keppra. Here appears mildly sleepy but awakens easily to voice, AOx1-2, mildly dysarthric, following some commands, no respiratory distress/tolerating secretions. NSG consulted, aware of patient and to come to the ED. Will repeat labs, order for interval CTH. -Dayana Philip MD (Attending)

## 2024-11-02 NOTE — H&P ADULT - HISTORY OF PRESENT ILLNESS
89M on ASA81, hx dementia (Ox2 baseline), HTN, PVD, recent hospitalization August 2024 for UTI/pyelonephritis 2/2 L hydroureteronephrosis s/p emergent cystoscoy and left ureteral stent placement and treated w/ abx. Today, p/f AMS, speech slurring, and difficulty walking. CTH w/ chronic L SDH (~2.5cm w/ 1.2cm MLS) w/ small acute component; new since August 2024 CTH. Exam: EOs, PERRL, confused but Ox1, no facial, not fully cooperating in motor exam but BUE at least 4+/5, BLE at least 4/5. SILT

## 2024-11-02 NOTE — AIRWAY REMOVAL NOTE  ADULT & PEDS - ARTIFICAL AIRWAY REMOVAL COMMENTS
Written order for extubation verified. The patient was identified by full name and birth date compared to the identification band. Present during the procedure was IGOR Alejandre, and [facility]

## 2024-11-02 NOTE — PROGRESS NOTE ADULT - ASSESSMENT
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 Pt received DDAVP at [place] prior to transfer.  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 (1.2cm)      NEURO:  -s/p minicrani for L acute on chronic SDH with MLS  - subgealeal drain in place  - s/p DDAVP at [facility] for ASA use  - ARU shows non-therapeutic  -Neurochecks q1h  -further plan will be determined after surgical intervention    Activity: [] mobilize as tolerated [] Bedrest [] PT [] OT [] PMNR    CV:  -SBP goal: 100-140  -MAP>65    PULM:  -on non-rebreather for pneumocephalus, maintain O2sat>92%  -Incentive Spirometer    RENAL:  -Fluids:    GI:  -Diet: NPO, pending dysphagia screening post-surgery  -GI prophylaxis:   -Last BM: PTA  -Bowel regimen [] colace [] senna [] other:    ENDO:   -Goal euglycemia (-180)    HEME/ONC:  VTE prophylaxis: SCDs  LED 11/01/2024 at [place] negative  will repeat LED     ID:  UA at [place] consistent with infection  treated in 08/2024 for hydroureteronephrosis s/p stent placement and antibiotics  s/p ceftriaxone at [facility]  unknown if stent still in place  repeat UA and obtain urine culture  C/W ceftriaxone for UTI    MISC:  GOC - pt previous DNR/DNI which was rescinded for surgical intervention per daughter. Will re-address GOC with family post-surgery.     SOCIAL/FAMILY:  [] awaiting [] updated at bedside [] family meeting    CODE STATUS:  [x] Full Code [] DNR [] DNI [] Palliative/Comfort Care    DISPOSITION:  [x] ICU [] Stroke Unit [] Floor [] EMU [] DC Home    Contact: [phone]

## 2024-11-02 NOTE — ED PROVIDER NOTE - CLINICAL SUMMARY MEDICAL DECISION MAKING FREE TEXT BOX
[facility] MRN [id number]    89-year-old male presenting for evaluation of subdural hemorrhage, and UTI, plan to consult neurosurgery order repeat labs since patient is on aspirin will obtain platelet response to aspirin lab tests.  Patient to be admitted [facility] MRN [id number]    89-year-old male presenting for evaluation of subdural hemorrhage from [facility], plan to consult neurosurgery order repeat labs since patient is on aspirin will obtain platelet response to aspirin lab tests.  Patient to be admitted. Pt's UA has WBC however given hematuria from stent likely not an infection likel WBC from stent placement.

## 2024-11-02 NOTE — ED PROVIDER NOTE - NSCAREINITIATED _GEN_ER
Post Anesthesia Care Unit [address] [address]    Phone:  [phone]                                       After Visit Summary   8/12/2017    Freddy Lange    MRN: [id number]           After Visit Summary Signature Page     I have received my discharge instructions, and my questions have been answered. I have discussed any challenges I see with this plan with the nurse or doctor.    ..........................................................................................................................................  Patient/Patient Representative Signature      ..........................................................................................................................................  Patient Representative Print Name and Relationship to Patient    ..................................................               ................................................  Date                                            Time    ..........................................................................................................................................  Reviewed by Signature/Title    ...................................................              ..............................................  Date                                                            Time           Dayana Philip(Attending)

## 2024-11-02 NOTE — ED PROVIDER NOTE - PROGRESS NOTE DETAILS
Cheryl Allison MD, PGY3: discussed case with neurosurgery will come see interval CT head ordered for stability Cheryl Allison MD, PGY3:  pt to be admitted to Neurosurgical icu

## 2024-11-02 NOTE — ED PROVIDER NOTE - OBJECTIVE STATEMENT
89 y/o  Past medical history of dementia presenting for evaluation as a transfer from [place] for subdural hemorrhage.  Patient also noted to have UTI.  Per daughter she saw the patient on the 30th, patient was normal.  Yesterday patient was slurring his words and had generalized weakness, she took the patient to an appointment and dropped him back at his assisted living facility, patient was then found down at 11 PM.  At [place] patient was found to have subdural hemorrhage, UTI, patient is on aspirin and was given keppra, DDAVP and ceftriaxone and transferred here for further management.    pt has no complaints at this time     [facility] MRN [id number]

## 2024-11-02 NOTE — ED ADULT NURSE NOTE - NSFALLFACTORS_ED_ALL_ED
Hyponatremia/ Hyperkalemia==> metastatic gyn Confusion/AMS

## 2024-11-02 NOTE — ED PROVIDER NOTE - CARE PLAN
Principal Discharge DX:	Subdural hemorrhage  Secondary Diagnosis:	Acute UTI   1 Principal Discharge DX:	Subdural hemorrhage

## 2024-11-02 NOTE — PROGRESS NOTE ADULT - SUBJECTIVE AND OBJECTIVE BOX
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 (1.2cm) transferred to [facility] for further management    OVERNIGHT EVENTS:     HOSPITAL COURSE  11/02/2024: L minicraniotomy for SDH evacuation planned    ADMISSION SCORES:   GCS: HH: MF: NIHSS: ICH Score:    ----------------------------------------------------------------------------------------------------  PHYSICAL EXAM:  General:   HEENT:   Neuro:  -Mental status- confused alert, oriented to self but does not know where he is or the year, intermittently following commands  -CN- PERRL 3mm, face symmetric  -SensoriMotor- Bilateral UE: 4/5, Bilateral LE 4+5    CV: regular rate  Pulm: no accessory muscle use  Abd: soft, nontender, nondistended, []TF  Skin: warm, dry, [] incontinence associated skin breakdown    -----------------------------------------------------------------------------------------------------  VITALS:   T(C): 36.7 (11-02-24 @ 04:20), Max: 36.7 (11-02-24 @ 04:20)  HR: 88 (11-02-24 @ 06:25) (75 - 88)  BP: 130/90 (11-02-24 @ 06:25) (115/67 - 130/90)  RR: 20 (11-02-24 @ 06:25) (16 - 20)  SpO2: 95% (11-02-24 @ 06:25) (94% - 95%)      MEDICATIONS  (STANDING):  chlorhexidine 4% Liquid 1 Application(s) Topical daily  dexMEDEtomidine Infusion 0.2 MICROgram(s)/kG/Hr (3.74 mL/Hr) IV Continuous <Continuous>  levETIRAcetam   Injectable 500 milliGRAM(s) IV Push every 12 hours  pantoprazole  Injectable 40 milliGRAM(s) IV Push daily    RESPIRATORY:    IMAGING:   Recent imaging studies were reviewed.    LAB RESULTS:                          8.8    6.46  )-----------( 199      ( 02 Nov 2024 04:51 )             28.1       PT/INR - ( 02 Nov 2024 04:51 )   PT: 12.7 sec;   INR: 1.11 ratio         PTT - ( 02 Nov 2024 04:51 )  PTT:26.4 sec    11-02    142  |  110[H]  |  30[H]  ----------------------------<  95  4.1   |  25  |  0.96    Ca    8.0[L]      02 Nov 2024 04:52    TPro  5.6[L]  /  Alb  3.2[L]  /  TBili  0.3  /  DBili  x   /  AST  22  /  ALT  15  /  AlkPhos  75  11-02          -----------------------------------------------------------------------------------------------------------------------------------------------------------------------------------    ALLERGIES: Allergies    No Known Allergies    Intolerances

## 2024-11-02 NOTE — ED ADULT NURSE NOTE - CHIEF COMPLAINT QUOTE
Pt sent from [facility], as per staff pt not acting himself x 2 days, Hx Alzheimer's, as per EMS Pt  A&Ox4.

## 2024-11-02 NOTE — ED PROVIDER NOTE - PROGRESS NOTE DETAILS
CT shows 25mm L SDH with 14mm shift. Pt is not on AC/AP. Airway still intact, is close to his baseline mental status. Daughter made aware. Spoke to transfer center who accepted tier 1 to [facility] ED for nsgy eval Received call from radiology that pt has B/L SDH's on CT, larger on L. CT shows 25mm L SDH with 14mm shift. Pt is not on aspirin. Airway still intact, pt is close to his baseline mental status so will hold off on any intubations. Daughter made aware. Spoke to transfer center who accepted tier 1 to [facility] ED for nsgy eval. pt given DDAVP and keppra prior to transfer. pt also treated for UTI with CTX Received call from radiology that pt has B/L SDH's on CT, larger on L. CT shows 25mm L SDH with 14mm shift. Pt is not on aspirin. Airway still intact, pt is close to his baseline mental status so will hold off on any intubations. Daughter made aware. Spoke to neuro icu fellow via transfer center who accepted tier 1 to [facility] ED for nsgy eval. pt given DDAVP and keppra prior to transfer. pt also treated for UTI with CTX

## 2024-11-02 NOTE — H&P ADULT - ATTENDING COMMENTS
89M on ASA81, hx dementia (Ox2 baseline), HTN, PVD, recent hospitalization August 2024 for UTI/pyelonephritis 2/2 L hydroureteronephrosis s/p emergent cystoscoy and left ureteral stent placement and treated w/ abx. Today, p/f AMS, speech slurring, and difficulty walking. CTH w/ chronic L SDH (~2.5cm w/ 1.2cm MLS) w/ small acute component; new since August 2024 CTH. Exam: EOs, PERRL, confused but Ox1, no facial, not fully cooperating in motor exam but BUE at least 4+/5, BLE at least 4/5. SILT    Left craniotomy for evacuation of subdural hematoma. Risks and benefits of surgery were discussed, and family wish to proceed.

## 2024-11-02 NOTE — CHART NOTE - NSCHARTNOTEFT_GEN_A_CORE
CAPRINI SCORE [CLOT] Score on Admission for     AGE RELATED RISK FACTORS                                                       MOBILITY RELATED FACTORS  [ ] Age 41-60 years                                            (1 Point)                  [ ] Bed rest                                                        (1 Point)  [ ] Age: 61-74 years                                           (2 Points)                 [ ] Plaster cast                                                   (2 Points)  [x] Age= 75 years                                              (3 Points)                 [ ] Bed bound for more than 72 hours                 (2 Points)    DISEASE RELATED RISK FACTORS                                               GENDER SPECIFIC FACTORS  [ ] Edema in the lower extremities                       (1 Point)                  [ ] Pregnancy                                                     (1 Point)  [ ] Varicose veins                                               (1 Point)                  [ ] Post-partum < 6 weeks                                   (1 Point)             [ ] BMI > 25 Kg/m2                                            (1 Point)                  [ ] Hormonal therapy  or oral contraception          (1 Point)                 [ ] Sepsis (in the previous month)                        (1 Point)            [ ] History of pregnancy complications                 (1 point)  [ ] Pneumonia or serious lung disease                                            [ ] Unexplained or recurrent                     (1 Point)           (in the previous month)                               (1 Point)  [ ] Abnormal pulmonary function test                     (1 Point)                 SURGERY RELATED RISK FACTORS (include planned surgeries)  [ ] Acute myocardial infarction                              (1 Point)                 [ ]  Section                                             (1 Point)  [ ] Congestive heart failure (in the previous month)  (1 Point)         [ ] Minor surgery                                                  (1 Point)   [ ] Inflammatory bowel disease                             (1 Point)                 [ ] Arthroscopic surgery                                        (2 Points)  [ ] Central venous access                                      (2 Points)                 [ ] General surgery lasting more than 45 minutes   (2 Points)       [] Stroke (in the previous month)                          (5 Points)               [ ] Elective arthroplasty                                         (5 Points)            [ ] current or past malignancy                              (2 Points)                                                                                                       HEMATOLOGY RELATED FACTORS                                                 TRAUMA RELATED RISK FACTORS  [ ] Prior episodes of VTE                                     (3 Points)                [ ] Fracture of the hip, pelvis, or leg                       (5 Points)  [ ] Positive family history for VTE                         (3 Points)             [ ] Acute spinal cord injury (in the previous month)  (5 Points)  [ ] Prothrombin 99442 A                                     (3 Points)                [ ] Paralysis  (less than 1 month)                             (5 Points)  [ ] Factor V Leiden                                             (3 Points)                  [ ] Multiple Trauma within 1 month                        (5 Points)  [ ] Lupus anticoagulants                                     (3 Points)                                                           [ ] Anticardiolipin antibodies                               (3 Points)                                                       [ ] High homocysteine in the blood                      (3 Points)                                             [ ] Other congenital or acquired thrombophilia      (3 Points)                                                [ ] Heparin induced thrombocytopenia                  (3 Points)                                          Total Score [     3     ]    Risk:  Very low 0   Low 1 to 2   Moderate 3 to 4   High =5       VTE Prophylaxis Recommendations:  [x] mechanical pneumatic compression devices                                      [ ] contraindicated: _____________________  [ ] chemo prophylaxis                                                                                  [x] contraindicated _____________________    **** HIGH LIKELIHOOD DVT PRESENT ON ADMISSION  [x] (please order LE dopplers within 24 hours of admission)

## 2024-11-02 NOTE — ED ADULT NURSE NOTE - NSFALLCONCLUSION_ED_ALL_ED
Caller: Lorenzo Rooney    Relationship to patient: Self    Best call back number: [phone]    Patient is needing: PATIENT MISSED A CALL FROM THE OFFICE TO RESCHEDULE HER SCOPE. SHE WOULD LIKE A CALL BACK TO DO THAT PLEASE.          
Caller: Lorenzo Rooney    Relationship to patient: Self    Best call back number: [phone]    PATIENT NEEDS TO RESCHEDULE HER April COLONOSCOPY AND EGD. PLEASE ALL PATIENT TO RESCHEDULE.   
Msg and IM sent to scheduling.   
Universal Safety Interventions

## 2024-11-02 NOTE — ED PROVIDER NOTE - OBJECTIVE STATEMENT
89M PMH dementia, UTI [facility]EMS [facility]. Pt was found on the floor erratic and there was concern for UTI. Dr. Rodríguez did ?lithotripsy 2 weeks ago outpatient for a kidney stone 89M PMH dementia, UTI BIBEMS from [facility] for AMS. Pt was found on the floor erratic and there was concern for UTI. Urology Dr. Rodríguez did ?lithotripsy 2 weeks ago outpatient for a kidney stone

## 2024-11-02 NOTE — ED ADULT TRIAGE NOTE - CHIEF COMPLAINT QUOTE
Pt sent from [facility], as per staff pt not acting himself x 2 days, Hx Alzheimer's, as per EMS Pt  A&Ox4.

## 2024-11-02 NOTE — PROGRESS NOTE ADULT - SUBJECTIVE AND OBJECTIVE BOX
patient came back from OR intubated  Craniotomy, supratentorial, for subdural hematoma evacuation, subgealeal drain   CT head post-op with expected post-op changes   he was on propofol 50, RADHA, eyes midline 0/5 all over, not overbreathing   will re-assess in 30 minutes     Kelsy Lewis NSCU attenting

## 2024-11-02 NOTE — OCCUPATIONAL THERAPY INITIAL EVALUATION ADULT - ADL RETRAINING, OT EVAL
GOAL: pt will require min A for toileting tasks within 4 weeks to increase engagement in functional tasks

## 2024-11-02 NOTE — H&P ADULT - ASSESSMENT
89M on ASA81, hx dementia (Ox2 baseline), HTN, PVD, recent hospitalization August 2024 for UTI/pyelonephritis 2/2 L hydroureteronephrosis s/p emergent cystoscoy and left ureteral stent placement and treated w/ abx. Today, p/f AMS, speech slurring, and difficulty walking. CTH w/ chronic L SDH (~2.5cm w/ 1.2cm MLS) w/ small acute component; new since August 2024 CTH. Exam: EOs, PERRL, confused but Ox1, no facial, not fully cooperating in motor exam but BUE at least 4+/5, BLE at least 4/5. SILT  - adm NSCU under Dr. Godfrey  - no AC/AP  - ARU  - 4hr repeat scan (6:15am)  - keppra 1000 BID  - ddavp given at [facility]  - added on for L minicraniotomy for SDH evac today

## 2024-11-02 NOTE — ED PROVIDER NOTE - CLINICAL SUMMARY MEDICAL DECISION MAKING FREE TEXT BOX
89M p/w AMS. VS and exam as above. ECG nondiagnostic  DDx includes but not limited to: at this time Differential remains broad. Will eval for UTI, heather, lyte derangements, dehydration, acs  Plan: CT's, cxr, labs, ua/ucx, reassess symptoms    See Progress Notes for further updates on ED Course 89M p/w AMS. VS and exam as above. ECG nondiagnostic  DDx includes but not limited to: at this time Differential remains broad. Will eval for intracranial injury, UTI, heather, lyte derangements, dehydration, acs  Plan: CT's, cxr, labs, ua/ucx, reassess symptoms    See Progress Notes for further updates on ED Course

## 2024-11-02 NOTE — ED ADULT NURSE NOTE - OBJECTIVE STATEMENT
pt is 89y M, pmhx HTN, dementia A&Ox2 person place baseline, chronic UTI, BIBEMS transfer from [facility] for unwitnessed fall d/x with bilateral subdural hematoma, pt given keppra IVPB, rocephin IVPB for UTI, and desmopressin, pt from [facility], unwitnessed fall, found down by staff, pt A&Ox2 at [facility], pt full ROM, neuro intact, daughter at bedside, pt takes 81mg aspirin daily, pt placed on cardiac monitor-NSR, pt ambulates with walker at home, pt updated on plan of care, DNR/DNI MOLST form in chart

## 2024-11-02 NOTE — PRE-ANESTHESIA EVALUATION ADULT - NSANTHASARD_GEN_ALL_CORE
3E No. KELSIE screening performed.  STOP BANG Legend: 0-2 = LOW Risk; 3-4 = INTERMEDIATE Risk; 5-8 = HIGH Risk

## 2024-11-02 NOTE — ED PROVIDER NOTE - PHYSICAL EXAMINATION
Gen: awake alert   Head: NCAT  HEENT: normal conjunctiva, oral mucosa moist  Lung: CTAB, no respiratory distress, no wheezes/rhonchi/rales B/L, speaking in full sentences  CV: RRR  Abd: soft, NT, ND, no guarding, no rigidity, no rebound tenderness, L inguinal hernia NT soft and easily reducible w/o any overlying skin changes  MSK: no visible deformities  Neuro: No focal sensory or motor deficits, no facial droop  Skin: Warm, well perfused, trace pitting edema of LLE, 2+ DP pulses B/L Gen: awake alert   Head: NCAT  HEENT: normal conjunctiva, oral mucosa moist  Lung: CTAB, no respiratory distress, no wheezes/rhonchi/rales B/L, speaking in full sentences  CV: RRR  Abd: soft, NT, ND, no guarding, no rigidity, no rebound tenderness, L inguinal hernia NT soft and easily reducible w/o any overlying skin changes  MSK: no visible deformities  Neuro: No drift of extremities, normal sensation, no facial droop  Skin: Warm, well perfused, trace pitting edema of LLE, 2+ DP pulses B/L

## 2024-11-02 NOTE — ED ADULT NURSE NOTE - NSFALLHARMRISKINTERV_ED_ALL_ED

## 2024-11-02 NOTE — ED ADULT NURSE NOTE - NSFALLUNIVINTERV_ED_ALL_ED
Bed/Stretcher in lowest position, wheels locked, appropriate side rails in place/Call bell, personal items and telephone in reach/Instruct patient to call for assistance before getting out of bed/chair/stretcher/Non-slip footwear applied when patient is off stretcher/[place] to call system/Physically safe environment - no spills, clutter or unnecessary equipment/Purposeful proactive rounding/Room/bathroom lighting operational, light cord in reach

## 2024-11-02 NOTE — PROGRESS NOTE ADULT - CRITICAL CARE ATTENDING COMMENT
large left chronic subacute acute subdural hematoma with midline shift and brain compression, agitated, precedex, neuro exam confused agitated , refusing to be examined, RADHA , intact EOM, at least antigravity in all 4. neuro checks q 1 hr, will go to OR urgently, keppra 500 mg BID for seizure prophylaxis , CT cervical spine was not done , no neck tenderness upon arrival to the ED , precedex as needed for agitation,   RA, NPO, NS 75 ml/hr, at home on seroquel 25 mg once daily and trazodone, will resume post-op , ( was on aspirin 81 mg daily at home) , history of UTI/pyelonephritis 2/2 L hydroureteronephrosis s/p emergent cystoscoy and left ureteral stent placement and treated w/ abx treated in aug 2024. CT abd pelvis improved hydronephrosis , send UA and urine cx,  UA was positive at [facility], UA culture from august  E fecalus sensitive to ceftriaxone, will continue ceftriaxone,  SBP goal >110 mmhg, will get TTE, ECG RBBB , ECG qtc 471 , trop neg at [facility],  hold chemoprophylaxis going to OR     patient at risk for brain herniation, for hypoerfusing brain keep SBP> 110 avoid hypotension, will need to go to OR

## 2024-11-02 NOTE — H&P ADULT - NSHPPHYSICALEXAM_GEN_ALL_CORE
EO spon, PERRL, confused but Ox1, no facial, not fully cooperating in motor exam but BUE at least 4+/5, BLE at least 4/5. SILT

## 2024-11-03 LAB
ANION GAP SERPL CALC-SCNC: 10 MMOL/L — SIGNIFICANT CHANGE UP (ref 5–17)
ANION GAP SERPL CALC-SCNC: 11 MMOL/L — SIGNIFICANT CHANGE UP (ref 5–17)
BUN SERPL-MCNC: 27 MG/DL — HIGH (ref 7–23)
BUN SERPL-MCNC: 30 MG/DL — HIGH (ref 7–23)
CALCIUM SERPL-MCNC: 7.3 MG/DL — LOW (ref 8.4–10.5)
CALCIUM SERPL-MCNC: 7.5 MG/DL — LOW (ref 8.4–10.5)
CHLORIDE SERPL-SCNC: 109 MMOL/L — HIGH (ref 96–108)
CHLORIDE SERPL-SCNC: 110 MMOL/L — HIGH (ref 96–108)
CO2 SERPL-SCNC: 21 MMOL/L — LOW (ref 22–31)
CO2 SERPL-SCNC: 22 MMOL/L — SIGNIFICANT CHANGE UP (ref 22–31)
CREAT SERPL-MCNC: 0.85 MG/DL — SIGNIFICANT CHANGE UP (ref 0.5–1.3)
CREAT SERPL-MCNC: 0.95 MG/DL — SIGNIFICANT CHANGE UP (ref 0.5–1.3)
EGFR: 72 ML/MIN/1.73M2 — SIGNIFICANT CHANGE UP
EGFR: 79 ML/MIN/1.73M2 — SIGNIFICANT CHANGE UP
GLUCOSE SERPL-MCNC: 108 MG/DL — HIGH (ref 70–99)
GLUCOSE SERPL-MCNC: 97 MG/DL — SIGNIFICANT CHANGE UP (ref 70–99)
HCT VFR BLD CALC: 26.3 % — LOW (ref 39–50)
HGB BLD-MCNC: 8.1 G/DL — LOW (ref 13–17)
MAGNESIUM SERPL-MCNC: 1.8 MG/DL — SIGNIFICANT CHANGE UP (ref 1.6–2.6)
MAGNESIUM SERPL-MCNC: 2.1 MG/DL — SIGNIFICANT CHANGE UP (ref 1.6–2.6)
MCHC RBC-ENTMCNC: 30.8 G/DL — LOW (ref 32–36)
MCHC RBC-ENTMCNC: 30.8 PG — SIGNIFICANT CHANGE UP (ref 27–34)
MCV RBC AUTO: 100 FL — SIGNIFICANT CHANGE UP (ref 80–100)
MRSA PCR RESULT.: SIGNIFICANT CHANGE UP
NRBC # BLD: 0 /100 WBCS — SIGNIFICANT CHANGE UP (ref 0–0)
PHOSPHATE SERPL-MCNC: 5 MG/DL — HIGH (ref 2.5–4.5)
PHOSPHATE SERPL-MCNC: 7.1 MG/DL — HIGH (ref 2.5–4.5)
PLATELET # BLD AUTO: 178 K/UL — SIGNIFICANT CHANGE UP (ref 150–400)
POTASSIUM SERPL-MCNC: 4.5 MMOL/L — SIGNIFICANT CHANGE UP (ref 3.5–5.3)
POTASSIUM SERPL-MCNC: 4.7 MMOL/L — SIGNIFICANT CHANGE UP (ref 3.5–5.3)
POTASSIUM SERPL-SCNC: 4.5 MMOL/L — SIGNIFICANT CHANGE UP (ref 3.5–5.3)
POTASSIUM SERPL-SCNC: 4.7 MMOL/L — SIGNIFICANT CHANGE UP (ref 3.5–5.3)
RBC # BLD: 2.63 M/UL — LOW (ref 4.2–5.8)
RBC # FLD: 15.9 % — HIGH (ref 10.3–14.5)
S AUREUS DNA NOSE QL NAA+PROBE: SIGNIFICANT CHANGE UP
SODIUM SERPL-SCNC: 141 MMOL/L — SIGNIFICANT CHANGE UP (ref 135–145)
SODIUM SERPL-SCNC: 142 MMOL/L — SIGNIFICANT CHANGE UP (ref 135–145)
WBC # BLD: 6.61 K/UL — SIGNIFICANT CHANGE UP (ref 3.8–10.5)
WBC # FLD AUTO: 6.61 K/UL — SIGNIFICANT CHANGE UP (ref 3.8–10.5)

## 2024-11-03 PROCEDURE — 93010 ELECTROCARDIOGRAM REPORT: CPT

## 2024-11-03 PROCEDURE — 70450 CT HEAD/BRAIN W/O DYE: CPT | Mod: 26

## 2024-11-03 PROCEDURE — 93306 TTE W/DOPPLER COMPLETE: CPT | Mod: 26

## 2024-11-03 PROCEDURE — 71045 X-RAY EXAM CHEST 1 VIEW: CPT | Mod: 26

## 2024-11-03 PROCEDURE — 99233 SBSQ HOSP IP/OBS HIGH 50: CPT

## 2024-11-03 RX ORDER — QUETIAPINE FUMARATE 200 MG/1
25 TABLET ORAL ONCE
Refills: 0 | Status: COMPLETED | OUTPATIENT
Start: 2024-11-03 | End: 2024-11-03

## 2024-11-03 RX ORDER — QUETIAPINE FUMARATE 200 MG/1
50 TABLET ORAL EVERY 8 HOURS
Refills: 0 | Status: DISCONTINUED | OUTPATIENT
Start: 2024-11-03 | End: 2024-11-04

## 2024-11-03 RX ORDER — HALOPERIDOL DECANOATE 50 MG/ML
2 INJECTION INTRAMUSCULAR ONCE
Refills: 0 | Status: COMPLETED | OUTPATIENT
Start: 2024-11-03 | End: 2024-11-03

## 2024-11-03 RX ORDER — POLYETHYLENE GLYCOL 3350 17 G/17G
17 POWDER, FOR SOLUTION ORAL DAILY
Refills: 0 | Status: DISCONTINUED | OUTPATIENT
Start: 2024-11-03 | End: 2024-11-07

## 2024-11-03 RX ORDER — ACETAMINOPHEN 500 MG
1000 TABLET ORAL ONCE
Refills: 0 | Status: COMPLETED | OUTPATIENT
Start: 2024-11-03 | End: 2024-11-03

## 2024-11-03 RX ORDER — VALPROIC ACID 250 MG/5ML
250 SOLUTION ORAL EVERY 8 HOURS
Refills: 0 | Status: DISCONTINUED | OUTPATIENT
Start: 2024-11-03 | End: 2024-11-05

## 2024-11-03 RX ORDER — FUROSEMIDE 40 MG
10 TABLET ORAL ONCE
Refills: 0 | Status: COMPLETED | OUTPATIENT
Start: 2024-11-03 | End: 2024-11-03

## 2024-11-03 RX ORDER — HEPARIN SODIUM 10000 [USP'U]/ML
5000 INJECTION INTRAVENOUS; SUBCUTANEOUS EVERY 8 HOURS
Refills: 0 | Status: DISCONTINUED | OUTPATIENT
Start: 2024-11-03 | End: 2024-11-04

## 2024-11-03 RX ORDER — SENNA 187 MG
2 TABLET ORAL AT BEDTIME
Refills: 0 | Status: DISCONTINUED | OUTPATIENT
Start: 2024-11-03 | End: 2024-11-07

## 2024-11-03 RX ORDER — PIPERACILLIN AND TAZOBACTAM .5; 4 G/20ML; G/20ML
3.38 INJECTION, POWDER, LYOPHILIZED, FOR SOLUTION INTRAVENOUS ONCE
Refills: 0 | Status: COMPLETED | OUTPATIENT
Start: 2024-11-03 | End: 2024-11-03

## 2024-11-03 RX ORDER — THIAMINE HCL 100 MG
100 TABLET ORAL DAILY
Refills: 0 | Status: COMPLETED | OUTPATIENT
Start: 2024-11-03 | End: 2024-11-05

## 2024-11-03 RX ORDER — QUETIAPINE FUMARATE 200 MG/1
25 TABLET ORAL DAILY
Refills: 0 | Status: DISCONTINUED | OUTPATIENT
Start: 2024-11-03 | End: 2024-11-03

## 2024-11-03 RX ORDER — QUETIAPINE FUMARATE 200 MG/1
12.5 TABLET ORAL ONCE
Refills: 0 | Status: DISCONTINUED | OUTPATIENT
Start: 2024-11-03 | End: 2024-11-03

## 2024-11-03 RX ORDER — DOXAZOSIN MESYLATE 8 MG
2 TABLET ORAL AT BEDTIME
Refills: 0 | Status: DISCONTINUED | OUTPATIENT
Start: 2024-11-03 | End: 2024-11-06

## 2024-11-03 RX ORDER — HYDRALAZINE HYDROCHLORIDE 50 MG/1
5 TABLET, FILM COATED ORAL ONCE
Refills: 0 | Status: COMPLETED | OUTPATIENT
Start: 2024-11-03 | End: 2024-11-03

## 2024-11-03 RX ORDER — PIPERACILLIN AND TAZOBACTAM .5; 4 G/20ML; G/20ML
3.38 INJECTION, POWDER, LYOPHILIZED, FOR SOLUTION INTRAVENOUS EVERY 8 HOURS
Refills: 0 | Status: DISCONTINUED | OUTPATIENT
Start: 2024-11-03 | End: 2024-11-04

## 2024-11-03 RX ORDER — HALOPERIDOL DECANOATE 50 MG/ML
2 INJECTION INTRAMUSCULAR ONCE
Refills: 0 | Status: DISCONTINUED | OUTPATIENT
Start: 2024-11-03 | End: 2024-11-03

## 2024-11-03 RX ORDER — TRAZODONE HYDROCHLORIDE 100 MG/1
100 TABLET ORAL AT BEDTIME
Refills: 0 | Status: DISCONTINUED | OUTPATIENT
Start: 2024-11-03 | End: 2024-11-04

## 2024-11-03 RX ADMIN — Medication 100 MILLIGRAM(S): at 11:39

## 2024-11-03 RX ADMIN — DEXMEDETOMIDINE HYDROCHLORIDE 3.74 MICROGRAM(S)/KG/HR: 400 INJECTION, SOLUTION INTRAVENOUS at 10:36

## 2024-11-03 RX ADMIN — Medication 400 MILLIGRAM(S): at 11:00

## 2024-11-03 RX ADMIN — QUETIAPINE FUMARATE 50 MILLIGRAM(S): 200 TABLET ORAL at 22:51

## 2024-11-03 RX ADMIN — DEXMEDETOMIDINE HYDROCHLORIDE 3.74 MICROGRAM(S)/KG/HR: 400 INJECTION, SOLUTION INTRAVENOUS at 21:00

## 2024-11-03 RX ADMIN — Medication 1000 MILLIGRAM(S): at 11:30

## 2024-11-03 RX ADMIN — VALPROIC ACID 52.5 MILLIGRAM(S): 250 SOLUTION ORAL at 21:23

## 2024-11-03 RX ADMIN — Medication 400 MILLIGRAM(S): at 02:23

## 2024-11-03 RX ADMIN — LEVETIRACETAM 500 MILLIGRAM(S): 500 TABLET, FILM COATED ORAL at 05:15

## 2024-11-03 RX ADMIN — DEXMEDETOMIDINE HYDROCHLORIDE 3.74 MICROGRAM(S)/KG/HR: 400 INJECTION, SOLUTION INTRAVENOUS at 05:15

## 2024-11-03 RX ADMIN — QUETIAPINE FUMARATE 25 MILLIGRAM(S): 200 TABLET ORAL at 11:39

## 2024-11-03 RX ADMIN — QUETIAPINE FUMARATE 25 MILLIGRAM(S): 200 TABLET ORAL at 16:55

## 2024-11-03 RX ADMIN — POLYETHYLENE GLYCOL 3350 17 GRAM(S): 17 POWDER, FOR SOLUTION ORAL at 18:49

## 2024-11-03 RX ADMIN — HEPARIN SODIUM 5000 UNIT(S): 10000 INJECTION INTRAVENOUS; SUBCUTANEOUS at 14:35

## 2024-11-03 RX ADMIN — PIPERACILLIN AND TAZOBACTAM 25 GRAM(S): .5; 4 INJECTION, POWDER, LYOPHILIZED, FOR SOLUTION INTRAVENOUS at 13:41

## 2024-11-03 RX ADMIN — CHLORHEXIDINE GLUCONATE 1 APPLICATION(S): 40 SOLUTION TOPICAL at 20:39

## 2024-11-03 RX ADMIN — Medication 15 MILLILITER(S): at 11:39

## 2024-11-03 RX ADMIN — HYDRALAZINE HYDROCHLORIDE 5 MILLIGRAM(S): 50 TABLET, FILM COATED ORAL at 05:43

## 2024-11-03 RX ADMIN — VALPROIC ACID 52.5 MILLIGRAM(S): 250 SOLUTION ORAL at 14:30

## 2024-11-03 RX ADMIN — DEXMEDETOMIDINE HYDROCHLORIDE 3.74 MICROGRAM(S)/KG/HR: 400 INJECTION, SOLUTION INTRAVENOUS at 01:34

## 2024-11-03 RX ADMIN — HEPARIN SODIUM 5000 UNIT(S): 10000 INJECTION INTRAVENOUS; SUBCUTANEOUS at 20:38

## 2024-11-03 RX ADMIN — TRAZODONE HYDROCHLORIDE 100 MILLIGRAM(S): 100 TABLET ORAL at 20:38

## 2024-11-03 RX ADMIN — Medication 20 MILLIGRAM(S): at 20:38

## 2024-11-03 RX ADMIN — Medication 2 MILLIGRAM(S): at 20:38

## 2024-11-03 RX ADMIN — PIPERACILLIN AND TAZOBACTAM 200 GRAM(S): .5; 4 INJECTION, POWDER, LYOPHILIZED, FOR SOLUTION INTRAVENOUS at 10:37

## 2024-11-03 RX ADMIN — Medication 2 TABLET(S): at 20:38

## 2024-11-03 RX ADMIN — Medication 1000 MILLIGRAM(S): at 03:00

## 2024-11-03 RX ADMIN — HALOPERIDOL DECANOATE 2 MILLIGRAM(S): 50 INJECTION INTRAMUSCULAR at 14:40

## 2024-11-03 RX ADMIN — PIPERACILLIN AND TAZOBACTAM 25 GRAM(S): .5; 4 INJECTION, POWDER, LYOPHILIZED, FOR SOLUTION INTRAVENOUS at 21:03

## 2024-11-03 RX ADMIN — Medication 10 MILLIGRAM(S): at 10:36

## 2024-11-03 NOTE — PROGRESS NOTE ADULT - SUBJECTIVE AND OBJECTIVE BOX
Patient seen and examined at bedside.    --Anticoagulation--    T(C): 37 (11-02-24 @ 23:00), Max: 38 (11-02-24 @ 20:00)  HR: 61 (11-03-24 @ 00:00) (56 - 111)  BP: 120/74 (11-02-24 @ 08:46) (115/67 - 130/90)  RR: 18 (11-03-24 @ 00:00) (10 - 25)  SpO2: 100% (11-03-24 @ 00:00) (91% - 100%)  Wt(kg): --    Exam: AOx2 w/ choices, FCx4, EOMI, no facial, slight LUE drift, MCCABE 5/5.

## 2024-11-03 NOTE — PROGRESS NOTE ADULT - TIME BILLING
large left chronic subacute acute subdural hematoma with midline shift and brain compression, s/p Craniotomy, supratentorial, for subdural hematoma evacuation POD 1,  monitor subgaleal drain output, agitated, CT head today stable, non rebreather for pneumoncephali, on precedex, neuro exam confused agitated , refusing to be examined, RADHA , intact EOM, at least antigravity in all 4. neuro checks q 2 hr, keppra 500 mg BID for seizure prophylaxis change to  mg q 8 hr given agitation, CT cervical spine no fracture, prexcedex, add haldol as needed given inability to give seroquel while NPO,  RA, NPO for now as he is agitated and pocketing the medications, will get another swallow eval, NS 40 ml/hr while NPO,  ( was on aspirin 81 mg daily at home to be cleared by NS ) , history of UTI/pyelonephritis 2/2 L hydroureteronephrosis s/p emergent cystoscoy and left ureteral stent placement and treated w/ abx treated in aug 2024. CT abd pelvis improved hydronephrosis , urine cx at [facility] pending,  continue ceftriaxone, in august  he was growing E fecalus sensitive to ceftriaxone,  SBP goal >110 mmhg, will get TTE pending, ECG qtc 471 ,  start chemorpophylaxis if ok wit NS      high complex medical decision large left chronic subacute acute subdural hematoma with midline shift and brain compression, s/p Craniotomy, supratentorial, for subdural hematoma evacuation POD 1,  monitor subgaleal drain output, agitated, CT head today stable, non rebreather for pneumoncephali, on precedex, neuro exam confused agitated , refusing to be examined, RADHA , intact EOM, at least antigravity in all 4. neuro checks q 4 hr, keppra 500 mg BID for seizure prophylaxis change to  mg q 8 hr given agitation, CT cervical spine no fracture, prexcedex, add haldol as needed given inability to give seroquel while NPO,  CT head stable, was on non rebreather, wean to NC and give lasix 10 mg iv once for pulm edema,  NPO for now as he is agitated and pocketing the medications, will get another swallow eval, add senna and miralax   ( was on aspirin 81 mg daily at home to be cleared by NS ) , history of UTI/pyelonephritis 2/2 L hydroureteronephrosis s/p emergent cystoscoy and left ureteral stent placement and treated w/ abx treated in aug 2024. CT abd pelvis improved hydronephrosis , urine cx at PV pending,  on ceftriaxone, in august  he was growing E fecalus sensitive to ceftriaxone, given concern for aspiration, will change it to zosyn, SBP goal >110 mmhg, will get TTE pending, ECG qtc 471 ,  start chemoprophylaxis if ok wit NS      high complex medical decision large left chronic subacute acute subdural hematoma with midline shift and brain compression, s/p Craniotomy, supratentorial, for subdural hematoma evacuation POD 1,  monitor subgaleal drain output, agitated, CT head today stable, non rebreather for pneumoncephali, on precedex, neuro exam confused agitated , refusing to be examined, RADHA , intact EOM, at least antigravity in all 4. neuro checks q 4 hr, keppra 500 mg BID for seizure prophylaxis change to  mg q 8 hr given agitation, CT cervical spine no fracture, prexcedex, once NG in place seroquel 25 mg in am and resume home trazodone at night,   CT head stable, was on non rebreather, wean to NC and give lasix 10 mg iv once for pulm edema,  NPO for now as he is agitated and pocketing the medications, place NG tube, add senna and miralax   ( was on aspirin 81 mg daily at home to be cleared by NS ) , history of UTI/pyelonephritis 2/2 L hydroureteronephrosis s/p emergent cystoscoy and left ureteral stent placement and treated w/ abx treated in aug 2024. CT abd pelvis improved hydronephrosis , urine cx at PV pending,  on ceftriaxone, in august  he was growing E fecalus sensitive to ceftriaxone, given concern for aspiration, will change it to zosyn, SBP goal >110 mmhg, will get TTE pending, ECG qtc 471 ,  start chemoprophylaxis if ok wit NS      high complex medical decision

## 2024-11-03 NOTE — PROGRESS NOTE ADULT - ASSESSMENT
-POD1 Left minicrani for SDH evac w/ SG BAKARI placement.  -Postop CTH with good SDH evac and minor residual.  -Repeat CTH in AM.  --140.  -Keppra 500BID.

## 2024-11-03 NOTE — PROGRESS NOTE ADULT - SUBJECTIVE AND OBJECTIVE BOX
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 (1.2cm) transferred to [facility] for further management    OVERNIGHT EVENTS:     HOSPITAL COURSE  11/02/2024: L minicraniotomy for SDH evacuation planned    ADMISSION SCORES:   GCS: HH: MF: NIHSS: ICH Score:    ----------------------------------------------------------------------------------------------------  PHYSICAL EXAM:  General:   HEENT:   Neuro: A&O x 1, EOS, PERRL, EOMI, no facial, BUE 4+/5, RUE drift, BLE 4/5    CV: regular rate  Pulm: no accessory muscle use  Abd: soft, nontender, nondistended, []TF  Skin: warm, dry, [] incontinence associated skin breakdown    -----------------------------------------------------------------------------------------------------  ICU Vital Signs Last 24 Hrs  T(C): 37 (02 Nov 2024 23:00), Max: 38 (02 Nov 2024 20:00)  T(F): 98.6 (02 Nov 2024 23:00), Max: 100.4 (02 Nov 2024 20:00)  HR: 61 (03 Nov 2024 00:00) (56 - 111)  BP: 120/74 (02 Nov 2024 08:46) (115/67 - 130/90)  BP(mean): 89 (02 Nov 2024 08:46) (89 - 104)  ABP: 117/66 (03 Nov 2024 00:00) (93/40 - 165/81)  ABP(mean): 85 (03 Nov 2024 00:00) (60 - 118)  RR: 18 (03 Nov 2024 00:00) (10 - 25)  SpO2: 100% (03 Nov 2024 00:00) (91% - 100%)    O2 Parameters below as of 02 Nov 2024 19:00  Patient On (Oxygen Delivery Method): mask, nonrebreather              MEDICATIONS  (STANDING):  chlorhexidine 4% Liquid 1 Application(s) Topical daily  dexMEDEtomidine Infusion 0.2 MICROgram(s)/kG/Hr (3.74 mL/Hr) IV Continuous <Continuous>  levETIRAcetam   Injectable 500 milliGRAM(s) IV Push every 12 hours  pantoprazole  Injectable 40 milliGRAM(s) IV Push daily    RESPIRATORY:    IMAGING:   Recent imaging studies were reviewed.    LAB RESULTS:                          8.8    6.46  )-----------( 199      ( 02 Nov 2024 04:51 )             28.1       PT/INR - ( 02 Nov 2024 04:51 )   PT: 12.7 sec;   INR: 1.11 ratio         PTT - ( 02 Nov 2024 04:51 )  PTT:26.4 sec    11-02    142  |  110[H]  |  30[H]  ----------------------------<  95  4.1   |  25  |  0.96    Ca    8.0[L]      02 Nov 2024 04:52    TPro  5.6[L]  /  Alb  3.2[L]  /  TBili  0.3  /  DBili  x   /  AST  22  /  ALT  15  /  AlkPhos  75  11-02          -----------------------------------------------------------------------------------------------------------------------------------------------------------------------------------    ALLERGIES: Allergies    No Known Allergies    Intolerances         SUMMARY:  Pt is a 89yo M wit hhistory of dementia (baseline AOx2), HTN, PVD, recent hospitalization for hydroureteronephrosis s/p L ureteral stent and antibiotics presented to [facility] with slurred speech and difficulty walking from baseline found to have a 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 for further management    OVERNIGHT EVENTS:     HOSPITAL COURSE  11/02/2024: L minicraniotomy for SDH evacuation planned    ADMISSION SCORES:   GCS: HH: MF: NIHSS: ICH Score:    ----------------------------------------------------------------------------------------------------  PHYSICAL EXAM:  General:   HEENT:   Neuro: A&O x 1, EOS, PERRL, EOMI, no facial, BUE 4+/5, RUE drift, BLE 4/5    CV: regular rate  Pulm: no accessory muscle use  Abd: soft, nontender, nondistended, []TF  Skin: warm, dry, [] incontinence associated skin breakdown    -----------------------------------------------------------------------------------------------------  IT(C): 35.8 (11-03-24 @ 07:00), Max: 38 (11-02-24 @ 20:00)  HR: 93 (11-03-24 @ 07:00) (56 - 111)  BP: 92/50 (11-03-24 @ 07:00) (92/50 - 92/50)  RR: 20 (11-03-24 @ 07:00) (10 - 25)  SpO2: 96% (11-03-24 @ 07:00) (91% - 100%)  11-02-24 @ 08:01  -  11-03-24 @ 07:00  --------------------------------------------------------  IN: 839.5 mL / OUT: 1030 mL / NET: -190.5 mL    cefTRIAXone   IVPB 2000 milliGRAM(s) IV Intermittent every 24 hours  chlorhexidine 4% Liquid 1 Application(s) Topical daily  dexMEDEtomidine Infusion 0.2 MICROgram(s)/kG/Hr IV Continuous <Continuous>  haloperidol    Injectable 2 milliGRAM(s) IV Push once PRN  multivitamin/minerals/iron Oral Solution (CENTRUM) 15 milliLiter(s) Oral daily  ondansetron Injectable 4 milliGRAM(s) IV Push every 6 hours PRN  oxyCODONE    IR 10 milliGRAM(s) Oral every 4 hours PRN  oxyCODONE    IR 5 milliGRAM(s) Oral every 4 hours PRN  QUEtiapine 12.5 milliGRAM(s) Oral at bedtime  valproate sodium   IVPB 250 milliGRAM(s) IV Intermittent every 8 hours  Mode: CPAP with PS, FiO2: 40, PEEP: 5, PS: 10, MAP: 9, PIP: 16    MEDICATIONS  (STANDING):  chlorhexidine 4% Liquid 1 Application(s) Topical daily  dexMEDEtomidine Infusion 0.2 MICROgram(s)/kG/Hr (3.74 mL/Hr) IV Continuous <Continuous>  levETIRAcetam   Injectable 500 milliGRAM(s) IV Push every 12 hours  pantoprazole  Injectable 40 milliGRAM(s) IV Push daily    RESPIRATORY:    IMAGING:   Recent imaging studies were reviewed.      LABS:  Na: 141 (11-03 @ 04:09), 142 (11-03 @ 00:41), 141 (11-02 @ 12:26), 142 (11-02 @ 04:52)  K: 4.7 (11-03 @ 04:09), 4.5 (11-03 @ 00:41), 3.6 (11-02 @ 12:26), 4.1 (11-02 @ 04:52)  Cl: 109 (11-03 @ 04:09), 110 (11-03 @ 00:41), 108 (11-02 @ 12:26), 110 (11-02 @ 04:52)  CO2: 22 (11-03 @ 04:09), 21 (11-03 @ 00:41), 22 (11-02 @ 12:26), 25 (11-02 @ 04:52)  BUN: 30 (11-03 @ 04:09), 27 (11-03 @ 00:41), 28 (11-02 @ 12:26), 30 (11-02 @ 04:52)  Cr: 0.95 (11-03 @ 04:09), 0.85 (11-03 @ 00:41), 0.79 (11-02 @ 12:26), 0.96 (11-02 @ 04:52)  Glu: 97(11-03 @ 04:09), 108(11-03 @ 00:41), 105(11-02 @ 12:26), 95(11-02 @ 04:52)    Hgb: 8.1 (11-03 @ 00:40), 8.1 (11-02 @ 12:26), 8.8 (11-02 @ 04:51)  Hct: 26.3 (11-03 @ 00:40), 26.3 (11-02 @ 12:26), 28.1 (11-02 @ 04:51)  WBC: 6.61 (11-03 @ 00:40), 5.56 (11-02 @ 12:26), 6.46 (11-02 @ 04:51)  Plt: 178 (11-03 @ 00:40), 172 (11-02 @ 12:26), 199 (11-02 @ 04:51)    INR: 1.11 11-02-24 @ 04:51  PTT: 26.4 11-02-24 @ 04:51

## 2024-11-03 NOTE — PROGRESS NOTE ADULT - ASSESSMENT
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 Pt received DDAVP at [place] prior to transfer.  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 (1.2cm)      NEURO:  -s/p minicrani for L acute on chronic SDH with MLS  - subgealeal drain in place  - s/p DDAVP at [facility] for ASA use  - ARU shows non-therapeutic  -Neurochecks q1h  -further plan will be determined after surgical intervention    Activity: [] mobilize as tolerated [] Bedrest [] PT [] OT [] PMNR    CV:  -SBP goal: 100-140  -MAP>65    PULM:  -on non-rebreather for pneumocephalus, maintain O2sat>92%  -Incentive Spirometer    RENAL:  -Fluids:    GI:  -Diet: NPO, pending dysphagia screening post-surgery  -GI prophylaxis:   -Last BM: PTA  -Bowel regimen [] colace [] senna [] other:    ENDO:   -Goal euglycemia (-180)    HEME/ONC:  VTE prophylaxis: SCDs  LED 11/01/2024 at [place] negative  will repeat LED     ID:  UA at [place] [facility] consistent with infection  treated in 08/2024 for hydroureteronephrosis s/p stent placement and antibiotics  s/p ceftriaxone at [facility]  unknown if stent still in place  repeat UA and obtain urine culture  C/W ceftriaxone for UTI    MISC:  GOC - pt previous DNR/DNI which was rescinded for surgical intervention per daughter. Will re-address GOC with family post-surgery.     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 Pt received DDAVP at [place] prior to transfer.  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 (1.2cm)      NEURO:  -s/p minicrani for L acute on chronic SDH with MLS  - subgealeal drain in place  - s/p DDAVP at [facility] for ASA use  -Neurochecks q2h  - CT head stable   -further plan will be determined after surgical intervention  agitation, on precedex, add haldol as needed     Activity: [] mobilize as tolerated [] Bedrest [] PT [] OT [] PMNR    CV:  -SBP goal: 100-140  -MAP>65  - TTE pending     PULM:  -on non-rebreather for pneumocephalus, maintain O2sat>92%  -Incentive Spirometer    RENAL:  -Fluids:    GI:  -Diet: NPO   -GI prophylaxis:   -Last BM: PTA  -Bowel regimen [] colace [] senna [] other:    ENDO:   -Goal euglycemia (-180)    HEME/ONC:  VTE prophylaxis: SCDs  LED no DVT      ID:  UA at [place] consistent with infection  treated in 08/2024 for hydroureteronephrosis s/p stent placement and antibiotics  s/p ceftriaxone at [facility]  unknown if stent still in place   urine culture PENDING Results   C/W ceftriaxone for UTI    MISC:  GOC - pt previous DNR/DNI which was rescinded for surgical intervention per daughter. Will re-address GOC with family post-surgery.     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 Pt received DDAVP at [place] prior to transfer.  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 (1.2cm)      NEURO:  -s/p minicrani for L acute on chronic SDH with MLS  - subgealeal drain in place  - s/p DDAVP at [facility] for ASA use  -Neurochecks q2h  - CT head stable   -further plan will be determined after surgical intervention  agitation, on precedex, add haldol as needed     Activity: [] mobilize as tolerated [] Bedrest [] PT [] OT [] PMNR    CV:  -SBP goal: 100-140  -MAP>65  - TTE pending     PULM:  -on non-rebreather for pneumocephalus, maintain O2sat>92%  - chest xray congested, will give lasix 10 mg iv x 1   -Incentive Spirometer    RENAL:  -Fluids:    GI:  -Diet: NPO   -GI prophylaxis:   -Last BM: PTA  -Bowel regimen [] colace [] senna [] other:    ENDO:   -Goal euglycemia (-180)    HEME/ONC:  VTE prophylaxis: SCDs  LED no DVT      ID:  UA at [place] consistent with infection  treated in 08/2024 for hydroureteronephrosis s/p stent placement and antibiotics  s/p ceftriaxone at [facility]  unknown if stent still in place   urine culture PENDING Results   C/W ceftriaxone for UTI    MISC:  GOC - pt previous DNR/DNI which was rescinded for surgical intervention per daughter. Will re-address GOC with family post-surgery.     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 Pt received DDAVP at [place] prior to transfer.  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 (1.2cm)      NEURO:  -s/p minicrani for L acute on chronic SDH with MLS  - subgealeal drain in place  - s/p DDAVP at [facility] for ASA use  -Neurochecks q2h  - CT head stable   -further plan will be determined after surgical intervention  agitation, on precedex, add haldol as needed     Activity: [] mobilize as tolerated [] Bedrest [] PT [] OT [] PMNR    CV:  -SBP goal: 100-140  -MAP>65  - TTE pending     PULM:  -on non-rebreather for pneumocephalus, maintain O2sat>92%  - chest xray congested, will give lasix 10 mg iv x 1   -Incentive Spirometer    RENAL:  -Fluids:    GI:  -Diet: NPO   -GI prophylaxis:  - place NG    -Last BM: PTA  -Bowel regimen [] colace [] senna [] other:    ENDO:   -Goal euglycemia (-180)    HEME/ONC:  VTE prophylaxis: SCDs  LED no DVT      ID:  UA at [place] consistent with infection  treated in 08/2024 for hydroureteronephrosis s/p stent placement and antibiotics  s/p ceftriaxone at [facility]  unknown if stent still in place   urine culture PENDING Results   C/W ceftriaxone for UTI    MISC:  GOC - pt previous DNR/DNI which was rescinded for surgical intervention per daughter. Will re-address GOC with family post-surgery.     SOCIAL/FAMILY:  [] awaiting [] updated at bedside [] family meeting    CODE STATUS:  [x] Full Code [] DNR [] DNI [] Palliative/Comfort Care    DISPOSITION:  [x] ICU [] Stroke Unit [] Floor [] EMU [] DC Home    Contact: [phone]

## 2024-11-04 DIAGNOSIS — S06.5XAA TRAUMATIC SUBDURAL HEMORRHAGE WITH LOSS OF CONSCIOUSNESS STATUS UNKNOWN, INITIAL ENCOUNTER: ICD-10-CM

## 2024-11-04 DIAGNOSIS — I42.9 CARDIOMYOPATHY, UNSPECIFIED: ICD-10-CM

## 2024-11-04 LAB
ANION GAP SERPL CALC-SCNC: 9 MMOL/L — SIGNIFICANT CHANGE UP (ref 5–17)
APPEARANCE UR: ABNORMAL
BACTERIA # UR AUTO: NEGATIVE /HPF — SIGNIFICANT CHANGE UP
BILIRUB UR-MCNC: NEGATIVE — SIGNIFICANT CHANGE UP
BUN SERPL-MCNC: 33 MG/DL — HIGH (ref 7–23)
CALCIUM SERPL-MCNC: 7.8 MG/DL — LOW (ref 8.4–10.5)
CAST: 1 /LPF — SIGNIFICANT CHANGE UP (ref 0–4)
CHLORIDE SERPL-SCNC: 108 MMOL/L — SIGNIFICANT CHANGE UP (ref 96–108)
CO2 SERPL-SCNC: 22 MMOL/L — SIGNIFICANT CHANGE UP (ref 22–31)
COLOR SPEC: SIGNIFICANT CHANGE UP
CREAT SERPL-MCNC: 0.92 MG/DL — SIGNIFICANT CHANGE UP (ref 0.5–1.3)
DIFF PNL FLD: ABNORMAL
EGFR: 75 ML/MIN/1.73M2 — SIGNIFICANT CHANGE UP
GLUCOSE SERPL-MCNC: 82 MG/DL — SIGNIFICANT CHANGE UP (ref 70–99)
GLUCOSE UR QL: NEGATIVE MG/DL — SIGNIFICANT CHANGE UP
HCT VFR BLD CALC: 26 % — LOW (ref 39–50)
HGB BLD-MCNC: 8.2 G/DL — LOW (ref 13–17)
KETONES UR-MCNC: 15 MG/DL
LEUKOCYTE ESTERASE UR-ACNC: ABNORMAL
MAGNESIUM SERPL-MCNC: 2.2 MG/DL — SIGNIFICANT CHANGE UP (ref 1.6–2.6)
MCHC RBC-ENTMCNC: 31.2 PG — SIGNIFICANT CHANGE UP (ref 27–34)
MCHC RBC-ENTMCNC: 31.5 G/DL — LOW (ref 32–36)
MCV RBC AUTO: 98.9 FL — SIGNIFICANT CHANGE UP (ref 80–100)
NITRITE UR-MCNC: NEGATIVE — SIGNIFICANT CHANGE UP
NRBC # BLD: 0 /100 WBCS — SIGNIFICANT CHANGE UP (ref 0–0)
PH UR: 5.5 — SIGNIFICANT CHANGE UP (ref 5–8)
PHOSPHATE SERPL-MCNC: 2.7 MG/DL — SIGNIFICANT CHANGE UP (ref 2.5–4.5)
PLATELET # BLD AUTO: 163 K/UL — SIGNIFICANT CHANGE UP (ref 150–400)
POTASSIUM SERPL-MCNC: 4 MMOL/L — SIGNIFICANT CHANGE UP (ref 3.5–5.3)
POTASSIUM SERPL-SCNC: 4 MMOL/L — SIGNIFICANT CHANGE UP (ref 3.5–5.3)
PROT UR-MCNC: 100 MG/DL
RBC # BLD: 2.63 M/UL — LOW (ref 4.2–5.8)
RBC # FLD: 15.8 % — HIGH (ref 10.3–14.5)
RBC CASTS # UR COMP ASSIST: 1321 /HPF — HIGH (ref 0–4)
SODIUM SERPL-SCNC: 139 MMOL/L — SIGNIFICANT CHANGE UP (ref 135–145)
SP GR SPEC: 1.02 — SIGNIFICANT CHANGE UP (ref 1–1.03)
SQUAMOUS # UR AUTO: 2 /HPF — SIGNIFICANT CHANGE UP (ref 0–5)
UROBILINOGEN FLD QL: 0.2 MG/DL — SIGNIFICANT CHANGE UP (ref 0.2–1)
WBC # BLD: 6.18 K/UL — SIGNIFICANT CHANGE UP (ref 3.8–10.5)
WBC # FLD AUTO: 6.18 K/UL — SIGNIFICANT CHANGE UP (ref 3.8–10.5)
WBC UR QL: 33 /HPF — HIGH (ref 0–5)

## 2024-11-04 PROCEDURE — 71045 X-RAY EXAM CHEST 1 VIEW: CPT | Mod: 26

## 2024-11-04 PROCEDURE — 70450 CT HEAD/BRAIN W/O DYE: CPT | Mod: 26

## 2024-11-04 PROCEDURE — 99233 SBSQ HOSP IP/OBS HIGH 50: CPT

## 2024-11-04 RX ORDER — POTASSIUM PHOSPHATE 236; 224 MG/ML; MG/ML
15 INJECTION, SOLUTION INTRAVENOUS ONCE
Refills: 0 | Status: COMPLETED | OUTPATIENT
Start: 2024-11-04 | End: 2024-11-04

## 2024-11-04 RX ORDER — PIPERACILLIN AND TAZOBACTAM .5; 4 G/20ML; G/20ML
3.38 INJECTION, POWDER, LYOPHILIZED, FOR SOLUTION INTRAVENOUS EVERY 8 HOURS
Refills: 0 | Status: DISCONTINUED | OUTPATIENT
Start: 2024-11-04 | End: 2024-11-06

## 2024-11-04 RX ORDER — TRAZODONE HYDROCHLORIDE 100 MG/1
100 TABLET ORAL
Refills: 0 | Status: DISCONTINUED | OUTPATIENT
Start: 2024-11-04 | End: 2024-11-04

## 2024-11-04 RX ORDER — HEPARIN SODIUM 10000 [USP'U]/ML
5000 INJECTION INTRAVENOUS; SUBCUTANEOUS EVERY 8 HOURS
Refills: 0 | Status: DISCONTINUED | OUTPATIENT
Start: 2024-11-04 | End: 2024-11-07

## 2024-11-04 RX ORDER — ACETAMINOPHEN 500 MG
1000 TABLET ORAL ONCE
Refills: 0 | Status: COMPLETED | OUTPATIENT
Start: 2024-11-04 | End: 2024-11-04

## 2024-11-04 RX ORDER — MEMANTINE HYDROCHLORIDE 21 MG/1
5 CAPSULE, EXTENDED RELEASE ORAL
Refills: 0 | Status: DISCONTINUED | OUTPATIENT
Start: 2024-11-04 | End: 2024-11-06

## 2024-11-04 RX ORDER — LIDOCAINE HCL 60 MG/3 ML
10 SYRINGE (ML) INJECTION ONCE
Refills: 0 | Status: DISCONTINUED | OUTPATIENT
Start: 2024-11-04 | End: 2024-11-04

## 2024-11-04 RX ORDER — TRAZODONE HYDROCHLORIDE 100 MG/1
100 TABLET ORAL
Refills: 0 | Status: DISCONTINUED | OUTPATIENT
Start: 2024-11-05 | End: 2024-11-06

## 2024-11-04 RX ORDER — QUETIAPINE FUMARATE 200 MG/1
50 TABLET ORAL DAILY
Refills: 0 | Status: DISCONTINUED | OUTPATIENT
Start: 2024-11-05 | End: 2024-11-06

## 2024-11-04 RX ADMIN — Medication 1000 MILLIGRAM(S): at 12:15

## 2024-11-04 RX ADMIN — Medication 10 MILLIGRAM(S): at 21:02

## 2024-11-04 RX ADMIN — MEMANTINE HYDROCHLORIDE 5 MILLIGRAM(S): 21 CAPSULE, EXTENDED RELEASE ORAL at 17:25

## 2024-11-04 RX ADMIN — VALPROIC ACID 52.5 MILLIGRAM(S): 250 SOLUTION ORAL at 05:06

## 2024-11-04 RX ADMIN — VALPROIC ACID 52.5 MILLIGRAM(S): 250 SOLUTION ORAL at 21:02

## 2024-11-04 RX ADMIN — POTASSIUM PHOSPHATE 62.5 MILLIMOLE(S): 236; 224 INJECTION, SOLUTION INTRAVENOUS at 05:05

## 2024-11-04 RX ADMIN — DEXMEDETOMIDINE HYDROCHLORIDE 3.74 MICROGRAM(S)/KG/HR: 400 INJECTION, SOLUTION INTRAVENOUS at 19:05

## 2024-11-04 RX ADMIN — Medication 15 MILLILITER(S): at 11:46

## 2024-11-04 RX ADMIN — QUETIAPINE FUMARATE 50 MILLIGRAM(S): 200 TABLET ORAL at 05:05

## 2024-11-04 RX ADMIN — PIPERACILLIN AND TAZOBACTAM 25 GRAM(S): .5; 4 INJECTION, POWDER, LYOPHILIZED, FOR SOLUTION INTRAVENOUS at 13:28

## 2024-11-04 RX ADMIN — PIPERACILLIN AND TAZOBACTAM 25 GRAM(S): .5; 4 INJECTION, POWDER, LYOPHILIZED, FOR SOLUTION INTRAVENOUS at 05:05

## 2024-11-04 RX ADMIN — TRAZODONE HYDROCHLORIDE 100 MILLIGRAM(S): 100 TABLET ORAL at 17:25

## 2024-11-04 RX ADMIN — VALPROIC ACID 52.5 MILLIGRAM(S): 250 SOLUTION ORAL at 13:28

## 2024-11-04 RX ADMIN — HEPARIN SODIUM 5000 UNIT(S): 10000 INJECTION INTRAVENOUS; SUBCUTANEOUS at 05:05

## 2024-11-04 RX ADMIN — PIPERACILLIN AND TAZOBACTAM 25 GRAM(S): .5; 4 INJECTION, POWDER, LYOPHILIZED, FOR SOLUTION INTRAVENOUS at 21:02

## 2024-11-04 RX ADMIN — CHLORHEXIDINE GLUCONATE 1 APPLICATION(S): 40 SOLUTION TOPICAL at 21:03

## 2024-11-04 RX ADMIN — DEXMEDETOMIDINE HYDROCHLORIDE 3.74 MICROGRAM(S)/KG/HR: 400 INJECTION, SOLUTION INTRAVENOUS at 01:20

## 2024-11-04 RX ADMIN — Medication 100 MILLIGRAM(S): at 11:46

## 2024-11-04 RX ADMIN — HEPARIN SODIUM 5000 UNIT(S): 10000 INJECTION INTRAVENOUS; SUBCUTANEOUS at 21:02

## 2024-11-04 RX ADMIN — Medication 2 MILLIGRAM(S): at 21:03

## 2024-11-04 RX ADMIN — POLYETHYLENE GLYCOL 3350 17 GRAM(S): 17 POWDER, FOR SOLUTION ORAL at 17:25

## 2024-11-04 RX ADMIN — Medication 400 MILLIGRAM(S): at 11:45

## 2024-11-04 RX ADMIN — Medication 2 TABLET(S): at 21:03

## 2024-11-04 NOTE — CHART NOTE - NSCHARTNOTEFT_GEN_A_CORE
Subgaleal drain cleared for removal per Dr. Godfrey. Drain off-suction and removed without complication.  2 Lorelei applied for hemostasis.  Patient tolerated procedure well. Lay flat for 1hr. Nurse notified  Assisted with Clarissa Lal PA-C

## 2024-11-04 NOTE — CONSULT NOTE ADULT - PROBLEM SELECTOR RECOMMENDATION 9
Large left chronic subacute acute subdural hematoma with midline shift and brain compression, s/p Craniotomy, supratentorial, for subdural hematoma evacuation, CT head today stable  orders per NSCU

## 2024-11-04 NOTE — PROGRESS NOTE ADULT - SUBJECTIVE AND OBJECTIVE BOX
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 His ICU course has been notable for agitation requiring dexmedetomidine.     24 HOUR EVENTS:  Levetiracetam changed to VPA. Weaned off dexmedetomidine gtt in the day, but became agitated and with risk if harming self/staff, so placed back on dexmedetomidine gtt.     EXAMINATION:   Neuro: A&O x 1, follows commands with coaxing, PERRL, EOMI, no facial, BUE 4+/5, RUE drift, BLE 4/5

## 2024-11-04 NOTE — DIETITIAN INITIAL EVALUATION ADULT - ADD RECOMMEND
1) Will continue to monitor PO intake, weight, labs, skin, GI status and diet 2) RD to add Mighty shake daily to aid in meeting nutrient needs

## 2024-11-04 NOTE — DIETITIAN INITIAL EVALUATION ADULT - ORAL INTAKE PTA/DIET HISTORY
visited pt at bedside this morning. confused and agitated, interview not appropriate at this time. per electronic medical record, no known allergies.

## 2024-11-04 NOTE — PROGRESS NOTE ADULT - ASSESSMENT
ASSESSMENT/PLAN: SDH, POD 2    NEURO:  Neurochecks q2h  subgaleal drain in place; monitor output  on 1:1 for safety  delirium precautions  increase quetiapine as needed and as tolerated based on Qtc  VPA for behavior and seizure ppx end date: 11/10/2024  Seroquel 50 qAM  Trazodone 100 at night  restart Memantine 5 BID  titrate Precedex as tolerated  activity: mobilize as tolerated    CV:  ECHO: regional wall abnormalities  EKG to obtain Qtc    SBP goal: 100-140mmHg    PULM:  incentive spirometry as tolerated    RENAL:  PRN electrolyte repletion    GI:  diet: CCD puree, given poor PO intake 2/2   bowel regimen     ENDO:   Goal euglycemia (-180)    HEME/ONC:  VTE prophylaxis: SCDs, chemoppx per NSGY    ID:  Pneumonia  c/w Zosyn for total 5d, last day: 11/06/2024

## 2024-11-04 NOTE — PROGRESS NOTE ADULT - TIME BILLING
Data reviewed, patient seen/examined and care plan reviewed/updated with night time ACPs/bedside RN.

## 2024-11-04 NOTE — DIETITIAN INITIAL EVALUATION ADULT - CONTINUE CURRENT NUTRITION CARE PLAN
defer diet texture/liquid consistency to team/SLP recommendations  d/c carbohydrate restriction from diet

## 2024-11-04 NOTE — PROGRESS NOTE ADULT - SUBJECTIVE AND OBJECTIVE BOX
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 His ICU course has been notable for agitation requiring dexmedetomidine.     24 HOUR EVENTS:  Levetiracetam changed to VPA. Weaned off dexmedetomidine gtt in the day, but became agitated and with risk if harming self/staff, so placed back on dexmedetomidine gtt.     EXAMINATION:   General: No acute distress  HEENT: Anicteric sclerae  Cardiac: [id number]  Lungs: Clear  Abdomen: Soft, non-tender, +BS  Extremities: No c/c/e  Skin/Incision Site: Clean, dry and intact  Neurologic: Awake, alert, fully oriented, follows commands, PERRL, VFFtc, EOMI, face symmetric, tongue midline, no drift, full strength  Neuro: A&O x 1, follows commands with coaxing, PERRL, EOMI, no facial, BUE 4+/5, RUE drift, BLE 4/5

## 2024-11-04 NOTE — DIETITIAN INITIAL EVALUATION ADULT - REASON FOR ADMISSION
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

## 2024-11-04 NOTE — DIETITIAN INITIAL EVALUATION ADULT - REASON INDICATOR FOR ASSESSMENT
length of stay. information obtained from electronic medical record, nursing, team during interdisciplinary rounds

## 2024-11-04 NOTE — PROGRESS NOTE ADULT - SUBJECTIVE AND OBJECTIVE BOX
Patient seen and examined at bedside.    --Anticoagulation--  heparin   Injectable 5000 Unit(s) SubCutaneous every 8 hours    T(C): 36.4 (11-03-24 @ 23:30), Max: 38 (11-03-24 @ 06:00)  HR: 60 (11-03-24 @ 23:30) (58 - 93)  BP: 139/63 (11-03-24 @ 23:30) (92/50 - 139/63)  RR: 19 (11-03-24 @ 23:30) (15 - 20)  SpO2: 100% (11-03-24 @ 23:30) (96% - 100%)  Wt(kg): --    Exam:Confused, Ox1, EOS, PERRL, EOMI, no facial, FC, BUE 4+/5, RUE drift, BLE 4+/5

## 2024-11-04 NOTE — PROGRESS NOTE ADULT - ASSESSMENT
ASSESSMENT/PLAN: SDH, POD 2    NEURO:  subgaleal drain in place; monitor output  on 1:1 for safety  delirium precautions  increase quetiapine as needed and as tolerated based on Qtc  VPA for behavior and seizure ppx  activity: mobilize as tolerated    CV:  SBP goal: 100-140mmHg    PULM:  incentive spirometry as tolerated    RENAL:  PRN electrolyte repletion    GI:  diet: CCD puree  bowel regimen     ENDO:   Goal euglycemia (-180)    HEME/ONC:  VTE prophylaxis: SCDs, chemoppx per NSGY    ID:  on antibiotics for UTI; f/u cx

## 2024-11-04 NOTE — DIETITIAN INITIAL EVALUATION ADULT - PERTINENT LABORATORY DATA
11-04    139  |  108  |  33[H]  ----------------------------<  82  4.0   |  22  |  0.92    Ca    7.8[L]      04 Nov 2024 00:22  Phos  2.7     11-04  Mg     2.2     11-04

## 2024-11-05 ENCOUNTER — TRANSCRIPTION ENCOUNTER (OUTPATIENT)
Age: 89
End: 2024-11-05

## 2024-11-05 LAB
CULTURE RESULTS: SIGNIFICANT CHANGE UP
SPECIMEN SOURCE: SIGNIFICANT CHANGE UP

## 2024-11-05 PROCEDURE — 99233 SBSQ HOSP IP/OBS HIGH 50: CPT

## 2024-11-05 RX ORDER — IPRATROPIUM BROMIDE AND ALBUTEROL SULFATE .5; 2.5 MG/3ML; MG/3ML
3 SOLUTION RESPIRATORY (INHALATION) ONCE
Refills: 0 | Status: COMPLETED | OUTPATIENT
Start: 2024-11-05 | End: 2024-11-05

## 2024-11-05 RX ORDER — LABETALOL HCL 200 MG
5 TABLET ORAL EVERY 6 HOURS
Refills: 0 | Status: DISCONTINUED | OUTPATIENT
Start: 2024-11-05 | End: 2024-11-07

## 2024-11-05 RX ORDER — VALPROIC ACID 250 MG/5ML
250 SOLUTION ORAL EVERY 8 HOURS
Refills: 0 | Status: DISCONTINUED | OUTPATIENT
Start: 2024-11-05 | End: 2024-11-07

## 2024-11-05 RX ADMIN — Medication 10 MILLIGRAM(S): at 21:02

## 2024-11-05 RX ADMIN — VALPROIC ACID 52.5 MILLIGRAM(S): 250 SOLUTION ORAL at 05:03

## 2024-11-05 RX ADMIN — Medication 2 TABLET(S): at 21:03

## 2024-11-05 RX ADMIN — IPRATROPIUM BROMIDE AND ALBUTEROL SULFATE 3 MILLILITER(S): .5; 2.5 SOLUTION RESPIRATORY (INHALATION) at 00:30

## 2024-11-05 RX ADMIN — PIPERACILLIN AND TAZOBACTAM 25 GRAM(S): .5; 4 INJECTION, POWDER, LYOPHILIZED, FOR SOLUTION INTRAVENOUS at 13:02

## 2024-11-05 RX ADMIN — Medication 100 MILLIGRAM(S): at 11:27

## 2024-11-05 RX ADMIN — Medication 2 MILLIGRAM(S): at 21:02

## 2024-11-05 RX ADMIN — VALPROIC ACID 52.5 MILLIGRAM(S): 250 SOLUTION ORAL at 22:31

## 2024-11-05 RX ADMIN — POLYETHYLENE GLYCOL 3350 17 GRAM(S): 17 POWDER, FOR SOLUTION ORAL at 18:25

## 2024-11-05 RX ADMIN — Medication 15 MILLILITER(S): at 11:27

## 2024-11-05 RX ADMIN — TRAZODONE HYDROCHLORIDE 100 MILLIGRAM(S): 100 TABLET ORAL at 21:02

## 2024-11-05 RX ADMIN — QUETIAPINE FUMARATE 50 MILLIGRAM(S): 200 TABLET ORAL at 11:27

## 2024-11-05 RX ADMIN — PIPERACILLIN AND TAZOBACTAM 25 GRAM(S): .5; 4 INJECTION, POWDER, LYOPHILIZED, FOR SOLUTION INTRAVENOUS at 21:02

## 2024-11-05 RX ADMIN — HEPARIN SODIUM 5000 UNIT(S): 10000 INJECTION INTRAVENOUS; SUBCUTANEOUS at 21:02

## 2024-11-05 RX ADMIN — PIPERACILLIN AND TAZOBACTAM 25 GRAM(S): .5; 4 INJECTION, POWDER, LYOPHILIZED, FOR SOLUTION INTRAVENOUS at 05:03

## 2024-11-05 RX ADMIN — MEMANTINE HYDROCHLORIDE 5 MILLIGRAM(S): 21 CAPSULE, EXTENDED RELEASE ORAL at 18:25

## 2024-11-05 RX ADMIN — Medication 5 MILLIGRAM(S): at 15:39

## 2024-11-05 RX ADMIN — MEMANTINE HYDROCHLORIDE 5 MILLIGRAM(S): 21 CAPSULE, EXTENDED RELEASE ORAL at 05:03

## 2024-11-05 RX ADMIN — HEPARIN SODIUM 5000 UNIT(S): 10000 INJECTION INTRAVENOUS; SUBCUTANEOUS at 13:02

## 2024-11-05 RX ADMIN — VALPROIC ACID 52.5 MILLIGRAM(S): 250 SOLUTION ORAL at 13:02

## 2024-11-05 RX ADMIN — HEPARIN SODIUM 5000 UNIT(S): 10000 INJECTION INTRAVENOUS; SUBCUTANEOUS at 05:03

## 2024-11-05 NOTE — SWALLOW BEDSIDE ASSESSMENT ADULT - PHARYNGEAL PHASE
Inconsistently required cue from SLP to initiate swallow trigger. No s/s penetration/aspiration observed/Delayed pharyngeal swallow/Decreased laryngeal elevation

## 2024-11-05 NOTE — SWALLOW BEDSIDE ASSESSMENT ADULT - SWALLOW EVAL: CURRENT DIET
NPO/NGT NPO/NGT; Pt initially passed dysphagia screen 11/2/24 and was started on a puree diet. Per discussion with IGOR [place], pt has since been made NPO/NGT 2/2 holding PO in oral cavity sans swallowing.

## 2024-11-05 NOTE — SWALLOW BEDSIDE ASSESSMENT ADULT - ADDITIONAL RECOMMENDATIONS
LTG: Pt will tolerate least restrictive diet with no overt s/s penetration/aspiration.    This service will continue to follow for swallow remediation during hospitalization

## 2024-11-05 NOTE — DISCHARGE NOTE PROVIDER - NSDCCPCAREPLAN_GEN_ALL_CORE_FT
PRINCIPAL DISCHARGE DIAGNOSIS  Diagnosis: Subdural hemorrhage  Assessment and Plan of Treatment: Admitted 11/2/24 for Left Subdural  Procedure 11/2/24 for Left craniotomy for Left subdural evacuation with Subgaleal drain placement with Dr. Godfrey  You have been started on a new medication, Valproate Acid.  Valproate helps control and prevent seizures.  The most common side effects include diarrhea or constipation, excessive sleepiness, irritability and mood changes.  Rare and sometimes serious side effects include rash.  Please make all necessary appointments and follow up with Dr. Godfrey in 1-2 weeks.   Please DO NOT take any Aspirin and NSAIDs (Advil, Aleve, Motrin, Ibuprofen) until cleared by your Neurosurgeon. Please DO NOT do any heavy lifting, bending, twisting and straining.   You have surgical staples, they will be removed on follow up with your Dr. Godfrey.   You may shower (only USE NON-HARSH OR BABY SHAMPOO). Let the water run over the incision but do not scrub the incision.  DO NOT do any scrubbing. Pat dry only.   Please come to the emergency room for any of the following: altered mental status, seizures, pain uncontrolled by pain medications, fevers, leaking / bleeding from surgical site, chest pain and shortness of breath.        SECONDARY DISCHARGE DIAGNOSES  Diagnosis: Dementia  Assessment and Plan of Treatment: Please follow with your PCP in 2 weeks     PRINCIPAL DISCHARGE DIAGNOSIS  Diagnosis: Subdural hemorrhage  Assessment and Plan of Treatment: Admitted 11/2/24 for Left Subdural  Procedure 11/2/24 for Left craniotomy for Left subdural evacuation with Subgaleal drain placement with Dr. Godfrey  You have been started on a new medication, Valproate Acid.  Valproate helps control and prevent seizures.  The most common side effects include diarrhea or constipation, excessive sleepiness, irritability and mood changes.  Rare and sometimes serious side effects include rash.  ***VPA can be discontinued on 11/1, a total course of 7 days******  Please make all necessary appointments and follow up with Dr. Godfrey in 1-2 weeks.   Please DO NOT take any Aspirin and NSAIDs (Advil, Aleve, Motrin, Ibuprofen) until cleared by your Neurosurgeon. Please DO NOT do any heavy lifting, bending, twisting and straining.   You have surgical staples, they will be removed on follow up with your Dr. Godfrey.   You may shower (only USE NON-HARSH OR BABY SHAMPOO). Let the water run over the incision but do not scrub the incision.  DO NOT do any scrubbing. Pat dry only.   Please come to the emergency room for any of the following: altered mental status, seizures, pain uncontrolled by pain medications, fevers, leaking / bleeding from surgical site, chest pain and shortness of breath.        SECONDARY DISCHARGE DIAGNOSES  Diagnosis: Cardiomyopathy  Assessment and Plan of Treatment: You were started on metoprolol XL 25mg daily per GDMT heart failure.  Please follow up with Cardiologist  in 2 week; you may call the office to make an appointment at your earliest convenience.      Diagnosis: Dementia  Assessment and Plan of Treatment: Please follow with your PCP in 2 weeks  Please continue home medications.  Follow-up with your primary care doctor as needed.      Diagnosis: Hypertension  Assessment and Plan of Treatment: Please continue home medications.  Follow-up with your primary care doctor as needed.      Diagnosis: Hyperlipidemia  Assessment and Plan of Treatment: Please continue home medications.  Follow-up with your primary care doctor as needed.      Diagnosis: On deep vein thrombosis (DVT) prophylaxis  Assessment and Plan of Treatment:      PRINCIPAL DISCHARGE DIAGNOSIS  Diagnosis: Subdural hemorrhage  Assessment and Plan of Treatment: Admitted 11/2/24 for Left Subdural  Procedure 11/2/24 for Left craniotomy for Left subdural evacuation with Subgaleal drain placement with Dr. Godfrey  You have been started on a new medication, Valproate Acid.  Valproate helps control and prevent seizures.  The most common side effects include diarrhea or constipation, excessive sleepiness, irritability and mood changes.  Rare and sometimes serious side effects include rash.  ***VPA can be discontinued on 11/1, a total course of 7 days******  **Staples from Left scalp region can be removed on 11/14/2024, if incision looks clean, dry and well healed*****  Please make all necessary appointments and follow up with Dr. Godfrey in 1-2 weeks. Please call the contact number of  for with any questions  Please DO NOT take any Aspirin and NSAIDs (Advil, Aleve, Motrin, Ibuprofen) until cleared by your Neurosurgeon. Please DO NOT do any heavy lifting, bending, twisting and straining.   You have surgical staples, they will be removed on follow up with your Dr. Godfrey.   You may shower (only USE NON-HARSH OR BABY SHAMPOO). Let the water run over the incision but do not scrub the incision.  DO NOT do any scrubbing. Pat dry only.   Please come to the emergency room for any of the following: altered mental status, seizures, pain uncontrolled by pain medications, fevers, leaking / bleeding from surgical site, chest pain and shortness of breath.        SECONDARY DISCHARGE DIAGNOSES  Diagnosis: Cardiomyopathy  Assessment and Plan of Treatment: You were started on metoprolol XL 25mg daily per GDMT heart failure.  Please follow up with Cardiologist  in 2 week; you may call the office to make an appointment at your earliest convenience.      Diagnosis: Dementia  Assessment and Plan of Treatment: Please follow with your PCP in 2 weeks  Please continue home medications.  Follow-up with your primary care doctor as needed.      Diagnosis: Hypertension  Assessment and Plan of Treatment: Please continue home medications.  Follow-up with your primary care doctor as needed.      Diagnosis: Hyperlipidemia  Assessment and Plan of Treatment: Please continue home medications.  Follow-up with your primary care doctor as needed.      Diagnosis: On deep vein thrombosis (DVT) prophylaxis  Assessment and Plan of Treatment:

## 2024-11-05 NOTE — PROGRESS NOTE ADULT - ATTENDING COMMENTS
large left chronic subacute acute subdural hematoma with midline shift and brain compression, s/p Craniotomy, supratentorial, for subdural hematoma evacuation, CT head today stable,  d/c subgaleal drain today, agitated, seroquel 50 mg  and trazodone at night, 1 to 1 ,  neuro exam confused agitated , refusing to be examined, RADHA , intact EOM, at least antigravity in all 4. neuro checks q 4 hr valproic acid for seizure prophylaxis total of 7 days,on  NC, chest xray mild improved congestion pending offical read, s/p lasix yesterday , now -900 mL, will give lasix 10 mg iv x 1 today  IVL,  TTE mild-mod decreased EF with regional wall abnormality, consult cardiology,  NG tube, start TF, last BM on 11/2,    senna and miralax   ( was on aspirin 81 mg daily at home to be cleared by NS ) , history of UTI/pyelonephritis 2/2 L hydroureteronephrosis s/p emergent cystoscoy and left ureteral stent placement and treated w/ abx treated in aug 2024. CT abd pelvis improved hydronephrosis , urine cx at PV negative was  on ceftriaxone, now on zosyn day 3 , SBP goal >110 mmhg, ECG monitor qtc  ,  LE dopplers neg for DVT, heparin sc for chemorpophylaxis , urinary retention, striaght cath as needed, bladder scan q 6 hr , send UA   not critical   moderate complex medical decision.
89[facility]  POD 3 s/p burrhole for SDH evac  SD drain d/c'ed  off precedex  vitals reviewed, afebrile  2L NC--wean off  labs reviewed  [facility] 11/4 reviewed  zosyn 5d course for aspiration precaution   trazadone at bedtime  VPA 250mg Q8 x7d   Q4 checks  delirium precaution, cont antipsychotics  PT/OT OOB  pain control  no sedation  1:1  -180; labetalol prn for SBP>180  RA  s/s reeval  NGT glucerna  LBM PTA, senna/miralax bid  DNR/DNI, may need GOC re discussion with NGT  SQH ppx  monitor for fevers  eulgycemia  not critically ill but medically complex 55min

## 2024-11-05 NOTE — SWALLOW BEDSIDE ASSESSMENT ADULT - SWALLOW EVAL: FUNCTIONAL LEVEL AT TIME OF EVAL
Awake, AAOx1, with confusion and agitation noted. Agitation was noted to increase as this visit progressed and pt p/w increasing WOB with increasing agitation

## 2024-11-05 NOTE — PROGRESS NOTE ADULT - SUBJECTIVE AND OBJECTIVE BOX
SUMMARY:  Pt is a 89yo M with history of dementia (baseline AOx2), HTN, PVD and recent hospitalization for hydroureteronephrosis s/p L ureteral stent and antibiotics presented to [facility] with slurred speech and difficulty walking from baseline and found to have an acute on chronic L SDH (2.5cm) with MLS (1.2cm) for which he was transferred to [facility] where he underwent left craniotomy for SDH evacuation on 11/2/24. His ICU course has been notable for agitation requiring dexmedetomidine.     24 HOUR EVENTS: NAEON    EXAMINATION:   General: No acute distress  HEENT: Anicteric sclerae  Cardiac: [id number]  Lungs: Clear  Abdomen: Soft, non-tender, +BS  Extremities: No c/c/e  Skin/Incision Site: Clean, dry and intact  Neurologic: Awake, alert, fully oriented, follows commands, PERRL, VFFtc, EOMI, face symmetric, tongue midline, no drift, full strength  Neuro: A&O x 1, follows commands, PERRL, EOMI, no facial, BUE 4+/5, RUE drift, BLE 4/5    -----------------------------------------------------------------------------------------------------  ICU Vital Signs Last 24 Hrs  T(C): 36.8 (05 Nov 2024 03:00), Max: 38 (04 Nov 2024 08:00)  T(F): 98.2 (05 Nov 2024 03:00), Max: 100.4 (04 Nov 2024 08:00)  HR: 81 (05 Nov 2024 03:00) (64 - 81)  BP: 155/70 (05 Nov 2024 03:00) (113/65 - 161/76)  BP(mean): 101 (05 Nov 2024 03:00) (84 - 111)  ABP: --  ABP(mean): --  RR: 28 (05 Nov 2024 03:00) (17 - 28)  SpO2: 100% (05 Nov 2024 03:00) (96% - 100%)    O2 Parameters below as of 05 Nov 2024 00:30  Patient On (Oxygen Delivery Method): nasal cannula            I&O's Summary    04 Nov 2024 07:01  -  05 Nov 2024 07:00  --------------------------------------------------------  IN: 1890.2 mL / OUT: 1000 mL / NET: 890.2 mL        MEDICATIONS  (STANDING):  atorvastatin 10 milliGRAM(s) Oral at bedtime  chlorhexidine 4% Liquid 1 Application(s) Topical daily  dexMEDEtomidine Infusion 0.2 MICROgram(s)/kG/Hr (3.74 mL/Hr) IV Continuous <Continuous>  doxazosin 2 milliGRAM(s) Oral at bedtime  heparin   Injectable 5000 Unit(s) SubCutaneous every 8 hours  memantine 5 milliGRAM(s) Oral two times a day  multivitamin/minerals/iron Oral Solution (CENTRUM) 15 milliLiter(s) Oral daily  piperacillin/tazobactam IVPB.. 3.375 Gram(s) IV Intermittent every 8 hours  polyethylene glycol 3350 17 Gram(s) Oral daily  QUEtiapine 50 milliGRAM(s) Oral daily  senna 2 Tablet(s) Oral at bedtime  thiamine Injectable 100 milliGRAM(s) IV Push daily  traZODone 100 milliGRAM(s) Oral <User Schedule>  valproate sodium   IVPB 250 milliGRAM(s) IV Intermittent every 8 hours      RESPIRATORY:      IMAGING:   Recent imaging studies were reviewed.    LAB RESULTS:                          8.2    6.18  )-----------( 163      ( 04 Nov 2024 00:28 )             26.0           11-04    139  |  108  |  33[H]  ----------------------------<  82  4.0   |  22  |  0.92    Ca    7.8[L]      04 Nov 2024 00:22  Phos  2.7     11-04  Mg     2.2     11-04                  -----------------------------------------------------------------------------------------------------------------------------------------------------------------------------------               SUMMARY:  Pt is a 89yo M with history of dementia (baseline AOx2), HTN, PVD and recent hospitalization for hydroureteronephrosis s/p L ureteral stent and antibiotics presented to [facility] with slurred speech and difficulty walking from baseline and found to have an acute on chronic L SDH (2.5cm) with MLS (1.2cm) for which he was transferred to [facility] where he underwent left craniotomy for SDH evacuation on 11/2/24. His ICU course has been notable for agitation requiring dexmedetomidine.     24 HOUR EVENTS: NAEON    EXAMINATION:   General: No acute distress  HEENT: Anicteric sclerae  Cardiac: [id number]  Lungs: Clear  Abdomen: Soft, non-tender, +BS  Extremities: No c/c/e  Skin/Incision Site: Clean, dry and intact  Neuro: A&O x 1, follows commands, PERRL, EOMI, no facial, BUE 4+/5, RUE drift, BLE 4/5    -----------------------------------------------------------------------------------------------------  ICU Vital Signs Last 24 Hrs  T(C): 36.8 (05 Nov 2024 03:00), Max: 38 (04 Nov 2024 08:00)  T(F): 98.2 (05 Nov 2024 03:00), Max: 100.4 (04 Nov 2024 08:00)  HR: 81 (05 Nov 2024 03:00) (64 - 81)  BP: 155/70 (05 Nov 2024 03:00) (113/65 - 161/76)  BP(mean): 101 (05 Nov 2024 03:00) (84 - 111)  ABP: --  ABP(mean): --  RR: 28 (05 Nov 2024 03:00) (17 - 28)  SpO2: 100% (05 Nov 2024 03:00) (96% - 100%)    O2 Parameters below as of 05 Nov 2024 00:30  Patient On (Oxygen Delivery Method): nasal cannula            I&O's Summary    04 Nov 2024 07:01  -  05 Nov 2024 07:00  --------------------------------------------------------  IN: 1890.2 mL / OUT: 1000 mL / NET: 890.2 mL        MEDICATIONS  (STANDING):  atorvastatin 10 milliGRAM(s) Oral at bedtime  chlorhexidine 4% Liquid 1 Application(s) Topical daily  dexMEDEtomidine Infusion 0.2 MICROgram(s)/kG/Hr (3.74 mL/Hr) IV Continuous <Continuous>  doxazosin 2 milliGRAM(s) Oral at bedtime  heparin   Injectable 5000 Unit(s) SubCutaneous every 8 hours  memantine 5 milliGRAM(s) Oral two times a day  multivitamin/minerals/iron Oral Solution (CENTRUM) 15 milliLiter(s) Oral daily  piperacillin/tazobactam IVPB.. 3.375 Gram(s) IV Intermittent every 8 hours  polyethylene glycol 3350 17 Gram(s) Oral daily  QUEtiapine 50 milliGRAM(s) Oral daily  senna 2 Tablet(s) Oral at bedtime  thiamine Injectable 100 milliGRAM(s) IV Push daily  traZODone 100 milliGRAM(s) Oral <User Schedule>  valproate sodium   IVPB 250 milliGRAM(s) IV Intermittent every 8 hours      RESPIRATORY:      IMAGING:   Recent imaging studies were reviewed.    LAB RESULTS:                          8.2    6.18  )-----------( 163      ( 04 Nov 2024 00:28 )             26.0           11-04    139  |  108  |  33[H]  ----------------------------<  82  4.0   |  22  |  0.92    Ca    7.8[L]      04 Nov 2024 00:22  Phos  2.7     11-04  Mg     2.2     11-04                  -----------------------------------------------------------------------------------------------------------------------------------------------------------------------------------

## 2024-11-05 NOTE — DISCHARGE NOTE PROVIDER - CARE PROVIDERS DIRECT ADDRESSES
[email or web address]

## 2024-11-05 NOTE — PROGRESS NOTE ADULT - SUBJECTIVE AND OBJECTIVE BOX
Patient seen and examined at bedside.    --Anticoagulation--  heparin   Injectable 5000 Unit(s) SubCutaneous every 8 hours    T(C): 36.4 (11-04-24 @ 19:00), Max: 38 (11-04-24 @ 04:00)  HR: 76 (11-05-24 @ 00:30) (65 - 81)  BP: 145/69 (11-04-24 @ 19:00) (113/65 - 151/60)  RR: 24 (11-04-24 @ 23:00) (17 - 28)  SpO2: 100% (11-05-24 @ 00:30) (96% - 100%)  Wt(kg): --    Exam: Confused, Ox1, EOS, PERRL, EOMI, no facial, FC, BUE 4+/5, RUE drift, BLE 4+/5

## 2024-11-05 NOTE — SWALLOW BEDSIDE ASSESSMENT ADULT - SLP GENERAL OBSERVATIONS
Pt received upright ~45 degrees in bed, AAOx1, and was repositioned upright for this evaluation with assistance from RN. NGT in place. O2 99% on nasal cannula. Pt with bilateral wrist restraints. Per discussion with IGOR Loomis prior to evaluation, pt agitated at baseline and recently received Seroquel prior to this evaluation. Pt p/w tangental speech and required redirection from SLP to engage in conversation and to orient to the task of swallowing. Pt noted with WOB upon arrival, which was noted to worsen as pt became more agitated throughout this visit. Pt declined to accept more than ice chips x3 with requesting to go to the bathroom. RN and 1:1 at bedside to assist pt.

## 2024-11-05 NOTE — DISCHARGE NOTE PROVIDER - NSDCMRMEDTOKEN_GEN_ALL_CORE_FT
amoxicillin 500 mg oral capsule: 1 cap(s) orally 2 times a day  Cerovite Senior oral tablet: 1 tab(s) orally once a day  docusate sodium 100 mg oral capsule: 1 cap(s) orally once a day  doxazosin 2 mg oral tablet: 1 tab(s) orally once a day (at bedtime)  heparin: 5,000 unit(s) subcutaneous every 8 hours  lactobacillus acidophilus oral tablet: 2 tab(s) orally once a day  memantine 5 mg oral tablet: 1 tab(s) orally once a day  polyethylene glycol 3350 oral powder for reconstitution: 17 gram(s) orally once a day  PreserVision AREDS oral capsule: 1 cap(s) orally once a day  QUEtiapine 25 mg oral tablet: 1 tab(s) orally once a day  senna (sennosides) 8.6 mg oral tablet: 1 tab(s) orally once a day  simvastatin 20 mg oral tablet: 1 tab(s) orally once a day  tamsulosin 0.4 mg oral capsule: 1 cap(s) orally once a day (at bedtime)  traZODone 100 mg oral tablet: 1 tab(s) orally once a day   Cerovite Senior oral tablet: 1 tab(s) orally once a day  docusate sodium 100 mg oral capsule: 1 cap(s) orally once a day  doxazosin 2 mg oral tablet: 1 tab(s) orally once a day (at bedtime)  heparin: 5,000 unit(s) subcutaneous every 8 hours  lactobacillus acidophilus oral tablet: 2 tab(s) orally once a day  memantine 5 mg oral tablet: 1 tab(s) orally once a day  metoprolol succinate 25 mg oral tablet, extended release: 1 tab(s) orally once a day  PreserVision AREDS oral capsule: 1 cap(s) orally once a day  QUEtiapine 25 mg oral tablet: 1 tab(s) orally once a day  simvastatin 20 mg oral tablet: 1 tab(s) orally once a day  tamsulosin 0.4 mg oral capsule: 1 cap(s) orally once a day (at bedtime)  traZODone 100 mg oral tablet: 1 tab(s) orally once a day  valproic acid 250 mg oral capsule: 1 cap(s) orally every 8 hours   Cerovite Senior oral tablet: 1 tab(s) orally once a day  docusate sodium 100 mg oral capsule: 1 cap(s) orally once a day  doxazosin 2 mg oral tablet: 1 tab(s) orally once a day (at bedtime)  heparin: 5,000 unit(s) subcutaneous every 8 hours  lactobacillus acidophilus oral tablet: 2 tab(s) orally once a day  memantine 5 mg oral tablet: 1 tab(s) orally once a day  metoprolol succinate 25 mg oral tablet, extended release: 1 tab(s) orally once a day  PreserVision AREDS oral capsule: 1 cap(s) orally once a day  simvastatin 20 mg oral tablet: 1 tab(s) orally once a day  traZODone 50 mg oral tablet: 50 milligram(s) orally once a day  valproic acid 250 mg oral capsule: 1 cap(s) orally every 8 hours

## 2024-11-05 NOTE — DISCHARGE NOTE PROVIDER - NSDCFUSCHEDAPPT_GEN_ALL_CORE_FT
Ulises Godfrey  [facility] Physician [facility]  NEUROSURG [address] [place] [address]  Scheduled Appointment: 11/21/2024

## 2024-11-05 NOTE — DISCHARGE NOTE PROVIDER - HOSPITAL COURSE
89M on ASA81, with past medical history dementia (Ox2 baseline), hypertension, PVD, recent hospitalization August 2024 for UTI/pyelonephritis secondary to left hydroureteronephrosis s/p emergent cystoscoy and left ureteral stent placement and treated with antibiotics. Presented to [facility] on 11/2/24 for altered mental statues, speech slurring, and difficulty walking. CT of the head showed chronic Left subdural (SDH) (~2.5cm w/ 1.2cm MLS) with small acute component; new since August 2024 CT of the head. Patient was then transferred to [facility] for neurosurgery evluation. Patient was admitted to neurosurgery service under Dr. Godfrey. On 11/2/2024 patient underwent a Left craniotomy for subdural evacuation with placement of a subgaleal drain with Dr. Godfrey. Post operation patient was monitored in the neurosurgery intensive care unit. Post operation CT of the head showed good evacuation. During hospital course patient had pulmonary edema which resolved with lasix and increase in agitation. Patient was reoriented, placed on a 1:1 observation for safety and started on medication to help decrease agitation. Patient was also found to have a urinary tract infection in which he completed a course of Zosyn.  On 11/4/24 patient subgaleal drain was removed and post pull CT of the head was stable. Physical therapy and Occupational Therapy recommended Sub-acute Rehab. On day of discharge, patient was hemodynamically stable and neurologically stable.   89M on ASA81, with past medical history dementia (Ox2 baseline), hypertension, PVD, recent hospitalization August 2024 for UTI/pyelonephritis secondary to left hydroureteronephrosis s/p emergent cystoscoy and left ureteral stent placement and treated with antibiotics. Presented to [facility] on 11/2/24 for altered mental statues, speech slurring, and difficulty walking. CT of the head showed chronic Left subdural (SDH) (~2.5cm w/ 1.2cm MLS) with small acute component; new since August 2024 CT of the head. Patient was then transferred to [facility] for neurosurgery evluation. Patient was admitted to neurosurgery service under Dr. Godfrey. On 11/2/2024 patient underwent a Left craniotomy for subdural evacuation with placement of a subgaleal drain with Dr. Godfrey. Post operation patient was monitored in the neurosurgery intensive care unit. Post operation CT of the head showed good evacuation. During hospital course patient had pulmonary edema which resolved with lasix and increase in agitation. Patient was reoriented, placed on a 1:1 observation for safety and started on medication to help decrease agitation. Patient was also found to have a urinary tract infection in which he completed a course of Zosyn.  On 11/4/24 patient subgaleal drain was removed and post pull CT of the head was stable. Pt was also given lasix on the floor, had tachypnea, resolved. Pt is stable and awake.        Physical therapy and Occupational Therapy recommended Sub-acute Rehab. On day of discharge, patient was hemodynamically stable and neurologically stable.

## 2024-11-05 NOTE — DISCHARGE NOTE PROVIDER - CARE PROVIDER_API CALL
Ulises Godfrey  [address] [address]  Phone: [phone]  Fax: [phone]  Follow Up Time: 1 week   Ulises Godfrey  Neurosurgery  [address] [place] [address]  Phone: [phone]  Fax: [phone]  Follow Up Time: 1 week    Rolando Cota  [address], [facility] [address]  [place] [address]  Phone: [phone]  Fax: [phone]  Follow Up Time:

## 2024-11-05 NOTE — PHYSICAL THERAPY INITIAL EVALUATION ADULT - ADDITIONAL COMMENTS
pt is poor historian, per EMR, pt from [facility] memory care unit, ambulatory at baseline and some assist with ADLs. Has RW and wheelchair.

## 2024-11-05 NOTE — PROGRESS NOTE ADULT - ASSESSMENT
89yo M with history of dementia (baseline AOx2), HTN, PVD and recent hospitalization for hydroureteronephrosis s/p L ureteral stent and antibiotics presented to [facility] with slurred speech and difficulty walking from baseline and found to have an acute on chronic L SDH (2.5cm) with MLS (1.2cm) for which he was transferred to [facility] where he underwent left craniotomy for SDH evacuation on 11/2/24.

## 2024-11-05 NOTE — PHYSICAL THERAPY INITIAL EVALUATION ADULT - PERTINENT HX OF CURRENT PROBLEM, REHAB EVAL
89yo M with history of dementia (baseline AOx2), HTN, PVD and recent hospitalization for hydroureteronephrosis s/p L ureteral stent and antibiotics presented to [facility] with slurred speech and difficulty walking from baseline and found to have an acute on chronic L SDH (2.5cm) with MLS (1.2cm) for which he was transferred to [facility] where he underwent left craniotomy for SDH evacuation on 11/2/24. His ICU course has been notable for agitation requiring dexmedetomidine.

## 2024-11-05 NOTE — PHYSICAL THERAPY INITIAL EVALUATION ADULT - TRANSFER TRAINING, PT EVAL
GOAL: Patient will perform sit to stand transfers independently with least restrictive assistive device and proper hand placement

## 2024-11-05 NOTE — PROGRESS NOTE ADULT - ASSESSMENT
ASSESSMENT/PLAN: SDH, POD 2    NEURO:  Neurochecks q2h  subgaleal drain in place; monitor output  on 1:1 for safety  delirium precautions  increase quetiapine as needed and as tolerated based on Qtc  VPA for behavior and seizure ppx end date: 11/10/2024  Seroquel 50 qAM  Trazodone 100 at night  restart Memantine 5 BID  titrate Precedex as tolerated  activity: mobilize as tolerated    CV:  ECHO: regional wall abnormalities  EKG to obtain Qtc    SBP goal: 100-140mmHg    PULM:  incentive spirometry as tolerated    RENAL:  PRN electrolyte repletion    GI:  diet: CCD puree, given poor PO intake 2/2   bowel regimen     ENDO:   Goal euglycemia (-180)    HEME/ONC:  VTE prophylaxis: SCDs, chemoppx per NSGY    ID:  Pneumonia  c/w Zosyn for total 5d, last day: 11/06/2024 ASSESSMENT/PLAN: SDH, POD 2    NEURO:  Neurochecks q4h  subgaleal drain in place; monitor output  on 1:1 for safety  delirium precautions  VPA for behavior and seizure ppx end date: 11/10/2024  Seroquel 50 qAM  Trazodone 100 at night  restart Memantine 5 BID  d/c precedex  titrate Precedex as tolerated  activity: mobilize as tolerated    CV:  SBP: 100-180  ECHO: regional wall abnormalities  EKG to obtain Qtc    SBP goal: 100-140mmHg    PULM:  incentive spirometry as tolerated    RENAL:  IVL  PRN electrolyte repletion    GI:  diet: tube feeds, glucerna   SLP re-eval  bowel regimen     ENDO:   Goal euglycemia (-180)    HEME/ONC:  VTE prophylaxis: SCDs, Hep 5k q8h    ID:  Pneumonia  c/w Zosyn for total 5d, last day: 11/06/2024    Dispo: floor

## 2024-11-05 NOTE — SWALLOW BEDSIDE ASSESSMENT ADULT - SLP PERTINENT HISTORY OF CURRENT PROBLEM
NSICU recs: "Neurochecks q2h, subgaleal drain in place; monitor output, on 1:1 for safety, delirium precautions, increase quetiapine as needed and as tolerated based on Qtc, VPA for behavior and seizure ppx end date: 11/10/2024"

## 2024-11-05 NOTE — SWALLOW BEDSIDE ASSESSMENT ADULT - SWALLOW EVAL: DIAGNOSIS
89YOM p/f AMS, speech slurring, and difficulty walking found to have CTH w/ chronic L SDH p/w suspected oropharyngeal dysphagia, c/b mental status. Oral phase of swallow marked by reduced bolus manipulation and delayed AP transit. Pharyngeal swallow suspected to be delayed with reduced hyolaryngeal elevation/excursion upon palpation. No overt s/s penetration/aspiration observed. However, pt p/w increasing agitation as this visit progressed and declined to accept any further PO trials.

## 2024-11-05 NOTE — SWALLOW BEDSIDE ASSESSMENT ADULT - COMMENTS
Chest XR 11/3: "Enteric tube is noted with the distal tip at the level of the GE junction. Repositioning is recommended. The heart is difficult to evaluate. 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 Bilateral hazy opacities, mildly decreased compared to prior."    CTH 11/4: "There has been a left frontal craniotomy. There is air in the subgaleal space. There is residual mixed density subdural collection with air unchanged since the prior exam. There is residual mass effect on the cerebral cortical sulci and the left lateral ventricle without significant midline shift to the right. Previous bilateral lens replacement surgery. No change since 11/3/2024. Left frontal craniotomy. Residual mixed density subdural collection and air."    Cardiology consulted for management and recommended "Given advanced age and present issue SDH s/p evacuation will not pursue invasive workup, i.e LHC. PRN lasix"    Neurosx following with rec's to "D/c drain in AM" and "Wean off precedex."

## 2024-11-05 NOTE — DISCHARGE NOTE PROVIDER - PROVIDER TOKENS
PROVIDER:[TOKEN:[[id number]],FOLLOWUP:[1 week]] PROVIDER:[TOKEN:[[id number]],FOLLOWUP:[1 week]],PROVIDER:[TOKEN:[[id number]]]

## 2024-11-05 NOTE — PROGRESS NOTE ADULT - SUBJECTIVE AND OBJECTIVE BOX
Subjective: Patient seen and examined. No new events except as noted.   remains iN NSCU       REVIEW OF SYSTEMS:    CONSTITUTIONAL: + weakness, fevers or chills  EYES/ENT: No visual changes;  No vertigo or throat pain   NECK: No pain or stiffness  RESPIRATORY: No cough, wheezing, hemoptysis; No shortness of breath  CARDIOVASCULAR: No chest pain or palpitations  GASTROINTESTINAL: No abdominal or epigastric pain. No nausea, vomiting, or hematemesis; No diarrhea or constipation. No melena or hematochezia.  GENITOURINARY: No dysuria, frequency or hematuria  NEUROLOGICAL: No numbness or weakness  SKIN: No itching, burning, rashes, or lesions   All other review of systems is negative unless indicated above.    MEDICATIONS:  MEDICATIONS  (STANDING):  atorvastatin 10 milliGRAM(s) Oral at bedtime  chlorhexidine 4% Liquid 1 Application(s) Topical daily  dexMEDEtomidine Infusion 0.2 MICROgram(s)/kG/Hr (3.74 mL/Hr) IV Continuous <Continuous>  doxazosin 2 milliGRAM(s) Oral at bedtime  heparin   Injectable 5000 Unit(s) SubCutaneous every 8 hours  memantine 5 milliGRAM(s) Oral two times a day  multivitamin/minerals/iron Oral Solution (CENTRUM) 15 milliLiter(s) Oral daily  piperacillin/tazobactam IVPB.. 3.375 Gram(s) IV Intermittent every 8 hours  polyethylene glycol 3350 17 Gram(s) Oral daily  QUEtiapine 50 milliGRAM(s) Oral daily  senna 2 Tablet(s) Oral at bedtime  thiamine Injectable 100 milliGRAM(s) IV Push daily  traZODone 100 milliGRAM(s) Oral <User Schedule>  valproate sodium   IVPB 250 milliGRAM(s) IV Intermittent every 8 hours      PHYSICAL EXAM:  T(C): 36.6 (11-05-24 @ 07:00), Max: 38 (11-04-24 @ 10:00)  HR: 82 (11-05-24 @ 07:00) (64 - 82)  BP: 158/76 (11-05-24 @ 07:00) (113/65 - 161/76)  RR: 20 (11-05-24 @ 07:00) (17 - 28)  SpO2: 99% (11-05-24 @ 07:00) (96% - 100%)  Wt(kg): --  I&O's Summary    04 Nov 2024 07:01  -  05 Nov 2024 07:00  --------------------------------------------------------  IN: 1887.4 mL / OUT: 1000 mL / NET: 887.4 mL    05 Nov 2024 07:01  -  05 Nov 2024 09:55  --------------------------------------------------------  IN: 250 mL / OUT: 0 mL / NET: 250 mL          Appearance: NAD	+NGT   HEENT:  Dry  oral mucosa, PERRL, EOMI	  Lymphatic: No lymphadenopathy  Cardiovascular: Normal S1 S2, No JVD, +murmurs, No edema  Respiratory: Lungs clear to auscultation	  Psychiatry: A & O x 1  Gastrointestinal:  Soft, Non-tender, + BS	  Skin: No rashes, No ecchymoses, No cyanosis	  Neurologic: Awake, alert, fully oriented, follows commands, PERRL, VFFtc, EOMI, face symmetric, tongue midline, no drift, full strength  Neuro: A&O x 1, follows commands with coaxing, PERRL, EOMI, no facial, BUE 4+/5, RUE drift, BLE 4/5    Extremities: Normal range of motion, No clubbing, cyanosis or edema  Vascular: Peripheral pulses palpable 2+ bilaterally      LABS:    CARDIAC MARKERS:                                8.2    6.18  )-----------( 163      ( 04 Nov 2024 00:28 )             26.0     11-04    139  |  108  |  33[H]  ----------------------------<  82  4.0   |  22  |  0.92    Ca    7.8[L]      04 Nov 2024 00:22  Phos  2.7     11-04  Mg     2.2     11-04          TELEMETRY: 	  SR  ECG:  	  RADIOLOGY:   DIAGNOSTIC TESTING:  [ ] Echocardiogram:  [ ]  Catheterization:  [ ] Stress Test:    OTHER:

## 2024-11-05 NOTE — SWALLOW BEDSIDE ASSESSMENT ADULT - H & P REVIEW
89M on ASA81, hx dementia (Ox2 baseline), HTN, PVD, recent hospitalization August 2024 for UTI/pyelonephritis 2/2 L hydroureteronephrosis s/p emergent cystoscoy and left ureteral stent placement and treated w/ abx. Today, p/f AMS, speech slurring, and difficulty walking. CTH w/ chronic L SDH (~2.5cm w/ 1.2cm MLS) w/ small acute component; new since August 2024 CTH. Briefly intubated for emergent minicrani for "L acute on chronic SDH with MLS" on 11/2 with extubation following procedure. His ICU course has been notable for agitation requiring dexmedetomidine./yes

## 2024-11-06 DIAGNOSIS — Z29.9 ENCOUNTER FOR PROPHYLACTIC MEASURES, UNSPECIFIED: ICD-10-CM

## 2024-11-06 DIAGNOSIS — R13.10 DYSPHAGIA, UNSPECIFIED: ICD-10-CM

## 2024-11-06 LAB
ANION GAP SERPL CALC-SCNC: 13 MMOL/L — SIGNIFICANT CHANGE UP (ref 5–17)
BUN SERPL-MCNC: 30 MG/DL — HIGH (ref 7–23)
CALCIUM SERPL-MCNC: 8.2 MG/DL — LOW (ref 8.4–10.5)
CHLORIDE SERPL-SCNC: 107 MMOL/L — SIGNIFICANT CHANGE UP (ref 96–108)
CO2 SERPL-SCNC: 24 MMOL/L — SIGNIFICANT CHANGE UP (ref 22–31)
CREAT SERPL-MCNC: 0.85 MG/DL — SIGNIFICANT CHANGE UP (ref 0.5–1.3)
EGFR: 79 ML/MIN/1.73M2 — SIGNIFICANT CHANGE UP
GLUCOSE SERPL-MCNC: 75 MG/DL — SIGNIFICANT CHANGE UP (ref 70–99)
HCT VFR BLD CALC: 31 % — LOW (ref 39–50)
HGB BLD-MCNC: 9.5 G/DL — LOW (ref 13–17)
MCHC RBC-ENTMCNC: 30.6 G/DL — LOW (ref 32–36)
MCHC RBC-ENTMCNC: 31.6 PG — SIGNIFICANT CHANGE UP (ref 27–34)
MCV RBC AUTO: 103 FL — HIGH (ref 80–100)
NRBC # BLD: 0 /100 WBCS — SIGNIFICANT CHANGE UP (ref 0–0)
PLATELET # BLD AUTO: 181 K/UL — SIGNIFICANT CHANGE UP (ref 150–400)
POTASSIUM SERPL-MCNC: 4 MMOL/L — SIGNIFICANT CHANGE UP (ref 3.5–5.3)
POTASSIUM SERPL-SCNC: 4 MMOL/L — SIGNIFICANT CHANGE UP (ref 3.5–5.3)
RBC # BLD: 3.01 M/UL — LOW (ref 4.2–5.8)
RBC # FLD: 16 % — HIGH (ref 10.3–14.5)
SODIUM SERPL-SCNC: 144 MMOL/L — SIGNIFICANT CHANGE UP (ref 135–145)
WBC # BLD: 6.93 K/UL — SIGNIFICANT CHANGE UP (ref 3.8–10.5)
WBC # FLD AUTO: 6.93 K/UL — SIGNIFICANT CHANGE UP (ref 3.8–10.5)

## 2024-11-06 PROCEDURE — 99223 1ST HOSP IP/OBS HIGH 75: CPT

## 2024-11-06 PROCEDURE — 93010 ELECTROCARDIOGRAM REPORT: CPT

## 2024-11-06 RX ORDER — FUROSEMIDE 40 MG
20 TABLET ORAL ONCE
Refills: 0 | Status: COMPLETED | OUTPATIENT
Start: 2024-11-06 | End: 2024-11-06

## 2024-11-06 RX ORDER — TRAZODONE HYDROCHLORIDE 100 MG/1
50 TABLET ORAL
Refills: 0 | Status: DISCONTINUED | OUTPATIENT
Start: 2024-11-06 | End: 2024-11-07

## 2024-11-06 RX ORDER — MEMANTINE HYDROCHLORIDE 21 MG/1
5 CAPSULE, EXTENDED RELEASE ORAL
Refills: 0 | Status: DISCONTINUED | OUTPATIENT
Start: 2024-11-06 | End: 2024-11-07

## 2024-11-06 RX ORDER — QUETIAPINE FUMARATE 200 MG/1
50 TABLET ORAL DAILY
Refills: 0 | Status: DISCONTINUED | OUTPATIENT
Start: 2024-11-06 | End: 2024-11-06

## 2024-11-06 RX ORDER — DOXAZOSIN MESYLATE 8 MG
2 TABLET ORAL AT BEDTIME
Refills: 0 | Status: DISCONTINUED | OUTPATIENT
Start: 2024-11-06 | End: 2024-11-07

## 2024-11-06 RX ADMIN — MEMANTINE HYDROCHLORIDE 5 MILLIGRAM(S): 21 CAPSULE, EXTENDED RELEASE ORAL at 17:04

## 2024-11-06 RX ADMIN — Medication 10 MILLIGRAM(S): at 21:53

## 2024-11-06 RX ADMIN — PIPERACILLIN AND TAZOBACTAM 25 GRAM(S): .5; 4 INJECTION, POWDER, LYOPHILIZED, FOR SOLUTION INTRAVENOUS at 13:24

## 2024-11-06 RX ADMIN — Medication 20 MILLIGRAM(S): at 17:04

## 2024-11-06 RX ADMIN — HEPARIN SODIUM 5000 UNIT(S): 10000 INJECTION INTRAVENOUS; SUBCUTANEOUS at 13:22

## 2024-11-06 RX ADMIN — PIPERACILLIN AND TAZOBACTAM 25 GRAM(S): .5; 4 INJECTION, POWDER, LYOPHILIZED, FOR SOLUTION INTRAVENOUS at 06:19

## 2024-11-06 RX ADMIN — QUETIAPINE FUMARATE 50 MILLIGRAM(S): 200 TABLET ORAL at 13:22

## 2024-11-06 RX ADMIN — HEPARIN SODIUM 5000 UNIT(S): 10000 INJECTION INTRAVENOUS; SUBCUTANEOUS at 06:19

## 2024-11-06 RX ADMIN — Medication 2 MILLIGRAM(S): at 21:53

## 2024-11-06 RX ADMIN — VALPROIC ACID 52.5 MILLIGRAM(S): 250 SOLUTION ORAL at 13:23

## 2024-11-06 RX ADMIN — Medication 2 TABLET(S): at 21:53

## 2024-11-06 RX ADMIN — HEPARIN SODIUM 5000 UNIT(S): 10000 INJECTION INTRAVENOUS; SUBCUTANEOUS at 21:53

## 2024-11-06 RX ADMIN — Medication 15 MILLILITER(S): at 13:22

## 2024-11-06 RX ADMIN — TRAZODONE HYDROCHLORIDE 50 MILLIGRAM(S): 100 TABLET ORAL at 21:53

## 2024-11-06 RX ADMIN — VALPROIC ACID 52.5 MILLIGRAM(S): 250 SOLUTION ORAL at 21:54

## 2024-11-06 RX ADMIN — VALPROIC ACID 52.5 MILLIGRAM(S): 250 SOLUTION ORAL at 06:19

## 2024-11-06 NOTE — CONSULT NOTE ADULT - ASSESSMENT
89yo M with history of dementia (baseline AOx2), HTN, PVD and recent hospitalization for hydroureteronephrosis s/p L ureteral stent and antibiotics presented to [facility] with slurred speech and difficulty walking from baseline and found to have an acute on chronic L SDH (2.5cm) with MLS (1.2cm) for which he was transferred to [facility] where he underwent left craniotomy for SDH evacuation on 11/2/24.
89M on ASA81, hx dementia (Ox2 baseline), HTN, PVD, recent hospitalization August 2024 for UTI/pyelonephritis 2/2 L hydroureteronephrosis s/p emergent cystoscoy and left ureteral stent placement and treated w/ abx. P/f AMS, speech slurring, and difficulty walking. 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 Had subgaleal drains- removed.     On Zosyn for ?aspiration.    Currently in bed, sleepy, difficult to arouse, does not follow commands. Per nurse, ate a little earlier but is difficult to feed.

## 2024-11-06 NOTE — CONSULT NOTE ADULT - PROBLEM SELECTOR RECOMMENDATION 5
On sq heparin.     H/o pyelonephritis and ureteral stent- urine cx negative     Last bm 11/5 On sq heparin.     H/o pyelonephritis and ureteral stent- urine cx negative     Last bm 11/5    DNR/DNI

## 2024-11-06 NOTE — PROGRESS NOTE ADULT - ASSESSMENT
89yo M with history of dementia (baseline AOx2), HTN, PVD and recent hospitalization for hydroureteronephrosis s/p L ureteral stent and antibiotics presented to [facility] with slurred speech and difficulty walking from baseline and found to have an acute on chronic L SDH (2.5cm) with MLS (1.2cm) for which he was transferred to [facility] where he underwent left craniotomy for SDH evacuation on 11/2/24.

## 2024-11-06 NOTE — CONSULT NOTE ADULT - PROBLEM SELECTOR RECOMMENDATION 3
On VPA 250mg q8 for mood stabilization. Also on Trazodone 100mg qhs. On VPA 250mg q8 for mood stabilization. Also on Trazodone 100mg qhs.    Continue delirium precautions. Consider decreasing Trazodone to 50mg as appears oversedated.

## 2024-11-06 NOTE — CHART NOTE - NSCHARTNOTEFT_GEN_A_CORE
89M on ASA81, hx dementia (Ox2 baseline), HTN, PVD, recent hospitalization August 2024 for UTI/pyelonephritis 2/2 L hydroureteronephrosis s/p emergent cystoscoy and left ureteral stent placement and treated w/ abx. Today, p/f AMS, speech slurring, and difficulty walking. CTH w/ chronic L SDH (~2.5cm w/ 1.2cm MLS) w/ small acute component; new since August 2024 CTH. Briefly intubated for emergent minicrani for "L acute on chronic SDH with MLS" on 11/2 with extubation following procedure. His ICU course has been notable for agitation requiring dexmedetomidine.    Pt seen for swallow re-evaluation. Aware pt is currently NPO per recs from this service on 11/5. Transfer paperwork from [facility] indicates pt consumed a regular solids/thin liquid diet PTA. Pt downgraded from ICU and was received upright in chair at recreation table, AAOx1. Pt on RA in no distress. Pt self fed ~4oz thin liquids via cup and straw and accepted trials of puree ~2oz and bite-size solid x1 from SLP. Pt declined all other solid trials despite encouragement from SLP with noted increasing agitation as SLP attempted to encourage pt to participate. Oral phase of swallow marked by prolonged mastication and piecemeal AP transit of solids. Otherwise, grossly functional bolus formation for puree and liquids with timely AP transit and good oral clearance. Pharyngeal response suspected to be delayed with hyolaryngeal elevation/excursion upon palpation. No s/s penetration/aspiration observed across trials. Of note, pt was observed to become distracted by other pt's at recreation table and by surrounding conversations, requiring redirection from SLP.    Impressions: Pt p/w suspected oropharyngeal dysphagia marked by prolonged mastication, piecemeal AP transit, and suspected delayed pharyngeal swallow response. Suspect component of presbyphagia.  No s/s penetration/aspiration observed across trials.    Given above, recommend:  1. Puree and thin liquids  2. Full assistance with PO intake  3. Limit distractions during meals (i.e. TV, conversations)  4. This service will continue to follow for swallow remediation during hospitalization.    All of the above d/w pt and IGOR Patel. Will d/w provider. This service will continue to follow.    Anna Saunders M.A. CCC-SLP Teams   Speech-Language Pathology 89M on ASA81, hx dementia (Ox2 baseline), HTN, PVD, recent hospitalization August 2024 for UTI/pyelonephritis 2/2 L hydroureteronephrosis s/p emergent cystoscoy and left ureteral stent placement and treated w/ abx. Today, p/f AMS, speech slurring, and difficulty walking. CTH w/ chronic L SDH (~2.5cm w/ 1.2cm MLS) w/ small acute component; new since August 2024 CTH. Briefly intubated for emergent minicrani for "L acute on chronic SDH with MLS" on 11/2 with extubation following procedure. His ICU course has been notable for agitation requiring dexmedetomidine.    Pt seen for swallow re-evaluation. Aware pt is currently NPO per recs from this service on 11/5. Transfer paperwork from [facility] indicates pt consumed a regular solids/thin liquid diet PTA. Pt downgraded from ICU and was received upright in chair at recreation table, AAOx1. Pt on RA in no distress. Pt communicating appropriately and following simple commands with occasional redirection from SLP. Pt self fed ~4oz thin liquids via cup and straw and accepted trials of puree ~2oz and bite-size solid x1 from SLP. Pt declined all other solid trials despite encouragement from SLP with noted increasing agitation as SLP attempted to encourage pt to participate. Oral phase of swallow marked by prolonged mastication and piecemeal AP transit of solids. Otherwise, grossly functional bolus formation for puree and liquids with timely AP transit and good oral clearance. Pharyngeal response suspected to be delayed with hyolaryngeal elevation/excursion upon palpation. No s/s penetration/aspiration observed across trials. Of note, pt was observed to become distracted by other pt's at recreation table and by surrounding conversations, requiring redirection from SLP.    Impressions: Pt p/w suspected oropharyngeal dysphagia marked by prolonged mastication, piecemeal AP transit, and suspected delayed pharyngeal swallow response. Suspect component of presbyphagia.  No s/s penetration/aspiration observed across trials.    Given above, recommend:  1. Puree and thin liquids  2. Full assistance with PO intake  3. Limit distractions during meals (i.e. TV, conversations)  4. This service will continue to follow for swallow remediation during hospitalization.    All of the above d/w pt and IGOR Patel. Will d/w provider. This service will continue to follow.    Anna Saunders M.A. CCC-SLP Teams   Speech-Language Pathology

## 2024-11-06 NOTE — CONSULT NOTE ADULT - PROBLEM SELECTOR RECOMMENDATION 2
Likely chronic  Given advanced age and present issue SDH s/p evacuation will not pursue invasive workup, i.e LHC   PRN lasix   Possibly will start GDMT pending clinical outcome
11/3 echo :   Left ventricular systolic function is mildly to moderately decreased with an ejection fraction visually estimated at 40 to 45 %. Regional wall motion abnormalities present. There is moderate (grade 2) left ventricular diastolic dysfunction, with elevated left ventricular filling pressure.    Given IV Lasix in the NSCU for pulm edema. Now improved but still appears tachypneic. Repeat 20mg iv today.    Per Cardiology- Likely chronic cardiac dysfunction. Given advanced age and present issue SDH s/p evacuation will not pursue invasive workup, i.e LHC. PRN lasix. Possibly will start GDMT pending clinical outcome.     On prn Labetalol for htn- can start low dose Metoprolol if Cardiology agreeable.

## 2024-11-06 NOTE — PROGRESS NOTE ADULT - SUBJECTIVE AND OBJECTIVE BOX
SUBJECTIVE:   Patient was seen and evaluated at bedside. Patient is resting in chair and is in no new acute distress.   OVERNIGHT EVENTS:   none   Vital Signs Last 24 Hrs  T(C): 36.4 (06 Nov 2024 08:00), Max: 36.6 (05 Nov 2024 15:00)  T(F): 97.5 (06 Nov 2024 08:00), Max: 97.9 (05 Nov 2024 20:45)  HR: 82 (06 Nov 2024 08:00) (79 - 93)  BP: 153/86 (06 Nov 2024 08:00) (153/86 - 187/82)  BP(mean): 116 (05 Nov 2024 15:15) (116 - 118)  RR: 18 (06 Nov 2024 08:00) (18 - 20)  SpO2: 97% (06 Nov 2024 08:00) (97% - 100%)    Parameters below as of 06 Nov 2024 08:00  Patient On (Oxygen Delivery Method): room air        PHYSICAL EXAM:    General: No Acute Distress     Neurological: awake alert oriented to person, wide awake, no facial weakness noted, uppers antigravity and lowers are antigravity, sensation wnl.     Pulmonary: Clear to Auscultation, No Rales, No Rhonchi, No Wheezes     Cardiovascular: S1, S2, Regular Rate and Rhythm     Gastrointestinal: Soft, Nontender, Nondistended     Incision: clean and dry    LABS:                        9.5    6.93  )-----------( 181      ( 06 Nov 2024 07:02 )             31.0    11-06    144  |  107  |  30[H]  ----------------------------<  75  4.0   |  24  |  0.85    Ca    8.2[L]      06 Nov 2024 07:03          11-05 @ 07:01  -  11-06 @ 07:00  --------------------------------------------------------  IN: 685 mL / OUT: 400 mL / NET: 285 mL      DRAINS:     MEDICATIONS:  Antibiotics:  piperacillin/tazobactam IVPB.. 3.375 Gram(s) IV Intermittent every 8 hours    Neuro:  memantine 5 milliGRAM(s) Oral two times a day  ondansetron Injectable 4 milliGRAM(s) IV Push every 6 hours PRN Nausea and/or Vomiting  QUEtiapine 50 milliGRAM(s) Oral daily  traZODone 100 milliGRAM(s) Oral <User Schedule>  valproate sodium   IVPB 250 milliGRAM(s) IV Intermittent every 8 hours    Cardiac:  doxazosin 2 milliGRAM(s) Oral at bedtime  labetalol Injectable 5 milliGRAM(s) IV Push every 6 hours PRN Systolic blood pressure > 180    Pulm:    GI/:  polyethylene glycol 3350 17 Gram(s) Oral daily  senna 2 Tablet(s) Oral at bedtime    Other:   atorvastatin 10 milliGRAM(s) Oral at bedtime  heparin   Injectable 5000 Unit(s) SubCutaneous every 8 hours  multivitamin/minerals/iron Oral Solution (CENTRUM) 15 milliLiter(s) Oral daily    DIET: [] Regular [] CCD [] Renal [] Puree [] Dysphagia [] Tube Feeds:   pureed diet   IMAGING:   ACC: [id number] EXAM:  CT BRAIN   ORDERED BY: KUSUM PARRISH     PROCEDURE DATE:  11/04/2024          INTERPRETATION:  CLINICAL INFORMATION: interval scan      5mm axial sections of the brain were obtained from base to vertex,   without the intravenous administration of contrast material. Images were   obtained on a portable CT scanner and compared with 11/3/2024.      There has been a left frontal craniotomy. There is air in the subgaleal   space. There is residual mixed density subdural collection with air   unchanged since the prior exam. There is residual mass effect on the   cerebral cortical sulci and the left lateral ventricle without   significant midline shift to the right. Previous bilateral lens   replacement surgery.    IMPRESSION: No change since 11/3/2024. Left frontal craniotomy. Residual   mixed density subdural collection and air.    --- End of Report ---            NORMA NOONAN MD; Attending Radiologist  This document has been electronically signed. Nov 4 2024  9:18AM

## 2024-11-06 NOTE — CONSULT NOTE ADULT - SUBJECTIVE AND OBJECTIVE BOX
NEUROSURGERY - HOSPITAL MEDICINE CO-MANAGEMENT INITIAL VISIT NOTE    CHIEF COMPLAINT: Patient is a 89y old  Male who presents with a chief complaint of Patient was admitted with left chronic subdural hematoma. (06 Nov 2024 11:56)      HPI: 89M on ASA81, hx dementia (Ox2 baseline), HTN, PVD, recent hospitalization August 2024 for UTI/pyelonephritis 2/2 L hydroureteronephrosis s/p emergent cystoscoy and left ureteral stent placement and treated w/ abx. P/f AMS, speech slurring, and difficulty walking. CTH w/ chronic L SDH (~2.5cm w/ 1.2cm MLS) w/ small acute component; new since August 2024     S/p left craniotomy and subdural evacuation on 11/2     Course c/b agitation, pulm edema.     11/3 echo :   Left ventricular systolic function is mildly to moderately decreased with an ejection fraction visually estimated at 40 to 45 %. Regional wall motion abnormalities present.    On Zosyn for ? aspiration        History limited due to: [ x] Dementia  [ ] Delirium  [ ] Condition    Allergies    No Known Allergies    Intolerances        HOME MEDICATIONS: [x ] Reviewed  Home Medications:  Cerovite Senior oral tablet: 1 tab(s) orally once a day (08 Aug 2024 11:39)  docusate sodium 100 mg oral capsule: 1 cap(s) orally once a day (08 Aug 2024 11:39)  memantine 5 mg oral tablet: 1 tab(s) orally once a day (08 Aug 2024 11:39)  polyethylene glycol 3350 oral powder for reconstitution: 17 gram(s) orally once a day (08 Aug 2024 11:39)  PreserVision AREDS oral capsule: 1 cap(s) orally once a day (08 Aug 2024 11:39)  QUEtiapine 25 mg oral tablet: 1 tab(s) orally once a day (14 Aug 2024 09:41)  senna (sennosides) 8.6 mg oral tablet: 1 tab(s) orally once a day (08 Aug 2024 11:39)  simvastatin 20 mg oral tablet: 1 tab(s) orally once a day (08 Aug 2024 11:39)  tamsulosin 0.4 mg oral capsule: 1 cap(s) orally once a day (at bedtime) (14 Aug 2024 09:34)  traZODone 100 mg oral tablet: 1 tab(s) orally once a day (08 Aug 2024 11:39)      MEDICATIONS  (STANDING):  atorvastatin 10 milliGRAM(s) Oral at bedtime  doxazosin 2 milliGRAM(s) Oral at bedtime  heparin   Injectable 5000 Unit(s) SubCutaneous every 8 hours  memantine 5 milliGRAM(s) Oral two times a day  multivitamin/minerals/iron Oral Solution (CENTRUM) 15 milliLiter(s) Oral daily  piperacillin/tazobactam IVPB.. 3.375 Gram(s) IV Intermittent every 8 hours  polyethylene glycol 3350 17 Gram(s) Oral daily  senna 2 Tablet(s) Oral at bedtime  traZODone 100 milliGRAM(s) Oral <User Schedule>  valproate sodium   IVPB 250 milliGRAM(s) IV Intermittent every 8 hours    MEDICATIONS  (PRN):  labetalol Injectable 5 milliGRAM(s) IV Push every 6 hours PRN Systolic blood pressure > 180  ondansetron Injectable 4 milliGRAM(s) IV Push every 6 hours PRN Nausea and/or Vomiting      PAST MEDICAL & SURGICAL HISTORY:  HLD (hyperlipidemia)      HTN (hypertension)      PVD (peripheral vascular disease)      CAD (coronary artery disease)      Atherosclerosis      Dementia      [ ] Reviewed     Functional Assessment: [ ] Independent  [x] Assistance  [ ] Total care  [ ] Non-ambulatory    SOCIAL HISTORY:  Residence: [ ] [facility]  [ ] SNF  [ x] Community  [ ] Substance abuse: None    FAMILY HISTORY:  Family history of premature CAD (Father)    REVIEW OF SYSTEMS:    [ x ] Unable to obtain due to poor mental status    PHYSICAL EXAM:    Vital Signs Last 24 Hrs  T(C): 36.6 (06 Nov 2024 11:29), Max: 36.6 (05 Nov 2024 20:45)  T(F): 97.8 (06 Nov 2024 11:29), Max: 97.9 (05 Nov 2024 20:45)  HR: 73 (06 Nov 2024 11:29) (73 - 89)  BP: 138/77 (06 Nov 2024 11:29) (138/77 - 157/95)  BP(mean): --  RR: 18 (06 Nov 2024 11:29) (18 - 18)  SpO2: 96% (06 Nov 2024 11:29) (96% - 99%)    Parameters below as of 06 Nov 2024 11:29  Patient On (Oxygen Delivery Method): room air      In bed, awake but not interactive. Unable to communicate or follow commands. RRR. Poor inspiratory effort. Abdomen tense, non-tender.     LABS:                        9.5    6.93  )-----------( 181      ( 06 Nov 2024 07:02 )             31.0     Hemoglobin: 9.5 g/dL (11-06 @ 07:02)  Hemoglobin: 8.2 g/dL (11-04 @ 00:28)  Hemoglobin: 8.1 g/dL (11-03 @ 00:40)  Hemoglobin: 8.1 g/dL (11-02 @ 12:26)  Hemoglobin: 8.8 g/dL (11-02 @ 04:51)  Hemoglobin: 10.1 g/dL (11-02 @ 01:25)    11-06    144  |  107  |  30[H]  ----------------------------<  75  4.0   |  24  |  0.85    Ca    8.2[L]      06 Nov 2024 07:03        Urinalysis Basic - ( 06 Nov 2024 07:03 )    Color: x / Appearance: x / SG: x / pH: x  Gluc: 75 mg/dL / Ketone: x  / Bili: x / Urobili: x   Blood: x / Protein: x / Nitrite: x   Leuk Esterase: x / RBC: x / WBC x   Sq Epi: x / Non Sq Epi: x / Bacteria: x      CAPILLARY BLOOD GLUCOSE          Culture - Urine (collected 04 Nov 2024 16:24)  Source: Clean Catch Clean Catch (Midstream)  Final Report (05 Nov 2024 15:58):    <10,000 CFU/mL Normal Urogenital Marquita        RADIOLOGY & ADDITIONAL STUDIES:    EKG:   Personally Reviewed:  [ ] YES     Imaging:   Personally Reviewed:  [ ] YES               [ ] Consultant(s) Notes Reviewed  [x] Care Discussed with Consultants/Other Providers: Neurosurgery Team    [x ] Fall risks identified:    [x ] Increased delirium risk    [x ] Delirium and other risks can be reduced by:          -early ambulation          -minimizing "tethers" - IV, oxygen, catheters, etc          -avoiding hypnotics and sedatives          -maintaining hydration/nutrition          -avoid anticholinergics - diphenhydramine, etc          -pain control          -supportive environment    Advanced Directives: [ ] DNR  [ ] No feeding tube  [ ] MOLST in chart  [ ] MOLST completed today  [x ] Unknown

## 2024-11-06 NOTE — PATIENT PROFILE ADULT - FALL HARM RISK - HARM RISK INTERVENTIONS
Assistance with ambulation/Assistance OOB with selected safe patient handling equipment/Communicate Risk of Fall with Harm to all staff/Discuss with provider need for PT consult/Monitor for mental status changes/Monitor gait and stability/Move patient closer to nurses' station/Provide patient with walking aids - walker, cane, crutches/Reinforce activity limits and safety measures with patient and family/Reorient to person, place and time as needed/Sit up slowly, dangle for a short time, stand at bedside before walking/Tailored Fall Risk Interventions/Toileting schedule using arm’s reach rule for commode and bathroom/Use of alarms - bed, chair and/or voice tab/Visual Cue: Yellow wristband and red socks/Bed in lowest position, wheels locked, appropriate side rails in place/Call bell, personal items and telephone in reach/Instruct patient to call for assistance before getting out of bed or chair/Non-slip footwear when patient is out of bed/Happy to call system/Physically safe environment - no spills, clutter or unnecessary equipment/Purposeful Proactive Rounding/Room/bathroom lighting operational, light cord in reach

## 2024-11-06 NOTE — CHART NOTE - NSCHARTNOTEFT_GEN_A_CORE
NUTRITION FOLLOW UP NOTE    PATIENT SEEN FOR: tube feeds/follow on [facility]    SOURCE: [] Patient  [x] Current Medical Record  [] RN  [] Family/support person at bedside  [x] Patient unavailable/inappropriate  [] Other:    CHART REVIEWED/EVENTS NOTED.  [] No changes to nutrition care plan to note  [] Nutrition Status:    DIET ORDER:   Diet, NPO with Tube Feed:   Tube Feeding Modality: Nasogastric  Glucerna 1.5 Cesar (GLUCERNA1.5RTH)  Total Volume for 24 Hours (mL): 1800  Continuous  Starting Tube Feed Rate {mL per Hour}: 20  Increase Tube Feed Rate by (mL): 10     Every 2 hours  Until Goal Tube Feed Rate (mL per Hour): 75  Tube Feed Duration (in Hours): 24  Tube Feed Start Time: 12:00 (11-04-24)      CURRENT DIET ORDER IS:  [] Appropriate:  [] Inadequate:  [x] Other: recommend decrease tube feeds to Glucerna 1.5 @ goal rate of 60ml/hr x 24 hr.     NUTRITION INTAKE/PROVISION:  [] PO:  [x] Enteral Nutrition: Glucerna 1.5 @ goal rate of 75ml/hr x 24hr.   [] Parenteral Nutrition:    ANTHROPOMETRICS:  Drug Dosing Weight  Height (cm): 177.8 (02 Nov 2024 08:46)  Weight (kg): 74.8 (02 Nov 2024 08:46)  BMI (kg/m2): 23.7 (02 Nov 2024 08:46)  BSA (m2): 1.92 (02 Nov 2024 08:46)  Weights:   Daily      NUTRITIONALLY PERTINENT MEDICATIONS:  MEDICATIONS  (STANDING):  atorvastatin  doxazosin  multivitamin/minerals/iron Oral Solution (CENTRUM)  piperacillin/tazobactam IVPB..  polyethylene glycol 3350  senna       NUTRITIONALLY PERTINENT LABS:  11-06 Na144 mmol/L Glu 75 mg/dL K+ 4.0 mmol/L Cr  0.85 mg/dL BUN 30 mg/dL[H] 11-04 Phos 2.7 mg/dL 11-02 Alb 3.2 g/dL[L]11-02 ALT 15 U/L AST 22 U/L Alkaline Phosphatase 75 U/L                  NUTRITIONALLY PERTINENT MEDICATIONS/LABS:  [x] Reviewed  [] Relevant notes on medications/labs:    EDEMA:  [x] Reviewed  [x] Relevant notes: +1 right arm    GI/ I&O:  [x] Reviewed  [x] Relevant notes: last BM 11/5  [] Other:    SKIN:   [x] No pressure injuries documented, per nursing flowsheet  [] Pressure injury previously noted  [] Change in pressure injury documentation:  [] Other:    ESTIMATED NEEDS:  [x] No change:  [] Updated:  Weight used for calculations	dosing weight on 11/4  Estimated Energy Needs Weight (lbs)	164.9 lb  Estimated Energy Needs Weight (kg)	74.8 kg  Estimated Energy Needs From (cesar/kg)	27  Estimated Energy Needs To (cesar/kg)	32  Estimated Energy Needs Calculated From (cesar/kg)	2019  Estimated Energy Needs Calculated To (cesar/kg)	2393  Weight used for calculations	dosing weight  Estimated Protein Needs Weight (lbs)	164.9 lb  Estimated Protein Needs Weight (kg)	74.8 kg  Estimated Protein Needs From (g/kg)	1.2  Estimated Protein Needs To (g/kg)	1.5  Estimated Protein Needs Calculated From (g/kg)	89.76  Estimated Protein Needs Calculated To (g/kg)	112.2  Other Calculations	defer fluid needs to team      NUTRITION DIAGNOSIS:  [x] Prior Dx: Increased Nutrient Needs..  [] New Dx:     EDUCATION:  [] Yes:  [x] Not appropriate/warranted    NUTRITION CARE PLAN:  1. Diet: recommend change tube feeds to Glucerna 1.5 @ goal rate of 60ml/hr x 24hr. provides 2160kcal/108 grams protein. 29kcal/kg and 1.4 grams protein/kg. based on dosing weight of 74.8kg  2. Supplements: discontinued mighty shakes with breakfast, pt not eating/drinking by mouth  3. Multivitamin/mineral supplementation: continue multivitamin if medically appropriate  4:     [] Achieved - Continue current nutrition intervention(s)  [] Current medical condition precludes nutrition intervention at this time.    MONITORING AND EVALUATION:   RD remains available upon request and will follow up per protocol.    Name Johanna Sandoval MA, RD, CDN/TEAMS   Available on [facility] TEAMS NUTRITION FOLLOW UP NOTE    PATIENT SEEN FOR: tube feeds/follow on [facility]    SOURCE: [] Patient  [x] Current Medical Record  [] RN  [] Family/support person at bedside  [x] Patient unavailable/inappropriate  [] Other:    CHART REVIEWED/EVENTS NOTED.  [] No changes to nutrition care plan to note  [] Nutrition Status:    DIET ORDER:   Diet, NPO with Tube Feed:   Tube Feeding Modality: Nasogastric  Glucerna 1.5 Cesar (GLUCERNA1.5RTH)  Total Volume for 24 Hours (mL): 1800  Continuous  Starting Tube Feed Rate {mL per Hour}: 20  Increase Tube Feed Rate by (mL): 10     Every 2 hours  Until Goal Tube Feed Rate (mL per Hour): 75  Tube Feed Duration (in Hours): 24  Tube Feed Start Time: 12:00 (11-04-24)      CURRENT DIET ORDER IS:  [] Appropriate:  [] Inadequate:  [x] Other: recommend decrease tube feeds to Glucerna 1.5 @ goal rate of 60ml/hr x 24 hr.     NUTRITION INTAKE/PROVISION:  [] PO:  [x] Enteral Nutrition: Glucerna 1.5 @ goal rate of 75ml/hr x 24hr.   [] Parenteral Nutrition:    ANTHROPOMETRICS:  Drug Dosing Weight  Height (cm): 177.8 (02 Nov 2024 08:46)  Weight (kg): 74.8 (02 Nov 2024 08:46)  BMI (kg/m2): 23.7 (02 Nov 2024 08:46)  BSA (m2): 1.92 (02 Nov 2024 08:46)  Weights:   Daily      NUTRITIONALLY PERTINENT MEDICATIONS:  MEDICATIONS  (STANDING):  atorvastatin  doxazosin  multivitamin/minerals/iron Oral Solution (CENTRUM)  piperacillin/tazobactam IVPB..  polyethylene glycol 3350  senna       NUTRITIONALLY PERTINENT LABS:  11-06 Na144 mmol/L Glu 75 mg/dL K+ 4.0 mmol/L Cr  0.85 mg/dL BUN 30 mg/dL[H] 11-04 Phos 2.7 mg/dL 11-02 Alb 3.2 g/dL[L]11-02 ALT 15 U/L AST 22 U/L Alkaline Phosphatase 75 U/L                  NUTRITIONALLY PERTINENT MEDICATIONS/LABS:  [x] Reviewed  [] Relevant notes on medications/labs:    EDEMA:  [x] Reviewed  [x] Relevant notes: +1 right arm    GI/ I&O:  [x] Reviewed  [x] Relevant notes: last BM 11/5  [] Other:    SKIN:   [x] No pressure injuries documented, per nursing flowsheet  [] Pressure injury previously noted  [] Change in pressure injury documentation:  [] Other:    ESTIMATED NEEDS:  [x] No change:  [] Updated:  Weight used for calculations	dosing weight on 11/4  Estimated Energy Needs Weight (lbs)	164.9 lb  Estimated Energy Needs Weight (kg)	74.8 kg  Estimated Energy Needs From (cesar/kg)	27  Estimated Energy Needs To (cesar/kg)	32  Estimated Energy Needs Calculated From (cesar/kg)	2019  Estimated Energy Needs Calculated To (cesar/kg)	2393  Weight used for calculations	dosing weight  Estimated Protein Needs Weight (lbs)	164.9 lb  Estimated Protein Needs Weight (kg)	74.8 kg  Estimated Protein Needs From (g/kg)	1.2  Estimated Protein Needs To (g/kg)	1.5  Estimated Protein Needs Calculated From (g/kg)	89.76  Estimated Protein Needs Calculated To (g/kg)	112.2  Other Calculations	defer fluid needs to team      NUTRITION DIAGNOSIS:  [x] Prior Dx: Increased Nutrient Needs..  [] New Dx:     EDUCATION:  [] Yes:  [x] Not appropriate/warranted    NUTRITION CARE PLAN:  1. Diet: recommend change tube feeds to Glucerna 1.5 @ goal rate of 60ml/hr x 24hr. provides 2160kcal/108 grams protein. 29kcal/kg and 1.4 grams protein/kg. based on dosing weight of 74.8kg  2. Supplements: discontinued mighty shakes with breakfast, pt not eating/drinking by mouth  3. Multivitamin/mineral supplementation: continue multivitamin if medically appropriate  4: recommend A1c    [] Achieved - Continue current nutrition intervention(s)  [] Current medical condition precludes nutrition intervention at this time.    MONITORING AND EVALUATION:   RD remains available upon request and will follow up per protocol.    Name Johanna Sandoval MA, DANNY, CDN/TEAMS   Available on [facility] TEAMS

## 2024-11-06 NOTE — PROGRESS NOTE ADULT - ASSESSMENT
HPI:  89M on ASA81, hx dementia (Ox2 baseline), HTN, PVD, recent hospitalization August 2024 for UTI/pyelonephritis 2/2 L hydroureteronephrosis s/p emergent cystoscoy and left ureteral stent placement and treated w/ abx. Today, p/f AMS, speech slurring, and difficulty walking. CTH w/ chronic L SDH (~2.5cm w/ 1.2cm MLS) w/ small acute component; new since August 2024 CTH. Exam: EOs, PERRL, confused but Ox1, no facial, not fully cooperating in motor exam but BUE at least 4+/5, BLE at least 4/5. SILT (02 Nov 2024 05:09)    PROCEDURE: Craniotomy, supratentorial, for subdural hematoma evacuation     s/p left craniotomy and subdural evacuation on 11/2   POD#  4   PLAN:  Neuro:   1 Out of bed   2 continue pt/ot   3 valproic acid 250 q8 for mood stabilization   4 will need subacute rehab     Respiratory:   1 room air   2 incentive spirometry   3 cxr improved pulmonary edema - s/p lasix in the icu     CV:  1 blood pressure stable   2 11/3 echo :   Left ventricular systolic function is mildly to moderately decreased with an ejection fraction visually estimated at 40 to 45 %. Regional wall motion abnormalities present.   1. There is moderate (grade 2) left ventricular diastolic dysfunction, with elevated left ventricular filling pressure.   2 . Left atrium is mildly dilated.  3 appreciate cardiology follow up     Endocrine:   1 stable     Heme/Onc:             DVT ppx: sqh and scds   1 stable     Renal:   1 h/o pyelonephritis and ureteral stent   2 urine cx negative     ID:   1 afebrile   2 blood cx from 11/2 negative to date   3 zosyn umanzor aspiration precaution - to be finished on 11/7   4 hospitalist to review     GI:   1 pureed diet   2 last bm 11/5     Social/Family:   Discharge planning: will need esme     -35 minutes spent for patient care and answered all questions     -will discuss with Dr. Godfrey   -spectra [phone]      HPI:  89M on ASA81, hx dementia (Ox2 baseline), HTN, PVD, recent hospitalization August 2024 for UTI/pyelonephritis 2/2 L hydroureteronephrosis s/p emergent cystoscoy and left ureteral stent placement and treated w/ abx. Today, p/f AMS, speech slurring, and difficulty walking. CTH w/ chronic L SDH (~2.5cm w/ 1.2cm MLS) w/ small acute component; new since August 2024 CTH. Exam: EOs, PERRL, confused but Ox1, no facial, not fully cooperating in motor exam but BUE at least 4+/5, BLE at least 4/5. SILT (02 Nov 2024 05:09)    PROCEDURE: Craniotomy, supratentorial, for subdural hematoma evacuation     s/p left craniotomy and subdural evacuation on 11/2   POD#  4   PLAN:  Neuro:   1 Out of bed   2 continue pt/ot   3 valproic acid 250 q8 for mood stabilization   4 will need subacute rehab   5 on seroquel 50 daily and Trazadone for mood stabilization   6 continue namenda for hx of dementia     Respiratory:   1 room air   2 incentive spirometry   3 cxr improved pulmonary edema - s/p lasix in the icu     CV:  1 blood pressure stable   2 11/3 echo :   Left ventricular systolic function is mildly to moderately decreased with an ejection fraction visually estimated at 40 to 45 %. Regional wall motion abnormalities present.   1. There is moderate (grade 2) left ventricular diastolic dysfunction, with elevated left ventricular filling pressure.   2 . Left atrium is mildly dilated.  3 appreciate cardiology follow up   4 continue statin     Endocrine:   1 stable     Heme/Onc:             DVT ppx: sqh and scds   1 stable     Renal:   1 h/o pyelonephritis and ureteral stent   2 urine cx negative     ID:   1 afebrile   2 blood cx from 11/2 negative to date   3 zosyn umanzor aspiration precaution - to be finished on 11/7   4 hospitalist to review     GI:   1 pureed diet   2 last bm 11/5     Social/Family:   Discharge planning: will need esme     -35 minutes spent for patient care and answered all questions     -will discuss with Dr. Godfrey   -spectra [phone]

## 2024-11-06 NOTE — CONSULT NOTE ADULT - PROBLEM SELECTOR RECOMMENDATION 4
Previously on NG feeds, pulled out NG. Now on pureed diet.     Feed w supervision, continue aspiration precautions.    No clear evidence of aspiration pna- can d/c Zosyn.

## 2024-11-06 NOTE — PROGRESS NOTE ADULT - SUBJECTIVE AND OBJECTIVE BOX
Subjective: Patient seen and examined. No new events except as noted.     REVIEW OF SYSTEMS:    CONSTITUTIONAL:+ weakness, fevers or chills  EYES/ENT: No visual changes;  No vertigo or throat pain   NECK: No pain or stiffness  RESPIRATORY: No cough, wheezing, hemoptysis; No shortness of breath  CARDIOVASCULAR: No chest pain or palpitations  GASTROINTESTINAL: No abdominal or epigastric pain. No nausea, vomiting, or hematemesis; No diarrhea or constipation. No melena or hematochezia.  GENITOURINARY: No dysuria, frequency or hematuria  NEUROLOGICAL: No numbness or weakness  SKIN: No itching, burning, rashes, or lesions   All other review of systems is negative unless indicated above.    MEDICATIONS:  MEDICATIONS  (STANDING):  atorvastatin 10 milliGRAM(s) Oral at bedtime  doxazosin 2 milliGRAM(s) Oral at bedtime  heparin   Injectable 5000 Unit(s) SubCutaneous every 8 hours  memantine 5 milliGRAM(s) Oral two times a day  multivitamin/minerals/iron Oral Solution (CENTRUM) 15 milliLiter(s) Oral daily  polyethylene glycol 3350 17 Gram(s) Oral daily  senna 2 Tablet(s) Oral at bedtime  traZODone 50 milliGRAM(s) Oral <User Schedule>  valproate sodium   IVPB 250 milliGRAM(s) IV Intermittent every 8 hours      PHYSICAL EXAM:  T(C): 36.5 (11-06-24 @ 16:04), Max: 36.6 (11-05-24 @ 20:45)  HR: 68 (11-06-24 @ 16:04) (68 - 89)  BP: 122/68 (11-06-24 @ 16:04) (122/68 - 157/95)  RR: 18 (11-06-24 @ 16:04) (18 - 18)  SpO2: 94% (11-06-24 @ 16:04) (94% - 99%)  Wt(kg): --  I&O's Summary    05 Nov 2024 07:01  -  06 Nov 2024 07:00  --------------------------------------------------------  IN: 685 mL / OUT: 400 mL / NET: 285 mL    06 Nov 2024 07:01  -  06 Nov 2024 18:06  --------------------------------------------------------  IN: 0 mL / OUT: 1 mL / NET: -1 mL                Appearance: NAD	+NGT   HEENT:  Dry  oral mucosa, PERRL, EOMI	  Lymphatic: No lymphadenopathy  Cardiovascular: Normal S1 S2, No JVD, +murmurs, No edema  Respiratory: Lungs clear to auscultation	  Psychiatry: A & O x 1  Gastrointestinal:  Soft, Non-tender, + BS	  Skin: No rashes, No ecchymoses, No cyanosis	  Neurologic: Awake, alert, fully oriented, follows commands, PERRL, VFFtc, EOMI, face symmetric, tongue midline, no drift, full strength  Neuro: A&O x 1, follows commands with coaxing, PERRL, EOMI, no facial, BUE 4+/5, RUE drift, BLE 4/5    Extremities: Normal range of motion, No clubbing, cyanosis or edema  Vascular: Peripheral pulses palpable 2+ bilaterally      LABS:    CARDIAC MARKERS:                                9.5    6.93  )-----------( 181      ( 06 Nov 2024 07:02 )             31.0     11-06    144  |  107  |  30[H]  ----------------------------<  75  4.0   |  24  |  0.85    Ca    8.2[L]      06 Nov 2024 07:03        TELEMETRY: 	    ECG:  	  RADIOLOGY:   DIAGNOSTIC TESTING:  [ ] Echocardiogram:  [ ]  Catheterization:  [ ] Stress Test:    OTHER:

## 2024-11-07 ENCOUNTER — TRANSCRIPTION ENCOUNTER (OUTPATIENT)
Age: 89
End: 2024-11-07

## 2024-11-07 VITALS
RESPIRATION RATE: 18 BRPM | SYSTOLIC BLOOD PRESSURE: 165 MMHG | OXYGEN SATURATION: 93 % | HEART RATE: 81 BPM | TEMPERATURE: 98 F | DIASTOLIC BLOOD PRESSURE: 91 MMHG

## 2024-11-07 PROCEDURE — 99233 SBSQ HOSP IP/OBS HIGH 50: CPT

## 2024-11-07 PROCEDURE — 71045 X-RAY EXAM CHEST 1 VIEW: CPT | Mod: 26

## 2024-11-07 RX ORDER — METOPROLOL TARTRATE 50 MG
25 TABLET ORAL DAILY
Refills: 0 | Status: DISCONTINUED | OUTPATIENT
Start: 2024-11-07 | End: 2024-11-07

## 2024-11-07 RX ORDER — METOPROLOL TARTRATE 50 MG
1 TABLET ORAL
Qty: 0 | Refills: 0 | DISCHARGE
Start: 2024-11-07

## 2024-11-07 RX ORDER — VALPROIC ACID 250 MG/5ML
1 SOLUTION ORAL
Qty: 0 | Refills: 0 | DISCHARGE
Start: 2024-11-07

## 2024-11-07 RX ORDER — HEPARIN SODIUM 10000 [USP'U]/ML
5000 INJECTION INTRAVENOUS; SUBCUTANEOUS
Qty: 0 | Refills: 0 | DISCHARGE
Start: 2024-11-07

## 2024-11-07 RX ORDER — VALPROIC ACID 250 MG/5ML
250 SOLUTION ORAL EVERY 8 HOURS
Refills: 0 | Status: DISCONTINUED | OUTPATIENT
Start: 2024-11-07 | End: 2024-11-07

## 2024-11-07 RX ORDER — DOXAZOSIN MESYLATE 8 MG
1 TABLET ORAL
Qty: 0 | Refills: 0 | DISCHARGE
Start: 2024-11-07

## 2024-11-07 RX ADMIN — HEPARIN SODIUM 5000 UNIT(S): 10000 INJECTION INTRAVENOUS; SUBCUTANEOUS at 13:01

## 2024-11-07 RX ADMIN — HEPARIN SODIUM 5000 UNIT(S): 10000 INJECTION INTRAVENOUS; SUBCUTANEOUS at 05:54

## 2024-11-07 RX ADMIN — VALPROIC ACID 52.5 MILLIGRAM(S): 250 SOLUTION ORAL at 05:54

## 2024-11-07 RX ADMIN — MEMANTINE HYDROCHLORIDE 5 MILLIGRAM(S): 21 CAPSULE, EXTENDED RELEASE ORAL at 05:55

## 2024-11-07 RX ADMIN — Medication 25 MILLIGRAM(S): at 12:25

## 2024-11-07 RX ADMIN — VALPROIC ACID 250 MILLIGRAM(S): 250 SOLUTION ORAL at 15:16

## 2024-11-07 NOTE — CHART NOTE - NSCHARTNOTEFT_GEN_A_CORE
89M on ASA81, hx dementia (Ox2 baseline), HTN, PVD, recent hospitalization August 2024 for UTI/pyelonephritis 2/2 L hydroureteronephrosis s/p emergent cystoscoy and left ureteral stent placement and treated w/ abx. Today, p/f AMS, speech slurring, and difficulty walking. CTH w/ chronic L SDH (~2.5cm w/ 1.2cm MLS) w/ small acute component; new since August 2024 CTH. Briefly intubated for emergent minicrani for "L acute on chronic SDH with MLS" on 11/2 with extubation following procedure. His ICU course has been notable for agitation requiring dexmedetomidine. Downgraded from ICU.    Pt seen for swallow f/u. Pt received upright in bed, AAOx1, on RA in no distress. Aware pt is currently receiving puree and thin liquids per recs from this service on 11/6, breakfast tray at bedside. Pt consumed regular solids and thin liquids PTA. Pt self-fed puree, bite-size solids, easy to chew solids, and thin liquids via cup and straw. Oral phase of swallow marked by slow yet functional mastication, timely AP transit, and good oral clearance. Pharyngeal response suspected to be delayed with reduced hyolaryngeal elevation/excursion upon palpation. No s/s penetration/aspiration observed.     Impressions: Pt continues to p/w oropharyngeal dysphagia, suspected c/b presbyphagia component, marked by prolonged mastication and suspected delayed pharyngeal swallow with reduced hyolaryngeal elevation/excursion upon palpation. No s/s penetration/aspiration observed across trials    Given above, recommend:  1. Soft bite-size and thin liquids  2. Full assistance with PO intake  3. Limit distractions during meals (i.e. TV, conversations)  4. This service will continue to follow for swallow remediation during hospitalization.    All of the above d/w pt, IGOR Patel, and INES Alexandre. This service will continue to follow for swallow remediation during hospitalization.    Anna Saunders M.A. CCC-SLP Teams   Speech-Language Pathology

## 2024-11-07 NOTE — PROVIDER CONTACT NOTE (OTHER) - ASSESSMENT
pt A&Ox0-1 . VSS. 2LNC on b/l wrist restraints. No s/s of chest pain SOB and no signs of acute distress noted.
A&Ox1-2. VS as charted. No complaints of pain, h/a, N/V/D, SOB

## 2024-11-07 NOTE — PROGRESS NOTE ADULT - PROBLEM SELECTOR PLAN 2
Likely chronic  Given advanced age and present issue SDH s/p evacuation will not pursue invasive workup, i.e LHC   PRN lasix   Possibly will start GDMT pending clinical outcome.
11/3 echo :   Left ventricular systolic function is mildly to moderately decreased with an ejection fraction visually estimated at 40 to 45 %. Regional wall motion abnormalities present. There is moderate (grade 2) left ventricular diastolic dysfunction, with elevated left ventricular filling pressure.    Given IV Lasix in the NSCU for pulm edema. Given another dose of 20mg iv on 11/6. Dyspnea now improved.     Per Cardiology- Likely chronic cardiac dysfunction. Given advanced age and present issue SDH s/p evacuation will not pursue invasive workup, i.e LHC. PRN lasix. Possibly will start GDMT pending clinical outcome.     On prn Labetalol for htn- has not required- start Toprol XL 25mg/d.
Likely chronic  Given advanced age and present issue SDH s/p evacuation will not pursue invasive workup, i.e LHC   PRN lasix   Possibly will start GDMT pending clinical outcome.  Prolonged Qtc   DCed seroquel  replete K > 4 and Mg > 2
Likely chronic  Given advanced age and present issue SDH s/p evacuation will not pursue invasive workup, i.e LHC   PRN lasix   Possibly will start GDMT pending clinical outcome.  Prolonged Qtc   DC seroquel  replete K > 4 and Mg > 2

## 2024-11-07 NOTE — PROGRESS NOTE ADULT - SUBJECTIVE AND OBJECTIVE BOX
Subjective: Patient seen and examined. No new events except as noted.     REVIEW OF SYSTEMS:    CONSTITUTIONAL: + weakness, fevers or chills  EYES/ENT: No visual changes;  No vertigo or throat pain   NECK: No pain or stiffness  RESPIRATORY: No cough, wheezing, hemoptysis; No shortness of breath  CARDIOVASCULAR: No chest pain or palpitations  GASTROINTESTINAL: No abdominal or epigastric pain. No nausea, vomiting, or hematemesis; No diarrhea or constipation. No melena or hematochezia.  GENITOURINARY: No dysuria, frequency or hematuria  NEUROLOGICAL: No numbness or weakness  SKIN: No itching, burning, rashes, or lesions   All other review of systems is negative unless indicated above.    MEDICATIONS:  MEDICATIONS  (STANDING):  atorvastatin 10 milliGRAM(s) Oral at bedtime  doxazosin 2 milliGRAM(s) Oral at bedtime  heparin   Injectable 5000 Unit(s) SubCutaneous every 8 hours  memantine 5 milliGRAM(s) Oral two times a day  multivitamin/minerals/iron Oral Solution (CENTRUM) 15 milliLiter(s) Oral daily  polyethylene glycol 3350 17 Gram(s) Oral daily  senna 2 Tablet(s) Oral at bedtime  traZODone 50 milliGRAM(s) Oral <User Schedule>  valproate sodium   IVPB 250 milliGRAM(s) IV Intermittent every 8 hours      PHYSICAL EXAM:  T(C): 36.5 (11-07-24 @ 08:04), Max: 36.6 (11-06-24 @ 11:29)  HR: 82 (11-07-24 @ 08:04) (68 - 89)  BP: 160/91 (11-07-24 @ 08:04) (122/68 - 164/88)  RR: 18 (11-07-24 @ 08:04) (18 - 18)  SpO2: 93% (11-07-24 @ 08:04) (93% - 97%)  Wt(kg): --  I&O's Summary    06 Nov 2024 07:01  -  07 Nov 2024 07:00  --------------------------------------------------------  IN: 0 mL / OUT: 1 mL / NET: -1 mL              Appearance: NAD  HEENT:  Dry  oral mucosa, PERRL, EOMI	  Lymphatic: No lymphadenopathy  Cardiovascular: Normal S1 S2, No JVD, +murmurs, No edema  Respiratory: Lungs clear to auscultation	  Psychiatry: A & O x 1  Gastrointestinal:  Soft, Non-tender, + BS	  Skin: No rashes, No ecchymoses, No cyanosis	  Neurologic: Awake, alert, fully oriented, follows commands, PERRL, VFFtc, EOMI, face symmetric, tongue midline, no drift, full strength  Neuro: A&O x 1, follows commands with coaxing, PERRL, EOMI, no facial, BUE 4+/5, RUE drift, BLE 4/5    Extremities: Normal range of motion, No clubbing, cyanosis or edema  Vascular: Peripheral pulses palpable 2+ bilaterally    edema      LABS:    CARDIAC MARKERS:                                9.5    6.93  )-----------( 181      ( 06 Nov 2024 07:02 )             31.0     11-06    144  |  107  |  30[H]  ----------------------------<  75  4.0   |  24  |  0.85    Ca    8.2[L]      06 Nov 2024 07:03          TELEMETRY: 	    ECG:  	  RADIOLOGY:   DIAGNOSTIC TESTING:  [ ] Echocardiogram:  [ ]  Catheterization:  [ ] Stress Test:    OTHER:

## 2024-11-07 NOTE — PROGRESS NOTE ADULT - THIS PATIENT HAS THE FOLLOWING CONDITION(S)/DIAGNOSES ON THIS ADMISSION:
None
SDH
Acute Respiratory Failure
None
None
None/Brain Compression / Herniation
SDH
Encephalopathy/Functional Quadriplegia/Cerebral Edema/Brain Compression / Herniation
None
None

## 2024-11-07 NOTE — PROGRESS NOTE ADULT - ASSESSMENT
89M on ASA81, hx dementia (Ox2 baseline), HTN, PVD, recent hospitalization August 2024 for UTI/pyelonephritis 2/2 L hydroureteronephrosis s/p emergent cystoscoy and left ureteral stent placement and treated w/ abx. P/f AMS, speech slurring, and difficulty walking. 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 Had subgaleal drains- removed.

## 2024-11-07 NOTE — PROGRESS NOTE ADULT - ASSESSMENT
89yo M with history of dementia (baseline AOx2), HTN, PVD and recent hospitalization for hydroureteronephrosis s/p L ureteral stent and antibiotics presented to [facility] with slurred speech and difficulty walking from baseline and found to have an acute on chronic L SDH (2.5cm) with MLS (1.2cm) for which he was transferred to [facility] where he underwent left craniotomy for SDH evacuation on 11/2/24.

## 2024-11-07 NOTE — DISCHARGE NOTE NURSING/CASE MANAGEMENT/SOCIAL WORK - NSDCVIVACCINE_GEN_ALL_CORE_FT
Tdap; 27-Mar-2022 16:28; Paz Peters (RN); [employer]; [id number] (Exp. Date: 18-Sep-2023); IntraMuscular; Deltoid Left.; 0.5 milliLiter(s); VIS (VIS Published: 09-May-2013, VIS Presented: 27-Mar-2022);   Tdap; 07-Aug-2024 15:55; Yayo Bartholomew (IGOR); [employer]; [id number] (Exp. Date: 10-Feb-2026); IntraMuscular; Deltoid Left.; 0.5 milliLiter(s); VIS (VIS Published: 09-May-2013, VIS Presented: 07-Aug-2024);

## 2024-11-07 NOTE — PROGRESS NOTE ADULT - ASSESSMENT
HPI:  89M on ASA81, hx dementia (Ox2 baseline), HTN, PVD, recent hospitalization August 2024 for UTI/pyelonephritis 2/2 L hydroureteronephrosis s/p emergent cystoscoy and left ureteral stent placement and treated w/ abx. Today, p/f AMS, speech slurring, and difficulty walking. CTH w/ chronic L SDH (~2.5cm w/ 1.2cm MLS) w/ small acute component; new since August 2024 CTH. Exam: EOs, PERRL, confused but Ox1, no facial, not fully cooperating in motor exam but BUE at least 4+/5, BLE at least 4/5. SILT (02 Nov 2024 05:09)    PROCEDURE: Craniotomy, supratentorial, for subdural hematoma evacuation     s/p left craniotomy and subdural evacuation on 11/2   POD#  4   PLAN:  Neuro:   1 Out of bed   2 continue pt/ot   3 valproic acid 250 q8 for mood stabilization   4 will need subacute rehab   5 on seroquel 50 daily and Trazadone for mood stabilization   6 continue namenda for hx of dementia     Respiratory:   1 room air   2 incentive spirometry   3 cxr improved pulmonary edema - s/p lasix in the icu     CV:  1 blood pressure stable   2 11/3 echo :   Left ventricular systolic function is mildly to moderately decreased with an ejection fraction visually estimated at 40 to 45 %. Regional wall motion abnormalities present.   1. There is moderate (grade 2) left ventricular diastolic dysfunction, with elevated left ventricular filling pressure.   2 . Left atrium is mildly dilated.  3 appreciate cardiology follow up   4 continue statin     Endocrine:   1 stable     Heme/Onc:             DVT ppx: sqh and scds   1 stable     Renal:   1 h/o pyelonephritis and ureteral stent   2 urine cx negative     ID:   1 afebrile   2 blood cx from 11/2 negative to date   3 zosyn umanzor aspiration precaution - to be finished on 11/7   4 hospitalist to review     GI:   1 pureed diet   2 last bm 11/5     Social/Family:   Discharge planning: will need esme     -35 minutes spent for patient care and answered all questions     -will discuss with Dr. Godfrey   -spectra [phone]     HPI:  89M on ASA81, hx dementia (Ox2 baseline), HTN, PVD, recent hospitalization August 2024 for UTI/pyelonephritis 2/2 L hydroureteronephrosis s/p emergent cystoscoy and left ureteral stent placement and treated w/ abx. Today, p/f AMS, speech slurring, and difficulty walking. CTH w/ chronic L SDH (~2.5cm w/ 1.2cm MLS) w/ small acute component; new since August 2024 CTH. Exam: EOs, PERRL, confused but Ox1, no facial, not fully cooperating in motor exam but BUE at least 4+/5, BLE at least 4/5. SILT (02 Nov 2024 05:09)    PROCEDURE: Craniotomy, supratentorial, for subdural hematoma evacuation     s/p left craniotomy and subdural evacuation on 11/2   POD#  4   PLAN:  Neuro:   1 Out of bed   2 continue pt/ot   3 valproic acid 250 q8 for mood stabilization   4 will need subacute rehab   5 on seroquel 50 daily and Trazadone for mood stabilization   6 continue namenda for hx of dementia     Respiratory:   1 room air   2 incentive spirometry   3 cxr improved pulmonary edema - s/p lasix in the icu     CV:  1 blood pressure stable   2 11/3 echo :   Left ventricular systolic function is mildly to moderately decreased with an ejection fraction visually estimated at 40 to 45 %. Regional wall motion abnormalities present.   1. There is moderate (grade 2) left ventricular diastolic dysfunction, with elevated left ventricular filling pressure.   2 . Left atrium is mildly dilated.  3 appreciate cardiology follow up   4 continue statin   5. Started on metoprolol XL 25mg daily per h ospitalist for GDMT    Endocrine:   1 stable     Heme/Onc:             DVT ppx: sqh and scds   1 stable     Renal:   1 h/o pyelonephritis and ureteral stent   2 urine cx negative     ID:   1 afebrile   2 blood cx from 11/2 negative to date   3 zosyn umanzor aspiration precaution - to be finished on 11/7   4 hospitalist to review     GI:   1 pureed diet   2 last bm 11/5     Social/Family:   Discharge planning: will need esme     -35 minutes spent for patient care and answered all questions     -will discuss with Dr. Godfrey   -spectra [phone]

## 2024-11-07 NOTE — CHART NOTE - NSCHARTNOTESELECT_GEN_ALL_CORE
Speech-Language Pathology
Nutrition Services
Speech-Language Pathology
Subgaleal BAKARI removed/Event Note
VTE Risk assessment/Event Note

## 2024-11-07 NOTE — PROGRESS NOTE ADULT - PROBLEM SELECTOR PLAN 4
Previously on NG feeds, pulled out NG. Now on pureed diet.     Feed w supervision, continue aspiration precautions.    No clear evidence of aspiration pna- Zosyn d/lisa.

## 2024-11-07 NOTE — DISCHARGE NOTE NURSING/CASE MANAGEMENT/SOCIAL WORK - PATIENT PORTAL LINK FT
You can access the FollowMyHealth Patient Portal offered by [facility] by registering at the following website: [email or web address]. By joining [employer]’s FollowMyHealth portal, you will also be able to view your health information using other applications (apps) compatible with our system.

## 2024-11-07 NOTE — PROGRESS NOTE ADULT - PROVIDER SPECIALTY LIST ADULT
Hospitalist
NSICU
NSICU
Neurosurgery
NSICU
Neurosurgery
NSICU
NSICU
Neurosurgery
Neurosurgery
NSICU
Neurosurgery
Cardiology
NSICU
NSICU
Cardiology
Cardiology

## 2024-11-07 NOTE — PROGRESS NOTE ADULT - SUBJECTIVE AND OBJECTIVE BOX
Patient is a 89y old  Male who presents with a chief complaint of Patient was admitted with left chronic subdural hematoma. (07 Nov 2024 07:53)      SUBJECTIVE / OVERNIGHT EVENTS: More alert today.     MEDICATIONS  (STANDING):  atorvastatin 10 milliGRAM(s) Oral at bedtime  doxazosin 2 milliGRAM(s) Oral at bedtime  heparin   Injectable 5000 Unit(s) SubCutaneous every 8 hours  memantine 5 milliGRAM(s) Oral two times a day  metoprolol succinate ER 25 milliGRAM(s) Oral daily  multivitamin/minerals/iron Oral Solution (CENTRUM) 15 milliLiter(s) Oral daily  polyethylene glycol 3350 17 Gram(s) Oral daily  senna 2 Tablet(s) Oral at bedtime  traZODone 50 milliGRAM(s) Oral <User Schedule>  valproic acid 250 milliGRAM(s) Oral every 8 hours    MEDICATIONS  (PRN):  labetalol Injectable 5 milliGRAM(s) IV Push every 6 hours PRN Systolic blood pressure > 180  ondansetron Injectable 4 milliGRAM(s) IV Push every 6 hours PRN Nausea and/or Vomiting      CAPILLARY BLOOD GLUCOSE        I&O's Summary    06 Nov 2024 07:01  -  07 Nov 2024 07:00  --------------------------------------------------------  IN: 0 mL / OUT: 1 mL / NET: -1 mL        PHYSICAL EXAM:  T(C): 36.3 (11-07-24 @ 11:32), Max: 36.5 (11-06-24 @ 16:04)  HR: 74 (11-07-24 @ 11:32) (68 - 89)  BP: 159/90 (11-07-24 @ 11:32) (122/68 - 164/88)  RR: 18 (11-07-24 @ 11:32) (18 - 18)  SpO2: 95% (11-07-24 @ 11:32) (93% - 97%)    Awake, alert, oriented to self, RRR, abdomen soft, good air entry anteriorly, moving all extremities    LABS:                        9.5    6.93  )-----------( 181      ( 06 Nov 2024 07:02 )             31.0     11-06    144  |  107  |  30[H]  ----------------------------<  75  4.0   |  24  |  0.85    Ca    8.2[L]      06 Nov 2024 07:03            Urinalysis Basic - ( 06 Nov 2024 07:03 )    Color: x / Appearance: x / SG: x / pH: x  Gluc: 75 mg/dL / Ketone: x  / Bili: x / Urobili: x   Blood: x / Protein: x / Nitrite: x   Leuk Esterase: x / RBC: x / WBC x   Sq Epi: x / Non Sq Epi: x / Bacteria: x        RADIOLOGY & ADDITIONAL TESTS:    Imaging Personally Reviewed:    Consultant(s) Notes Reviewed:      Care Discussed with Consultants/Other Providers: Nsx

## 2024-11-07 NOTE — PROGRESS NOTE ADULT - PROBLEM SELECTOR PLAN 1
Large left chronic subacute acute subdural hematoma with midline shift and brain compression, s/p Craniotomy, supratentorial, for subdural hematoma evacuation, CT head today stable  orders per NSCU.
Clinical course above. Repeat CT stable.    Lethargic yesterday, more awake and interactive today.     Awaiting rehab.
Large left chronic subacute acute subdural hematoma with midline shift and brain compression, s/p Craniotomy, supratentorial, for subdural hematoma evacuation, CT head today stable  orders per NSCU.
Large left chronic subacute acute subdural hematoma with midline shift and brain compression, s/p Craniotomy, supratentorial, for subdural hematoma evacuation, CT head today stable  orders per NSCU.

## 2024-11-07 NOTE — PROGRESS NOTE ADULT - SUBJECTIVE AND OBJECTIVE BOX
SUBJECTIVE:   Patient was seen and evaluated at bedside. Patient is resting in chair and is in no new acute distress.   OVERNIGHT EVENTS:   none       ICU Vital Signs Last 24 Hrs  T(C): 36.3 (07 Nov 2024 04:01), Max: 36.6 (06 Nov 2024 11:29)  T(F): 97.3 (07 Nov 2024 04:01), Max: 97.8 (06 Nov 2024 11:29)  HR: 74 (07 Nov 2024 04:01) (68 - 89)  BP: 164/88 (07 Nov 2024 04:01) (122/68 - 164/88)  BP(mean): --  ABP: --  ABP(mean): --  RR: 18 (07 Nov 2024 04:01) (18 - 18)  SpO2: 96% (07 Nov 2024 04:01) (94% - 97%)    O2 Parameters below as of 07 Nov 2024 04:01  Patient On (Oxygen Delivery Method): room air            PHYSICAL EXAM:    General: No Acute Distress     Neurological: awake alert oriented to person, wide awake, no facial weakness noted, Uppers and lowers are strong 4+/5, sensation wnl.     Pulmonary: Clear to Auscultation, No Rales, No Rhonchi, No Wheezes     Cardiovascular: S1, S2, Regular Rate and Rhythm     Gastrointestinal: Soft, Nontender, Nondistended     Incision: clean and dry    LABS:                        9.5    6.93  )-----------( 181      ( 06 Nov 2024 07:02 )             31.0    11-06    144  |  107  |  30[H]  ----------------------------<  75  4.0   |  24  |  0.85    Ca    8.2[L]      06 Nov 2024 07:03          11-05 @ 07:01  -  11-06 @ 07:00  --------------------------------------------------------  IN: 685 mL / OUT: 400 mL / NET: 285 mL      DRAINS:     MEDICATIONS:  Antibiotics:  piperacillin/tazobactam IVPB.. 3.375 Gram(s) IV Intermittent every 8 hours    Neuro:  memantine 5 milliGRAM(s) Oral two times a day  ondansetron Injectable 4 milliGRAM(s) IV Push every 6 hours PRN Nausea and/or Vomiting  QUEtiapine 50 milliGRAM(s) Oral daily  traZODone 100 milliGRAM(s) Oral <User Schedule>  valproate sodium   IVPB 250 milliGRAM(s) IV Intermittent every 8 hours    Cardiac:  doxazosin 2 milliGRAM(s) Oral at bedtime  labetalol Injectable 5 milliGRAM(s) IV Push every 6 hours PRN Systolic blood pressure > 180    Pulm:    GI/:  polyethylene glycol 3350 17 Gram(s) Oral daily  senna 2 Tablet(s) Oral at bedtime    Other:   atorvastatin 10 milliGRAM(s) Oral at bedtime  heparin   Injectable 5000 Unit(s) SubCutaneous every 8 hours  multivitamin/minerals/iron Oral Solution (CENTRUM) 15 milliLiter(s) Oral daily    DIET: [] Regular [] CCD [] Renal [] Puree [] Dysphagia [] Tube Feeds:   pureed diet   IMAGING:   ACC: [id number] EXAM:  CT BRAIN   ORDERED BY: KUSUM PARRISH     PROCEDURE DATE:  11/04/2024          INTERPRETATION:  CLINICAL INFORMATION: interval scan      5mm axial sections of the brain were obtained from base to vertex,   without the intravenous administration of contrast material. Images were   obtained on a portable CT scanner and compared with 11/3/2024.      There has been a left frontal craniotomy. There is air in the subgaleal   space. There is residual mixed density subdural collection with air   unchanged since the prior exam. There is residual mass effect on the   cerebral cortical sulci and the left lateral ventricle without   significant midline shift to the right. Previous bilateral lens   replacement surgery.    IMPRESSION: No change since 11/3/2024. Left frontal craniotomy. Residual   mixed density subdural collection and air.    --- End of Report ---            NORMA NOONAN MD; Attending Radiologist  This document has been electronically signed. Nov 4 2024  9:18AM

## 2024-11-07 NOTE — DISCHARGE NOTE NURSING/CASE MANAGEMENT/SOCIAL WORK - FINANCIAL ASSISTANCE
[facility] provides services at a reduced cost to those who are determined to be eligible through [facility]’s financial assistance program. Information regarding [facility]’s financial assistance program can be found by going to [email or web address] or by calling [phone].

## 2024-11-07 NOTE — PROGRESS NOTE ADULT - PROBLEM SELECTOR PLAN 3
aspiration precautions.
aspiration precautions.
On VPA 250mg q8 for mood stabilization. Oversedated on 11/6- Seroquel stopped, Trazodone decreased to 50mg from 100mg.     Now awake, alert, more interactive. Continue current regimen.
aspiration precautions.

## 2024-11-07 NOTE — PROGRESS NOTE ADULT - REASON FOR ADMISSION
Patient was admitted with left chronic subdural hematoma.
SDH
Patient was admitted with left chronic subdural hematoma.
SDH (2.5cm) with MLS (1.2cm)

## 2024-11-07 NOTE — PROGRESS NOTE ADULT - PROBLEM SELECTOR PLAN 5
On sq heparin.     H/o pyelonephritis and ureteral stent- urine cx negative     Last bm 11/5    DNR/DNI.    Cleared for rehab.

## 2024-11-08 ENCOUNTER — NON-APPOINTMENT (OUTPATIENT)
Age: 89
End: 2024-11-08

## 2024-11-08 RX ORDER — TRAZODONE HYDROCHLORIDE 100 MG/1
50 TABLET ORAL
Qty: 0 | Refills: 0 | DISCHARGE
Start: 2024-11-08

## 2024-11-20 PROCEDURE — 84484 ASSAY OF TROPONIN QUANT: CPT

## 2024-11-20 PROCEDURE — 87040 BLOOD CULTURE FOR BACTERIA: CPT

## 2024-11-20 PROCEDURE — 70450 CT HEAD/BRAIN W/O DYE: CPT | Mod: MC

## 2024-11-20 PROCEDURE — 85610 PROTHROMBIN TIME: CPT

## 2024-11-20 PROCEDURE — 87086 URINE CULTURE/COLONY COUNT: CPT

## 2024-11-20 PROCEDURE — 83880 ASSAY OF NATRIURETIC PEPTIDE: CPT

## 2024-11-20 PROCEDURE — 81001 URINALYSIS AUTO W/SCOPE: CPT

## 2024-11-20 PROCEDURE — 85730 THROMBOPLASTIN TIME PARTIAL: CPT

## 2024-11-20 PROCEDURE — 36415 COLL VENOUS BLD VENIPUNCTURE: CPT

## 2024-11-20 PROCEDURE — 93971 EXTREMITY STUDY: CPT

## 2024-11-20 PROCEDURE — 71045 X-RAY EXAM CHEST 1 VIEW: CPT

## 2024-11-20 PROCEDURE — 80053 COMPREHEN METABOLIC PANEL: CPT

## 2024-11-20 PROCEDURE — 83605 ASSAY OF LACTIC ACID: CPT

## 2024-11-20 PROCEDURE — 96375 TX/PRO/DX INJ NEW DRUG ADDON: CPT

## 2024-11-20 PROCEDURE — 85025 COMPLETE CBC W/AUTO DIFF WBC: CPT

## 2024-11-20 PROCEDURE — 74176 CT ABD & PELVIS W/O CONTRAST: CPT | Mod: MC

## 2024-11-20 PROCEDURE — 93005 ELECTROCARDIOGRAM TRACING: CPT

## 2024-11-20 PROCEDURE — 96374 THER/PROPH/DIAG INJ IV PUSH: CPT

## 2024-11-20 PROCEDURE — 84145 PROCALCITONIN (PCT): CPT

## 2024-11-20 PROCEDURE — 99291 CRITICAL CARE FIRST HOUR: CPT | Mod: 25

## 2024-11-21 ENCOUNTER — APPOINTMENT (OUTPATIENT)
Dept: NEUROSURGERY | Facility: CLINIC | Age: 89
End: 2024-11-21

## 2024-11-26 PROCEDURE — 72125 CT NECK SPINE W/O DYE: CPT | Mod: MC

## 2024-11-26 PROCEDURE — 85730 THROMBOPLASTIN TIME PARTIAL: CPT

## 2024-11-26 PROCEDURE — 94640 AIRWAY INHALATION TREATMENT: CPT

## 2024-11-26 PROCEDURE — 87086 URINE CULTURE/COLONY COUNT: CPT

## 2024-11-26 PROCEDURE — 87040 BLOOD CULTURE FOR BACTERIA: CPT

## 2024-11-26 PROCEDURE — 97116 GAIT TRAINING THERAPY: CPT

## 2024-11-26 PROCEDURE — 93306 TTE W/DOPPLER COMPLETE: CPT

## 2024-11-26 PROCEDURE — 93005 ELECTROCARDIOGRAM TRACING: CPT

## 2024-11-26 PROCEDURE — 86922 COMPATIBILITY TEST ANTIGLOB: CPT

## 2024-11-26 PROCEDURE — 85610 PROTHROMBIN TIME: CPT

## 2024-11-26 PROCEDURE — 85027 COMPLETE CBC AUTOMATED: CPT

## 2024-11-26 PROCEDURE — 81001 URINALYSIS AUTO W/SCOPE: CPT

## 2024-11-26 PROCEDURE — 94002 VENT MGMT INPAT INIT DAY: CPT

## 2024-11-26 PROCEDURE — 99285 EMERGENCY DEPT VISIT HI MDM: CPT

## 2024-11-26 PROCEDURE — 83605 ASSAY OF LACTIC ACID: CPT

## 2024-11-26 PROCEDURE — 85014 HEMATOCRIT: CPT

## 2024-11-26 PROCEDURE — 86900 BLOOD TYPING SEROLOGIC ABO: CPT

## 2024-11-26 PROCEDURE — 84100 ASSAY OF PHOSPHORUS: CPT

## 2024-11-26 PROCEDURE — C1713: CPT

## 2024-11-26 PROCEDURE — 85018 HEMOGLOBIN: CPT

## 2024-11-26 PROCEDURE — 92526 ORAL FUNCTION THERAPY: CPT

## 2024-11-26 PROCEDURE — 87640 STAPH A DNA AMP PROBE: CPT

## 2024-11-26 PROCEDURE — 82947 ASSAY GLUCOSE BLOOD QUANT: CPT

## 2024-11-26 PROCEDURE — 85396 CLOTTING ASSAY WHOLE BLOOD: CPT

## 2024-11-26 PROCEDURE — 93970 EXTREMITY STUDY: CPT

## 2024-11-26 PROCEDURE — 86850 RBC ANTIBODY SCREEN: CPT

## 2024-11-26 PROCEDURE — 85025 COMPLETE CBC W/AUTO DIFF WBC: CPT

## 2024-11-26 PROCEDURE — 70450 CT HEAD/BRAIN W/O DYE: CPT | Mod: MC

## 2024-11-26 PROCEDURE — 97530 THERAPEUTIC ACTIVITIES: CPT

## 2024-11-26 PROCEDURE — C1769: CPT

## 2024-11-26 PROCEDURE — 82435 ASSAY OF BLOOD CHLORIDE: CPT

## 2024-11-26 PROCEDURE — 84295 ASSAY OF SERUM SODIUM: CPT

## 2024-11-26 PROCEDURE — C1889: CPT

## 2024-11-26 PROCEDURE — 87641 MR-STAPH DNA AMP PROBE: CPT

## 2024-11-26 PROCEDURE — 97161 PT EVAL LOW COMPLEX 20 MIN: CPT

## 2024-11-26 PROCEDURE — 92610 EVALUATE SWALLOWING FUNCTION: CPT

## 2024-11-26 PROCEDURE — 83735 ASSAY OF MAGNESIUM: CPT

## 2024-11-26 PROCEDURE — 71045 X-RAY EXAM CHEST 1 VIEW: CPT

## 2024-11-26 PROCEDURE — 86901 BLOOD TYPING SEROLOGIC RH(D): CPT

## 2024-11-26 PROCEDURE — 80053 COMPREHEN METABOLIC PANEL: CPT

## 2024-11-26 PROCEDURE — 84132 ASSAY OF SERUM POTASSIUM: CPT

## 2024-11-26 PROCEDURE — 97166 OT EVAL MOD COMPLEX 45 MIN: CPT

## 2024-11-26 PROCEDURE — 36415 COLL VENOUS BLD VENIPUNCTURE: CPT

## 2024-11-26 PROCEDURE — 80048 BASIC METABOLIC PNL TOTAL CA: CPT

## 2024-11-26 PROCEDURE — 82803 BLOOD GASES ANY COMBINATION: CPT

## 2024-11-26 PROCEDURE — 85576 BLOOD PLATELET AGGREGATION: CPT

## 2024-11-26 PROCEDURE — 82330 ASSAY OF CALCIUM: CPT

## 2024-12-04 ENCOUNTER — EMERGENCY (EMERGENCY)
Facility: HOSPITAL | Age: 88
LOS: 1 days | Discharge: ANOTHER HOSPITAL | End: 2024-12-04
Attending: STUDENT IN AN ORGANIZED HEALTH CARE EDUCATION/TRAINING PROGRAM | Admitting: EMERGENCY MEDICINE
Payer: MEDICARE

## 2024-12-04 ENCOUNTER — TRANSCRIPTION ENCOUNTER (OUTPATIENT)
Age: 88
End: 2024-12-04

## 2024-12-04 ENCOUNTER — INPATIENT (INPATIENT)
Facility: HOSPITAL | Age: 88
LOS: 5 days | Discharge: NURSING, REHAB OR LONG-TERM CARE | DRG: 689 | End: 2024-12-10
Attending: INTERNAL MEDICINE | Admitting: INTERNAL MEDICINE
Payer: OTHER GOVERNMENT

## 2024-12-04 VITALS
OXYGEN SATURATION: 96 % | SYSTOLIC BLOOD PRESSURE: 132 MMHG | RESPIRATION RATE: 18 BRPM | DIASTOLIC BLOOD PRESSURE: 71 MMHG | HEART RATE: 83 BPM

## 2024-12-04 VITALS
TEMPERATURE: 96 F | SYSTOLIC BLOOD PRESSURE: 145 MMHG | HEART RATE: 75 BPM | WEIGHT: 120.59 LBS | RESPIRATION RATE: 16 BRPM | HEIGHT: 70 IN | OXYGEN SATURATION: 97 % | DIASTOLIC BLOOD PRESSURE: 82 MMHG

## 2024-12-04 VITALS
TEMPERATURE: 98 F | WEIGHT: 119.93 LBS | HEIGHT: 70 IN | DIASTOLIC BLOOD PRESSURE: 70 MMHG | OXYGEN SATURATION: 94 % | SYSTOLIC BLOOD PRESSURE: 133 MMHG | HEART RATE: 80 BPM | RESPIRATION RATE: 18 BRPM

## 2024-12-04 DIAGNOSIS — N39.0 URINARY TRACT INFECTION, SITE NOT SPECIFIED: ICD-10-CM

## 2024-12-04 LAB
ALBUMIN SERPL ELPH-MCNC: 3.1 G/DL — LOW (ref 3.3–5)
ALP SERPL-CCNC: 86 U/L — SIGNIFICANT CHANGE UP (ref 40–120)
ALT FLD-CCNC: 33 U/L — SIGNIFICANT CHANGE UP (ref 12–78)
ANION GAP SERPL CALC-SCNC: 6 MMOL/L — SIGNIFICANT CHANGE UP (ref 5–17)
APPEARANCE UR: ABNORMAL
APTT BLD: 29.9 SEC — SIGNIFICANT CHANGE UP (ref 24.5–35.6)
AST SERPL-CCNC: 37 U/L — SIGNIFICANT CHANGE UP (ref 15–37)
BASOPHILS # BLD AUTO: 0.01 K/UL — SIGNIFICANT CHANGE UP (ref 0–0.2)
BASOPHILS NFR BLD AUTO: 0.1 % — SIGNIFICANT CHANGE UP (ref 0–2)
BILIRUB SERPL-MCNC: 0.5 MG/DL — SIGNIFICANT CHANGE UP (ref 0.2–1.2)
BILIRUB UR-MCNC: NEGATIVE — SIGNIFICANT CHANGE UP
BUN SERPL-MCNC: 44 MG/DL — HIGH (ref 7–23)
CALCIUM SERPL-MCNC: 9 MG/DL — SIGNIFICANT CHANGE UP (ref 8.5–10.1)
CHLORIDE SERPL-SCNC: 111 MMOL/L — HIGH (ref 96–108)
CO2 SERPL-SCNC: 27 MMOL/L — SIGNIFICANT CHANGE UP (ref 22–31)
COLOR SPEC: YELLOW — SIGNIFICANT CHANGE UP
CREAT SERPL-MCNC: 1.1 MG/DL — SIGNIFICANT CHANGE UP (ref 0.5–1.3)
DIFF PNL FLD: ABNORMAL
EGFR: 61 ML/MIN/1.73M2 — SIGNIFICANT CHANGE UP
EGFR: 61 ML/MIN/1.73M2 — SIGNIFICANT CHANGE UP
EOSINOPHIL # BLD AUTO: 0.01 K/UL — SIGNIFICANT CHANGE UP (ref 0–0.5)
EOSINOPHIL NFR BLD AUTO: 0.1 % — SIGNIFICANT CHANGE UP (ref 0–6)
GLUCOSE SERPL-MCNC: 95 MG/DL — SIGNIFICANT CHANGE UP (ref 70–99)
GLUCOSE UR QL: NEGATIVE MG/DL — SIGNIFICANT CHANGE UP
HCT VFR BLD CALC: 39 % — SIGNIFICANT CHANGE UP (ref 39–50)
HGB BLD-MCNC: 12.8 G/DL — LOW (ref 13–17)
IMM GRANULOCYTES NFR BLD AUTO: 0.4 % — SIGNIFICANT CHANGE UP (ref 0–0.9)
INR BLD: 1.06 RATIO — SIGNIFICANT CHANGE UP (ref 0.85–1.16)
KETONES UR-MCNC: ABNORMAL MG/DL
LEUKOCYTE ESTERASE UR-ACNC: ABNORMAL
LYMPHOCYTES # BLD AUTO: 1.3 K/UL — SIGNIFICANT CHANGE UP (ref 1–3.3)
LYMPHOCYTES # BLD AUTO: 12.1 % — LOW (ref 13–44)
MCHC RBC-ENTMCNC: 31 PG — SIGNIFICANT CHANGE UP (ref 27–34)
MCHC RBC-ENTMCNC: 32.8 G/DL — SIGNIFICANT CHANGE UP (ref 32–36)
MCV RBC AUTO: 94.4 FL — SIGNIFICANT CHANGE UP (ref 80–100)
MONOCYTES # BLD AUTO: 0.61 K/UL — SIGNIFICANT CHANGE UP (ref 0–0.9)
MONOCYTES NFR BLD AUTO: 5.7 % — SIGNIFICANT CHANGE UP (ref 2–14)
NEUTROPHILS # BLD AUTO: 8.79 K/UL — HIGH (ref 1.8–7.4)
NEUTROPHILS NFR BLD AUTO: 81.6 % — HIGH (ref 43–77)
NITRITE UR-MCNC: NEGATIVE — SIGNIFICANT CHANGE UP
NRBC # BLD: 0 /100 WBCS — SIGNIFICANT CHANGE UP (ref 0–0)
NRBC BLD-RTO: 0 /100 WBCS — SIGNIFICANT CHANGE UP (ref 0–0)
PH UR: 8 — SIGNIFICANT CHANGE UP (ref 5–8)
PLATELET # BLD AUTO: 173 K/UL — SIGNIFICANT CHANGE UP (ref 150–400)
POTASSIUM SERPL-MCNC: 4.2 MMOL/L — SIGNIFICANT CHANGE UP (ref 3.5–5.3)
POTASSIUM SERPL-SCNC: 4.2 MMOL/L — SIGNIFICANT CHANGE UP (ref 3.5–5.3)
PROT SERPL-MCNC: 7 G/DL — SIGNIFICANT CHANGE UP (ref 6–8.3)
PROT UR-MCNC: 300 MG/DL
PROTHROM AB SERPL-ACNC: 12.5 SEC — SIGNIFICANT CHANGE UP (ref 9.9–13.4)
RBC # BLD: 4.13 M/UL — LOW (ref 4.2–5.8)
RBC # FLD: 15.9 % — HIGH (ref 10.3–14.5)
SODIUM SERPL-SCNC: 144 MMOL/L — SIGNIFICANT CHANGE UP (ref 135–145)
SP GR SPEC: 1.01 — SIGNIFICANT CHANGE UP (ref 1–1.03)
TROPONIN I, HIGH SENSITIVITY RESULT: 39.8 NG/L — SIGNIFICANT CHANGE UP
UROBILINOGEN FLD QL: 0.2 MG/DL — SIGNIFICANT CHANGE UP (ref 0.2–1)
WBC # BLD: 10.76 K/UL — HIGH (ref 3.8–10.5)
WBC # FLD AUTO: 10.76 K/UL — HIGH (ref 3.8–10.5)

## 2024-12-04 PROCEDURE — 99285 EMERGENCY DEPT VISIT HI MDM: CPT | Mod: FS

## 2024-12-04 PROCEDURE — 36415 COLL VENOUS BLD VENIPUNCTURE: CPT

## 2024-12-04 PROCEDURE — 81001 URINALYSIS AUTO W/SCOPE: CPT

## 2024-12-04 PROCEDURE — 99285 EMERGENCY DEPT VISIT HI MDM: CPT | Mod: GC

## 2024-12-04 PROCEDURE — 85730 THROMBOPLASTIN TIME PARTIAL: CPT

## 2024-12-04 PROCEDURE — 87077 CULTURE AEROBIC IDENTIFY: CPT

## 2024-12-04 PROCEDURE — 70450 CT HEAD/BRAIN W/O DYE: CPT | Mod: 26,MC

## 2024-12-04 PROCEDURE — 93005 ELECTROCARDIOGRAM TRACING: CPT

## 2024-12-04 PROCEDURE — 96365 THER/PROPH/DIAG IV INF INIT: CPT

## 2024-12-04 PROCEDURE — 87186 SC STD MICRODIL/AGAR DIL: CPT

## 2024-12-04 PROCEDURE — 71045 X-RAY EXAM CHEST 1 VIEW: CPT | Mod: 26

## 2024-12-04 PROCEDURE — 93010 ELECTROCARDIOGRAM REPORT: CPT

## 2024-12-04 PROCEDURE — 71045 X-RAY EXAM CHEST 1 VIEW: CPT

## 2024-12-04 PROCEDURE — 87086 URINE CULTURE/COLONY COUNT: CPT

## 2024-12-04 PROCEDURE — 96361 HYDRATE IV INFUSION ADD-ON: CPT

## 2024-12-04 PROCEDURE — 85025 COMPLETE CBC W/AUTO DIFF WBC: CPT

## 2024-12-04 PROCEDURE — 70450 CT HEAD/BRAIN W/O DYE: CPT | Mod: 26,MC,77

## 2024-12-04 PROCEDURE — 99285 EMERGENCY DEPT VISIT HI MDM: CPT | Mod: 25

## 2024-12-04 PROCEDURE — 80053 COMPREHEN METABOLIC PANEL: CPT

## 2024-12-04 PROCEDURE — 84484 ASSAY OF TROPONIN QUANT: CPT

## 2024-12-04 PROCEDURE — 70450 CT HEAD/BRAIN W/O DYE: CPT | Mod: MC

## 2024-12-04 PROCEDURE — 85610 PROTHROMBIN TIME: CPT

## 2024-12-04 RX ORDER — HALOPERIDOL 2 MG
2.5 TABLET ORAL ONCE
Refills: 0 | Status: COMPLETED | OUTPATIENT
Start: 2024-12-04 | End: 2024-12-04

## 2024-12-04 RX ORDER — CEFTRIAXONE 500 MG/1
1000 INJECTION, POWDER, FOR SOLUTION INTRAMUSCULAR; INTRAVENOUS ONCE
Refills: 0 | Status: COMPLETED | OUTPATIENT
Start: 2024-12-04 | End: 2024-12-04

## 2024-12-04 RX ADMIN — CEFTRIAXONE 100 MILLIGRAM(S): 500 INJECTION, POWDER, FOR SOLUTION INTRAMUSCULAR; INTRAVENOUS at 15:48

## 2024-12-04 RX ADMIN — Medication 1000 MILLILITER(S): at 14:12

## 2024-12-04 RX ADMIN — CEFTRIAXONE 1000 MILLIGRAM(S): 500 INJECTION, POWDER, FOR SOLUTION INTRAMUSCULAR; INTRAVENOUS at 16:13

## 2024-12-04 RX ADMIN — Medication 2.5 MILLIGRAM(S): at 21:00

## 2024-12-04 RX ADMIN — Medication 500 MILLILITER(S): at 15:12

## 2024-12-04 NOTE — ED PROVIDER NOTE - PHYSICAL EXAMINATION
Patient is awake alert talking and not in any apparent distress.  He is very hard of hearing and does not respond appropriately to my commands though he does talk.  Moving all 4 extremities 2 mm pupils regular rate and rhythm clear lungs nontender abdomen.  There is a surgical scar to the chest.  His lower extremities are warm and well-perfused.  He is chronically ill-appearing and elderly and frail.

## 2024-12-04 NOTE — ED ADULT NURSE REASSESSMENT NOTE - NS ED NURSE REASSESS COMMENT FT1
unable to obtain vital signs at this time. Pt rips off all equipment, rips off diaper and sheets and hospital gown. Pt agitated and scratching staff. MD made aware and pt medicated. Will reassess

## 2024-12-04 NOTE — ED PROVIDER NOTE - PROGRESS NOTE DETAILS
Spoke with neurosurgery who stated the ICH was stable and no intervention was needed.  Patient was still agitated and taken off close.  I spoke with the daughter who agreed to have the patient admitted for IV antibiotics for UTI.  I spoke with patient's primary care, Dr. Cota who advised me to admit to Dr. Ashish Bowden.    Bassam Allen MD (PGY 2)

## 2024-12-04 NOTE — CONSULT NOTE ADULT - ASSESSMENT
-exam and imaging stable to improved from d/c  -short interval CTH 6pm 12/4  -if CTH stable medicine admit for AMS w/u, PT -exam and imaging stable to improved from d/c  -short interval CTH 6pm 12/4  -if CTH stable no c/i to medicine admit for AMS w/u vs. discharge if clinically stable

## 2024-12-04 NOTE — ED PROVIDER NOTE - OBJECTIVE STATEMENT
This is a 89-year-old gentleman with a history of dementia currently residing on the dementia unit coronary artery disease hypertension peripheral vascular disease hyperlipidemia and was recently admitted for a craniotomy for subdural hemorrhage.  He has been having foul-smelling urine at the facility so they sent him to the [facility] for urinalysis.  His mental status is baseline poor however there was concern for potential worsening of his mental status and so they did a CT brain.  At that time he was found to have subdural hemorrhage that was 1.7 cm.  Notably there is no trauma injury intervening time.  Between having his craniotomy and today.  I got much of the history from the patient's daughter at bedside and she states that he is confused at baseline and is difficult to redirect and the only change from baseline is that over the last 2 weeks he has been making himself naked by taking off his close which is something he never did before.    No longer on antiplatelet or anticoagulant at this time.

## 2024-12-04 NOTE — ED ADULT NURSE NOTE - OBJECTIVE STATEMENT
89 y/o M bibems as a transfer from [facility]. Pt history of dementia, daughter at bedside. Daughter states the pt was acting more confused at rehab facility two days ago. Pt went to [facility] and was confirmed for UTI. CT scan was done and they found "possible new blood in head". Pt was found to have a subdural hematoma a month ago and had a craniotomy done. AAOx1 (self). Breathing spontaneous and unlabored. Skin warm, dry, and color normal for race. 89 y/o M benny as a transfer from [facility]. Pt history of dementia, daughter at bedside. Daughter states the pt was acting more confused at rehab facility two days ago. Pt went to [facility] and was confirmed for UTI. CT scan was done and they found "possible new blood in head". Pt was found to have a subdural hematoma a month ago and had a craniotomy done. AAOx1 (self). Breathing spontaneous and unlabored. Skin warm, dry, and color normal for race. Pt placed in new diaper and attempted to place pt on cardiac monitor. Pt began to pull on cardiac monitor lines and pull diaper down. Attempted to redirect pt.

## 2024-12-04 NOTE — ED PROVIDER NOTE - CLINICAL SUMMARY MEDICAL DECISION MAKING FREE TEXT BOX
I discussed this case with the neurosurgery resident who saw the patient while he was still awaiting bed placement in this ER.  Neurosurgery recommends for short-term CT brain repeat.  I ordered it as of now.  Please see above regarding my discussion with the patient's daughter on goals of care and patient will require antibiotics if we do discharge.  The sending facility did administer ceftriaxone.–Saad Michele

## 2024-12-04 NOTE — ED ADULT NURSE NOTE - NSFALLHARMRISKINTERV_ED_ALL_ED
Assistance OOB with selected safe patient handling equipment if applicable/Assistance with ambulation/Communicate risk of Fall with Harm to all staff, patient, and family/Monitor gait and stability/Monitor for mental status changes and reorient to person, place, and time, as needed/Move patient closer to nursing station/within visual sight of ED staff/Provide visual cue: red socks, yellow wristband, yellow gown, etc/Reinforce activity limits and safety measures with patient and family/Toileting schedule using arm’s reach rule for commode and bathroom/Use of alarms - bed, stretcher, chair and/or video monitoring/Bed in lowest position, wheels locked, appropriate side rails in place/Call bell, personal items and telephone in reach/Instruct patient to call for assistance before getting out of bed/chair/stretcher/Non-slip footwear applied when patient is off stretcher/[place] to call system/Physically safe environment - no spills, clutter or unnecessary equipment/Purposeful Proactive Rounding/Room/bathroom lighting operational, light cord in reach

## 2024-12-04 NOTE — CONSULT NOTE ADULT - SUBJECTIVE AND OBJECTIVE BOX
p (1480)     HPI: 89M s/p Left minicrani for SDH w/ Dr. Godfrey 11/2/2024, p/f nursing home for increased agitation. CTH w/ small Left SDH improved c/t exit CTH, no significant mass effect/shift.     Exam: Awake, confused, oriented to self, PERRL, EOMI, no facial, FC, frail, BUE 4+/5, no drift, BLE 4+/5    =====================  PAST MEDICAL HISTORY   HLD (hyperlipidemia)    HTN (hypertension)    PVD (peripheral vascular disease)    CAD (coronary artery disease)    Atherosclerosis    Dementia      PAST SURGICAL HISTORY   No significant past surgical history      No Known Allergies      MEDICATIONS:  Antibiotics:    Neuro:    Other:      SOCIAL HISTORY:   Occupation:   Marital Status:     FAMILY HISTORY:  Family history of premature CAD (Father)        ROS: Negative except per HPI    LABS:  PT/INR - ( 04 Dec 2024 13:40 )   PT: 12.5 sec;   INR: 1.06 ratio         PTT - ( 04 Dec 2024 13:40 )  PTT:29.9 sec                        12.8   10.76 )-----------( 173      ( 04 Dec 2024 13:40 )             39.0     12-04    144  |  111[H]  |  44[H]  ----------------------------<  95  4.2   |  27  |  1.10    Ca    9.0      04 Dec 2024 13:40    TPro  7.0  /  Alb  3.1[L]  /  TBili  0.5  /  DBili  x   /  AST  37  /  ALT  33  /  AlkPhos  86  12-04

## 2024-12-05 DIAGNOSIS — Z98.890 OTHER SPECIFIED POSTPROCEDURAL STATES: Chronic | ICD-10-CM

## 2024-12-05 DIAGNOSIS — N30.90 CYSTITIS, UNSPECIFIED WITHOUT HEMATURIA: ICD-10-CM

## 2024-12-05 DIAGNOSIS — I10 ESSENTIAL (PRIMARY) HYPERTENSION: ICD-10-CM

## 2024-12-05 DIAGNOSIS — R64 CACHEXIA: ICD-10-CM

## 2024-12-05 DIAGNOSIS — G93.41 METABOLIC ENCEPHALOPATHY: ICD-10-CM

## 2024-12-05 DIAGNOSIS — Z51.5 ENCOUNTER FOR PALLIATIVE CARE: ICD-10-CM

## 2024-12-05 DIAGNOSIS — Z98.890 OTHER SPECIFIED POSTPROCEDURAL STATES: ICD-10-CM

## 2024-12-05 DIAGNOSIS — Z71.89 OTHER SPECIFIED COUNSELING: ICD-10-CM

## 2024-12-05 DIAGNOSIS — Z29.9 ENCOUNTER FOR PROPHYLACTIC MEASURES, UNSPECIFIED: ICD-10-CM

## 2024-12-05 LAB
ANION GAP SERPL CALC-SCNC: 16 MMOL/L — SIGNIFICANT CHANGE UP (ref 5–17)
BASOPHILS # BLD AUTO: 0.01 K/UL — SIGNIFICANT CHANGE UP (ref 0–0.2)
BASOPHILS NFR BLD AUTO: 0.1 % — SIGNIFICANT CHANGE UP (ref 0–2)
BUN SERPL-MCNC: 41 MG/DL — HIGH (ref 7–23)
CALCIUM SERPL-MCNC: 8.7 MG/DL — SIGNIFICANT CHANGE UP (ref 8.4–10.5)
CHLORIDE SERPL-SCNC: 109 MMOL/L — HIGH (ref 96–108)
CO2 SERPL-SCNC: 18 MMOL/L — LOW (ref 22–31)
CREAT SERPL-MCNC: 0.85 MG/DL — SIGNIFICANT CHANGE UP (ref 0.5–1.3)
EGFR: 79 ML/MIN/1.73M2 — SIGNIFICANT CHANGE UP
EOSINOPHIL # BLD AUTO: 0.02 K/UL — SIGNIFICANT CHANGE UP (ref 0–0.5)
EOSINOPHIL NFR BLD AUTO: 0.2 % — SIGNIFICANT CHANGE UP (ref 0–6)
GLUCOSE BLDC GLUCOMTR-MCNC: 89 MG/DL — SIGNIFICANT CHANGE UP (ref 70–99)
GLUCOSE BLDC GLUCOMTR-MCNC: 89 MG/DL — SIGNIFICANT CHANGE UP (ref 70–99)
GLUCOSE BLDC GLUCOMTR-MCNC: 97 MG/DL — SIGNIFICANT CHANGE UP (ref 70–99)
GLUCOSE SERPL-MCNC: 72 MG/DL — SIGNIFICANT CHANGE UP (ref 70–99)
HCT VFR BLD CALC: 31.6 % — LOW (ref 39–50)
HGB BLD-MCNC: 10.1 G/DL — LOW (ref 13–17)
IMM GRANULOCYTES NFR BLD AUTO: 0.4 % — SIGNIFICANT CHANGE UP (ref 0–0.9)
LYMPHOCYTES # BLD AUTO: 1.05 K/UL — SIGNIFICANT CHANGE UP (ref 1–3.3)
LYMPHOCYTES # BLD AUTO: 10.8 % — LOW (ref 13–44)
MCHC RBC-ENTMCNC: 30.7 PG — SIGNIFICANT CHANGE UP (ref 27–34)
MCHC RBC-ENTMCNC: 32 G/DL — SIGNIFICANT CHANGE UP (ref 32–36)
MCV RBC AUTO: 96 FL — SIGNIFICANT CHANGE UP (ref 80–100)
MONOCYTES # BLD AUTO: 0.55 K/UL — SIGNIFICANT CHANGE UP (ref 0–0.9)
MONOCYTES NFR BLD AUTO: 5.7 % — SIGNIFICANT CHANGE UP (ref 2–14)
NEUTROPHILS # BLD AUTO: 8.05 K/UL — HIGH (ref 1.8–7.4)
NEUTROPHILS NFR BLD AUTO: 82.8 % — HIGH (ref 43–77)
NRBC # BLD: 0 /100 WBCS — SIGNIFICANT CHANGE UP (ref 0–0)
PLATELET # BLD AUTO: 146 K/UL — LOW (ref 150–400)
POTASSIUM SERPL-MCNC: 4.4 MMOL/L — SIGNIFICANT CHANGE UP (ref 3.5–5.3)
POTASSIUM SERPL-SCNC: 4.4 MMOL/L — SIGNIFICANT CHANGE UP (ref 3.5–5.3)
RBC # BLD: 3.29 M/UL — LOW (ref 4.2–5.8)
RBC # FLD: 15.7 % — HIGH (ref 10.3–14.5)
SODIUM SERPL-SCNC: 143 MMOL/L — SIGNIFICANT CHANGE UP (ref 135–145)
WBC # BLD: 9.72 K/UL — SIGNIFICANT CHANGE UP (ref 3.8–10.5)
WBC # FLD AUTO: 9.72 K/UL — SIGNIFICANT CHANGE UP (ref 3.8–10.5)

## 2024-12-05 PROCEDURE — 99223 1ST HOSP IP/OBS HIGH 75: CPT

## 2024-12-05 PROCEDURE — 99221 1ST HOSP IP/OBS SF/LOW 40: CPT

## 2024-12-05 PROCEDURE — 99232 SBSQ HOSP IP/OBS MODERATE 35: CPT

## 2024-12-05 RX ORDER — ALPRAZOLAM 0.5 MG
1 TABLET ORAL
Refills: 0 | DISCHARGE

## 2024-12-05 RX ORDER — 0.9 % SODIUM CHLORIDE 0.9 %
1000 INTRAVENOUS SOLUTION INTRAVENOUS
Refills: 0 | Status: DISCONTINUED | OUTPATIENT
Start: 2024-12-05 | End: 2024-12-10

## 2024-12-05 RX ORDER — CEFTRIAXONE SODIUM 1 G
1000 VIAL (EA) INJECTION EVERY 24 HOURS
Refills: 0 | Status: COMPLETED | OUTPATIENT
Start: 2024-12-05 | End: 2024-12-07

## 2024-12-05 RX ORDER — L. ACIDOPHILUS/L.BULGARICUS 1MM CELL
1 TABLET ORAL
Refills: 0 | DISCHARGE

## 2024-12-05 RX ORDER — DOXAZOSIN MESYLATE 1 MG
2 TABLET ORAL AT BEDTIME
Refills: 0 | Status: DISCONTINUED | OUTPATIENT
Start: 2024-12-05 | End: 2024-12-10

## 2024-12-05 RX ORDER — GLUCAGON INJECTION, SOLUTION 0.5 MG/.1ML
1 INJECTION, SOLUTION SUBCUTANEOUS ONCE
Refills: 0 | Status: DISCONTINUED | OUTPATIENT
Start: 2024-12-05 | End: 2024-12-10

## 2024-12-05 RX ORDER — METOPROLOL TARTRATE 100 MG/1
25 TABLET, FILM COATED ORAL DAILY
Refills: 0 | Status: DISCONTINUED | OUTPATIENT
Start: 2024-12-05 | End: 2024-12-10

## 2024-12-05 RX ORDER — VALPROIC ACID 250 MG/5ML
250 SOLUTION ORAL EVERY 8 HOURS
Refills: 0 | Status: DISCONTINUED | OUTPATIENT
Start: 2024-12-05 | End: 2024-12-07

## 2024-12-05 RX ADMIN — VALPROIC ACID 250 MILLIGRAM(S): 250 SOLUTION ORAL at 21:26

## 2024-12-05 RX ADMIN — VALPROIC ACID 250 MILLIGRAM(S): 250 SOLUTION ORAL at 06:02

## 2024-12-05 RX ADMIN — Medication 100 MILLIGRAM(S): at 02:43

## 2024-12-05 RX ADMIN — VALPROIC ACID 250 MILLIGRAM(S): 250 SOLUTION ORAL at 13:07

## 2024-12-05 RX ADMIN — Medication 2 MILLIGRAM(S): at 21:26

## 2024-12-05 RX ADMIN — METOPROLOL TARTRATE 25 MILLIGRAM(S): 100 TABLET, FILM COATED ORAL at 06:01

## 2024-12-05 NOTE — H&P ADULT - NSHPSOURCEINFOTX_GEN_ALL_CORE
Unable to obtain history from patient.   The patient is alert and oriented to self.   Presently calm.    Poorly follow commands.  I left a general message with patient's daughter, Mathew Brennan, [phone], who was present earlier with the ED physician upon transfer from the [place] [facility].  [facility] [place]  contacted by me and awaiting  [employer] fax and [facility].

## 2024-12-05 NOTE — CONSULT NOTE ADULT - SUBJECTIVE AND OBJECTIVE BOX
Time and date of service: 12-05-24 @ 10:52      HPI:   89 y/o M--patient with moderate to severe cognitive impairment, history of essential HTN, with prior hospitalization in Aug 2024 for UTI/pyelo with apparent LEFT hydroureteronephrosis and S/P emergent cystoscopy and LEFT ureteral stent placement with patient S/P LEFT craniotomy and subdural haematoma evacuation 11/2/24 at [place] following initial confusional state, with course complicated with delirium and pulmonary oedema.   Echo at 11/3/24 with LV systolic function with mild to moderate decreased with EF of 40% with regional wall abnormalities present, with patient at the time maintained on valproic acid for mood stabilization (not for seizures) with Seroquel stopped at the time and reduction of Trazodone to 50 mg from 100 mg, with patient on Namenda.  Patient was seen by Palliative Care at [place] in Aug 2024 wit patient DNR/ Do Not Intubate per note but MOLST obtained from [facility] from [place] unable to ascertain selected option on form signed by the patient's daughter, Mathew Brennan.  I left a general message with the daughter by voice mail with my name and office number only.  Unable to obtain history from patient.    Patient is oriented to self/hospital (?) with patient receiving haloperidol earlier in the ER.  Patient presently calm and offers no complaint.    Patient with report of increased confusion at [facility] at [place]--[facility] [place] [facility]. (05 Dec 2024 02:11)    PERTINENT PM/SXH:   HLD (hyperlipidemia)    HTN (hypertension)    PVD (peripheral vascular disease)    CAD (coronary artery disease)    Atherosclerosis    Dementia      No significant past surgical history    S/P subdural hematoma evacuation      FAMILY HISTORY:  Family history of premature CAD (Father)      Family Hx substance abuse [ ]yes [ x]no    ITEMS NOT CHECKED ARE NOT PRESENT    SOCIAL HISTORY:   Significant other/partner[ ]x  Children[ ]  Adventism/Spirituality:  Substance hx:  [ ]   Tobacco hx:  [ ]   Alcohol hx: [ ]   Home Opioid hx:  [ ] I-Stop Reference No:  Living Situation: [ ]Home  [ x]Long term care  [ ]Rehab [ ]Other    ADVANCE DIRECTIVES:    DNR/MOLST  [ x]  Living Will  [ ]   DECISION MAKER(s):  [ ] Health Care Proxy(s)  [ ] Surrogate(s)  [ ] Guardian           Name(s): Phone Number(s): Mathew Brennan [phone]    BASELINE (I)ADL(s) (prior to admission):  [place]: [ ]Total  [ ] Moderate [x ]Dependent    Allergies    No Known Allergies    Intolerances    MEDICATIONS  (STANDING):  cefTRIAXone   IVPB 1000 milliGRAM(s) IV Intermittent every 24 hours  dextrose 5%. 1000 milliLiter(s) (100 mL/Hr) IV Continuous <Continuous>  dextrose 5%. 1000 milliLiter(s) (50 mL/Hr) IV Continuous <Continuous>  dextrose 50% Injectable 25 Gram(s) IV Push once  dextrose 50% Injectable 12.5 Gram(s) IV Push once  dextrose 50% Injectable 25 Gram(s) IV Push once  doxazosin 2 milliGRAM(s) Oral at bedtime  glucagon  Injectable 1 milliGRAM(s) IntraMuscular once  lactobacillus acidophilus 1 Tablet(s) Oral daily  metoprolol succinate ER 25 milliGRAM(s) Oral daily  valproic acid 250 milliGRAM(s) Oral every 8 hours    MEDICATIONS  (PRN):  dextrose Oral Gel 15 Gram(s) Oral once PRN Blood Glucose LESS THAN 70 milliGRAM(s)/deciliter    PRESENT SYMPTOMS: [x ]Unable to self-report  [ ] CPOT [ ] PAINADs [ ] RDOS  Source if other than patient:  [ ]Family   [ x]Team     Pain: [ ]yes [x ]no  QOL impact -   Location -                    Aggravating factors -  Quality -  Radiation -  Timing-  Severity (0-10 scale):  Minimal acceptable level (0-10 scale):     CPOT:    https://www.[facility].org/getattachment/[email or web address]/Critical-Care-Pain-Observation-Tool-(CPOT)    PAIN AD Score:   http://geriatrictoolkit.[place].[facility]/cog/painad.pdf (press ctrl +  left click to view)    Dyspnea:                           [ ]Mild [ ]Moderate [ ]Severe      RDOS:  0 to 2  minimal or no respiratory distress   3  mild distress  4 to 6 moderate distress  >7 severe distress  https://homecareinformation.net/handouts/hen/Respiratory_Distress_Observation_Scale.pdf (Ctrl +  left click to view)     Anxiety:                             [ ]Mild [ ]Moderate [ ]Severe  Fatigue:                             [ ]Mild [x ]Moderate [ ]Severe  Nausea:                             [ ]Mild [ ]Moderate [ ]Severe  Loss of appetite:               [ ]Mild [ x]Moderate [ ]Severe  Constipation:                    [ ]Mild [ ]Moderate [ ]Severe    PCSSQ[Palliative Care Spiritual Screening Question]   Severity (0-10):  Score of 4 or > indicate consideration of Chaplaincy referral.  Chaplaincy Referral: [ ] yes [ ] refused [ ] following [ x] Deferred     Caregiver [place]? : [ ] yes [ ] no [ x] Deferred [ ] Declined             Social work referral [ ] Patient & Family Centered Care Referral [ ]     Anticipatory Grief present?:  [ ] yes [ ] no  [x ] Deferred                  Social work referral [ ] Chaplaincy Referral[ ]      Other Symptoms:  [ ]All other review of systems negative     Palliative Performance Status Version 2:  30       %    http://[facility].org/files/news/palliative_performance_scale_ppsv2.pdf  PHYSICAL EXAM:  Vital Signs Last 24 Hrs  T(C): 36.6 (05 Dec 2024 04:41), Max: 37 (04 Dec 2024 13:54)  T(F): 97.8 (05 Dec 2024 04:41), Max: 98.6 (04 Dec 2024 13:54)  HR: 90 (05 Dec 2024 04:41) (72 - 90)  BP: 150/76 (05 Dec 2024 04:41) (132/71 - 169/96)  BP(mean): 97 (05 Dec 2024 00:20) (97 - 97)  RR: 17 (05 Dec 2024 04:41) (16 - 18)  SpO2: 96% (05 Dec 2024 04:41) (94% - 100%)    Parameters below as of 05 Dec 2024 04:41  Patient On (Oxygen Delivery Method): room air     I&O's Summary    04 Dec 2024 07:01  -  05 Dec 2024 07:00  --------------------------------------------------------  IN: 50 mL / OUT: 0 mL / NET: 50 mL      GENERAL: [ ]Cachexia    [x ]Alert  [x ]Oriented x 1  [ ]Lethargic  [ ]Unarousable  [ ]Verbal  [ ]Non-Verbal  Behavioral:   [ ] Anxiety  [x ] Delirium [ ] Agitation [ ] Other  HEENT:  [ ]Normal   [x ]Dry mouth   [ ]ET Tube/Trach  [ ]Oral lesions  PULMONARY:   [ ]Clear [ ]Tachypnea  [ ]Audible excessive secretions   [ ]Rhonchi        [ ]Right [ ]Left [ ]Bilateral  [ ]Crackles        [ ]Right [ ]Left [ ]Bilateral  [ ]Wheezing     [ ]Right [ ]Left [ ]Bilateral  [ x]Diminished breath sounds [ ]right [ ]left [ x]bilateral  CARDIOVASCULAR:    [x ]Regular [ ]Irregular [ ]Tachy  [ ]Dominic [ ]Murmur [ ]Other  GASTROINTESTINAL:  [x ]Soft  [ ]Distended   [ x]+BS  [x ]Non tender [ ]Tender  [ ]Other [ ]PEG [ ]OGT/ NGT  Last BM:  GENITOURINARY:  [ ]Normal [x ] Incontinent   [ ]Oliguria/Anuria   [ ]Quijano  MUSCULOSKELETAL:   [ ]Normal   [ x]Weakness  [x ]Bed/Wheelchair bound [ ]Edema  NEUROLOGIC:   [ ]No focal deficits  [ x]Cognitive impairment  [ ]Dysphagia [ ]Dysarthria [ ]Paresis [ ]Other   SKIN: see rn flow sheet  [ ]Normal  [ ]Rash  [ ]Other  [ ]Pressure ulcer(s)       Present on admission [ ]y [ ]n    CRITICAL CARE:  [ ]Shock Present  [ ]Septic [ ]Cardiogenic [ ]Neurologic [ ]Hypovolemic  [ ]  Vasopressors [ ]  Inotropes   [ ]Respiratory failure present [ ]Mechanical ventilation [ ]Non-invasive ventilatory support [ ]High flow    [ ]Acute  [ ]Chronic [ ]Hypoxic  [ ]Hypercarbic [ ]Other  [ ]Other organ failure     LABS:                        10.1   9.72  )-----------( 146      ( 05 Dec 2024 07:07 )             31.6   12-05    143  |  109[H]  |  41[H]  ----------------------------<  72  4.4   |  18[L]  |  0.85    Ca    8.7      05 Dec 2024 07:05    TPro  7.0  /  Alb  3.1[L]  /  TBili  0.5  /  DBili  x   /  AST  37  /  ALT  33  /  AlkPhos  86  12-04  PT/INR - ( 04 Dec 2024 13:40 )   PT: 12.5 sec;   INR: 1.06 ratio         PTT - ( 04 Dec 2024 13:40 )  PTT:29.9 sec    Urinalysis Basic - ( 05 Dec 2024 07:05 )    Color: x / Appearance: x / SG: x / pH: x  Gluc: 72 mg/dL / Ketone: x  / Bili: x / Urobili: x   Blood: x / Protein: x / Nitrite: x   Leuk Esterase: x / RBC: x / WBC x   Sq Epi: x / Non Sq Epi: x / Bacteria: x      RADIOLOGY & ADDITIONAL STUDIES:    < from: CT Head No Cont (12.04.24 @ 20:13) >  ACC: [id number] EXAM:  CT BRAIN   ORDERED BY: KRISTOPHER VELAZQUEZ     PROCEDURE DATE:  12/04/2024          INTERPRETATION:  CLINICAL INFORMATION: Follow-up intracranial hemorrhage   found on outside hospital CT.    COMPARISON: Same day CT head.    CONTRAST:  IV Contrast: NONE  .    TECHNIQUE:  Serial axial images were obtained from the skull base to the   vertex using multi-slice helical technique. Sagittal and coronal   reformats were obtained.    FINDINGS:    VENTRICLES AND SULCI: Moderate to severe generalized cerebral volume   loss, with distention of the sulci and concomitant ex-vacuo ventricular   dilatation.  INTRA-AXIAL: No mass effect, acute hemorrhage, or midline shift. Chronic   left thalamic lacunar infarct is stable. Moderate nonspecific low   attenuation in the periventricular and subcortical white matter, likely   due to small vessel disease. No CT evidence of acute infarction, although   MRI with DWI would be more sensitive.  EXTRA-AXIAL: The left frontal subdural fluid collection measures 1.5 cm   at its largest diameter, decreased from 1.7 cm at 3:00 PM (2, 12).    VISUALIZED SINUSES:  Clear.  TYMPANOMASTOID CAVITIES:  Clear.  VISUALIZED ORBITS: Bilateral lens replacement.  CALVARIUM: Left frontoparietal craniotomy.    MISCELLANEOUS: None.    IMPRESSION:    1.  No adverse change, without interval rebleeding.  2.  Chronic lacunar infarct and senescent cerebral changes.        --- End of Report ---          GAURI CARDONA MD; Resident Radiologist  This document has been electronically signed.  SUREKHA STEPHENS MD; Attending Radiologist  This document has been electronically signed. Dec  4 2024  9:03PM    < end of copied text >      PROTEIN CALORIE MALNUTRITION PRESENT: [ ]mild [ ]moderate [ ]severe [ ]underweight [ ]morbid obesity  https://www.andeal.org/vault/2440/web/files/ONC/Table_Clinical%20Characteristics%20to%20Document%20Malnutrition-White%20JV%20et%20al%202012.pdf    Height (cm): 177.8 (12-04-24 @ 17:07), 177.8 (12-04-24 @ 13:19), 177.8 (11-02-24 @ 08:46)  Weight (kg): 54.4 (12-04-24 @ 17:07), 54.7 (12-04-24 @ 13:19), 74.8 (11-02-24 @ 08:46)  BMI (kg/m2): 17.2 (12-04-24 @ 17:07), 17.3 (12-04-24 @ 13:19), 23.7 (11-02-24 @ 08:46)    [ ]PPSV2 < or = to 30% [ ]significant weight loss  [ ]poor nutritional intake  [ ]anasarca[ ]Artificial Nutrition      Other REFERRALS:  [ ]Hospice  [ ]Child Life  [ ]Social Work  [ ]Case management [ ]Holistic Therapy     Goals of Care Document: ANNA Caban (08-09-24 @ 12:57)  Goals of Care Conversation:   Participants:  · Participants  Family  · Child(jaycee)  Keny Mika/ Nickie Gallardo    Advance Directives:  · Does patient have Advance Directive  Yes  · Indicate Type  Health Care Proxy (HCP); Medical Orders for Life-Sustaining Treatment (MOLST); Power of   · Agent's Name  Mathew Brennan  · Phone #  [phone]  · Are any of the items on the chart  Yes  · Specify which ones are on chart  Health Care Proxy (HCP)  Medical Orders for Life-Sustaining Treatment (MOLST)  Power of  (POA) for Healthcare Issues    Conversation Discussion:  · Conversation  Diagnosis; Prognosis; MOLST Discussed  · Conversation Details  Writer met with pts dtr/HCP Mathew Brennan. Reviewed patient's medical and social history as well as events leading to patient's hospitalization. Writer discussed patient's current diagnosis (abd. pain, CAD, PVD, HTN, Dementia,UTI, L hydronephosis-ureteral stent.), medical condition and management, prognosis, and life expectancy. Inquired about patient's wishes regarding extent of medical care to be provided including escalation of medical care into the ICU and use of vasopressor support. In addition, the writer inquired about thoughts regarding cardiopulmonary resuscitation, artificial nutrition and hydration including use of feeding tubes and IVF, antibiotics, and further investigative studies such as blood draws and radiology. Mathew and her sister Nickie(on phone) agree and understand his condition.. All questions answered. Writer recommended MOLST. Mathew consented to DNR/DNI. MOLST form filled out and placed in chart. Psychosocial support provided.    Personal Advance Directives Treatment Guidelines:   Treatment Guidelines:  · Decision Maker  Health Care Proxy  · Treatment Guidelines  DNR; DNI    MOLST:  · Completed  09-Aug-2024    Location of Discussion:   Time Spent on Advance Care Planning:  Attending or CHIP Only.     I personally spent 20 minutes on advance care planning services with the patient. This time is separate and distinct from any other care management services provided on this date.    Location of Discussion:  · Location of discussion  Face to face      Electronic Signatures:  Agata Mares (RN)   (Signed 09-Aug-2024 13:02)  	Authored: Goals of Care Conversation, Personal Advance Directives Treatment Guidelines, Location of Discussion  Angela Ramos)   (Signed 09-Aug-2024 14:52)  	Co-Signer: Goals of Care Conversation, Personal Advance Directives Treatment Guidelines, Location of Discussion    Last Updated: 09-Aug-2024 14:52 by Angela Ramos)     Time and date of service: 12-05-24 @ 10:52      HPI:   89 y/o M--patient with moderate to severe cognitive impairment, history of essential HTN, with prior hospitalization in Aug 2024 for UTI/pyelo with apparent LEFT hydroureteronephrosis and S/P emergent cystoscopy and LEFT ureteral stent placement with patient S/P LEFT craniotomy and subdural haematoma evacuation 11/2/24 at [place] following initial confusional state, with course complicated with delirium and pulmonary oedema.   Echo at 11/3/24 with LV systolic function with mild to moderate decreased with EF of 40% with regional wall abnormalities present, with patient at the time maintained on valproic acid for mood stabilization (not for seizures) with Seroquel stopped at the time and reduction of Trazodone to 50 mg from 100 mg, with patient on Namenda.  Patient was seen by Palliative Care at [place] in Aug 2024 wit patient DNR/ Do Not Intubate per note but MOLST obtained from [facility] from [place] unable to ascertain selected option on form signed by the patient's daughter, Mathew Brennan.  I left a general message with the daughter by voice mail with my name and office number only.  Unable to obtain history from patient.    Patient is oriented to self/hospital (?) with patient receiving haloperidol earlier in the ER.  Patient presently calm and offers no complaint.    Patient with report of increased confusion at [facility] [place]--[facility] [place] [facility]. (05 Dec 2024 02:11)    PERTINENT PM/SXH:   HLD (hyperlipidemia)    HTN (hypertension)    PVD (peripheral vascular disease)    CAD (coronary artery disease)    Atherosclerosis    Dementia      No significant past surgical history    S/P subdural hematoma evacuation      FAMILY HISTORY:  Family history of premature CAD (Father)      Family Hx substance abuse [ ]yes [ x]no    ITEMS NOT CHECKED ARE NOT PRESENT    SOCIAL HISTORY:   Significant other/partner[ ]x  Children[ ]  Rastafari/Spirituality:  Substance hx:  [ ]   Tobacco hx:  [ ]   Alcohol hx: [ ]   Home Opioid hx:  [ ] I-Stop Reference No:  Living Situation: [ ]Home  [ x]Long term care  [ ]Rehab [ ]Other    ADVANCE DIRECTIVES:    DNR/MOLST  [ x]  Living Will  [ ]   DECISION MAKER(s):  [ ] Health Care Proxy(s)  [ ] Surrogate(s)  [ ] Guardian           Name(s): Phone Number(s): Mathew Brennan [phone]    BASELINE (I)ADL(s) (prior to admission):  [place]: [ ]Total  [ ] Moderate [x ]Dependent    Allergies    No Known Allergies    Intolerances    MEDICATIONS  (STANDING):  cefTRIAXone   IVPB 1000 milliGRAM(s) IV Intermittent every 24 hours  dextrose 5%. 1000 milliLiter(s) (100 mL/Hr) IV Continuous <Continuous>  dextrose 5%. 1000 milliLiter(s) (50 mL/Hr) IV Continuous <Continuous>  dextrose 50% Injectable 25 Gram(s) IV Push once  dextrose 50% Injectable 12.5 Gram(s) IV Push once  dextrose 50% Injectable 25 Gram(s) IV Push once  doxazosin 2 milliGRAM(s) Oral at bedtime  glucagon  Injectable 1 milliGRAM(s) IntraMuscular once  lactobacillus acidophilus 1 Tablet(s) Oral daily  metoprolol succinate ER 25 milliGRAM(s) Oral daily  valproic acid 250 milliGRAM(s) Oral every 8 hours    MEDICATIONS  (PRN):  dextrose Oral Gel 15 Gram(s) Oral once PRN Blood Glucose LESS THAN 70 milliGRAM(s)/deciliter    PRESENT SYMPTOMS: [x ]Unable to self-report  [ ] CPOT [ ] PAINADs [ ] RDOS  Source if other than patient:  [ ]Family   [ x]Team     Pain: [ ]yes [x ]no  QOL impact -   Location -                    Aggravating factors -  Quality -  Radiation -  Timing-  Severity (0-10 scale):  Minimal acceptable level (0-10 scale):     CPOT:    https://www.[facility].org/getattachment/[email or web address]/Critical-Care-Pain-Observation-Tool-(CPOT)    PAIN AD Score:   http://geriatrictoolkit.[place].[facility]/cog/painad.pdf (press ctrl +  left click to view)    Dyspnea:                           [ ]Mild [ ]Moderate [ ]Severe      RDOS:  0 to 2  minimal or no respiratory distress   3  mild distress  4 to 6 moderate distress  >7 severe distress  https://homecareinformation.net/handouts/hen/Respiratory_Distress_Observation_Scale.pdf (Ctrl +  left click to view)     Anxiety:                             [ ]Mild [ ]Moderate [ ]Severe  Fatigue:                             [ ]Mild [x ]Moderate [ ]Severe  Nausea:                             [ ]Mild [ ]Moderate [ ]Severe  Loss of appetite:               [ ]Mild [ x]Moderate [ ]Severe  Constipation:                    [ ]Mild [ ]Moderate [ ]Severe    PCSSQ[Palliative Care Spiritual Screening Question]   Severity (0-10):  Score of 4 or > indicate consideration of Chaplaincy referral.  Chaplaincy Referral: [ ] yes [ ] refused [ ] following [ x] Deferred     Caregiver [place]? : [ ] yes [ ] no [ x] Deferred [ ] Declined             Social work referral [ ] Patient & Family Centered Care Referral [ ]     Anticipatory Grief present?:  [ ] yes [ ] no  [x ] Deferred                  Social work referral [ ] Chaplaincy Referral[ ]      Other Symptoms:  [ ]All other review of systems negative     Palliative Performance Status Version 2:  30       %    http://[facility].org/files/news/palliative_performance_scale_ppsv2.pdf  PHYSICAL EXAM:  Vital Signs Last 24 Hrs  T(C): 36.6 (05 Dec 2024 04:41), Max: 37 (04 Dec 2024 13:54)  T(F): 97.8 (05 Dec 2024 04:41), Max: 98.6 (04 Dec 2024 13:54)  HR: 90 (05 Dec 2024 04:41) (72 - 90)  BP: 150/76 (05 Dec 2024 04:41) (132/71 - 169/96)  BP(mean): 97 (05 Dec 2024 00:20) (97 - 97)  RR: 17 (05 Dec 2024 04:41) (16 - 18)  SpO2: 96% (05 Dec 2024 04:41) (94% - 100%)    Parameters below as of 05 Dec 2024 04:41  Patient On (Oxygen Delivery Method): room air     I&O's Summary    04 Dec 2024 07:01  -  05 Dec 2024 07:00  --------------------------------------------------------  IN: 50 mL / OUT: 0 mL / NET: 50 mL      GENERAL: [ ]Cachexia    [x ]Alert  [x ]Oriented x 1  [ ]Lethargic  [ ]Unarousable  [ ]Verbal  [ ]Non-Verbal  Behavioral:   [ ] Anxiety  [x ] Delirium [ ] Agitation [ ] Other  HEENT:  [ ]Normal   [x ]Dry mouth   [ ]ET Tube/Trach  [ ]Oral lesions  PULMONARY:   [ ]Clear [ ]Tachypnea  [ ]Audible excessive secretions   [ ]Rhonchi        [ ]Right [ ]Left [ ]Bilateral  [ ]Crackles        [ ]Right [ ]Left [ ]Bilateral  [ ]Wheezing     [ ]Right [ ]Left [ ]Bilateral  [ x]Diminished breath sounds [ ]right [ ]left [ x]bilateral  CARDIOVASCULAR:    [x ]Regular [ ]Irregular [ ]Tachy  [ ]Dominic [ ]Murmur [ ]Other  GASTROINTESTINAL:  [x ]Soft  [ ]Distended   [ x]+BS  [x ]Non tender [ ]Tender  [ ]Other [ ]PEG [ ]OGT/ NGT  Last BM:  GENITOURINARY:  [ ]Normal [x ] Incontinent   [ ]Oliguria/Anuria   [ ]Quijano  MUSCULOSKELETAL:   [ ]Normal   [ x]Weakness  [x ]Bed/Wheelchair bound [ ]Edema  NEUROLOGIC:   [ ]No focal deficits  [ x]Cognitive impairment  [ ]Dysphagia [ ]Dysarthria [ ]Paresis [ ]Other   SKIN: see rn flow sheet  [ ]Normal  [ ]Rash  [ ]Other  [ ]Pressure ulcer(s)       Present on admission [ ]y [ ]n    CRITICAL CARE:  [ ]Shock Present  [ ]Septic [ ]Cardiogenic [ ]Neurologic [ ]Hypovolemic  [ ]  Vasopressors [ ]  Inotropes   [ ]Respiratory failure present [ ]Mechanical ventilation [ ]Non-invasive ventilatory support [ ]High flow    [ ]Acute  [ ]Chronic [ ]Hypoxic  [ ]Hypercarbic [ ]Other  [ ]Other organ failure     LABS:                        10.1   9.72  )-----------( 146      ( 05 Dec 2024 07:07 )             31.6   12-05    143  |  109[H]  |  41[H]  ----------------------------<  72  4.4   |  18[L]  |  0.85    Ca    8.7      05 Dec 2024 07:05    TPro  7.0  /  Alb  3.1[L]  /  TBili  0.5  /  DBili  x   /  AST  37  /  ALT  33  /  AlkPhos  86  12-04  PT/INR - ( 04 Dec 2024 13:40 )   PT: 12.5 sec;   INR: 1.06 ratio         PTT - ( 04 Dec 2024 13:40 )  PTT:29.9 sec    Urinalysis Basic - ( 05 Dec 2024 07:05 )    Color: x / Appearance: x / SG: x / pH: x  Gluc: 72 mg/dL / Ketone: x  / Bili: x / Urobili: x   Blood: x / Protein: x / Nitrite: x   Leuk Esterase: x / RBC: x / WBC x   Sq Epi: x / Non Sq Epi: x / Bacteria: x      RADIOLOGY & ADDITIONAL STUDIES:    < from: CT Head No Cont (12.04.24 @ 20:13) >  ACC: [id number] EXAM:  CT BRAIN   ORDERED BY: KRISTOPHER VELAZQUEZ     PROCEDURE DATE:  12/04/2024          INTERPRETATION:  CLINICAL INFORMATION: Follow-up intracranial hemorrhage   found on outside hospital CT.    COMPARISON: Same day CT head.    CONTRAST:  IV Contrast: NONE  .    TECHNIQUE:  Serial axial images were obtained from the skull base to the   vertex using multi-slice helical technique. Sagittal and coronal   reformats were obtained.    FINDINGS:    VENTRICLES AND SULCI: Moderate to severe generalized cerebral volume   loss, with distention of the sulci and concomitant ex-vacuo ventricular   dilatation.  INTRA-AXIAL: No mass effect, acute hemorrhage, or midline shift. Chronic   left thalamic lacunar infarct is stable. Moderate nonspecific low   attenuation in the periventricular and subcortical white matter, likely   due to small vessel disease. No CT evidence of acute infarction, although   MRI with DWI would be more sensitive.  EXTRA-AXIAL: The left frontal subdural fluid collection measures 1.5 cm   at its largest diameter, decreased from 1.7 cm at 3:00 PM (2, 12).    VISUALIZED SINUSES:  Clear.  TYMPANOMASTOID CAVITIES:  Clear.  VISUALIZED ORBITS: Bilateral lens replacement.  CALVARIUM: Left frontoparietal craniotomy.    MISCELLANEOUS: None.    IMPRESSION:    1.  No adverse change, without interval rebleeding.  2.  Chronic lacunar infarct and senescent cerebral changes.        --- End of Report ---          GAURI CARDONA MD; Resident Radiologist  This document has been electronically signed.  SUREKHA STEPHENS MD; Attending Radiologist  This document has been electronically signed. Dec  4 2024  9:03PM    < end of copied text >      PROTEIN CALORIE MALNUTRITION PRESENT: [ ]mild [ ]moderate [ ]severe [ ]underweight [ ]morbid obesity  https://www.andeal.org/vault/2440/web/files/ONC/Table_Clinical%20Characteristics%20to%20Document%20Malnutrition-White%20JV%20et%20al%202012.pdf    Height (cm): 177.8 (12-04-24 @ 17:07), 177.8 (12-04-24 @ 13:19), 177.8 (11-02-24 @ 08:46)  Weight (kg): 54.4 (12-04-24 @ 17:07), 54.7 (12-04-24 @ 13:19), 74.8 (11-02-24 @ 08:46)  BMI (kg/m2): 17.2 (12-04-24 @ 17:07), 17.3 (12-04-24 @ 13:19), 23.7 (11-02-24 @ 08:46)    [ ]PPSV2 < or = to 30% [ ]significant weight loss  [ ]poor nutritional intake  [ ]anasarca[ ]Artificial Nutrition      Other REFERRALS:  [ ]Hospice  [ ]Child Life  [ ]Social Work  [ ]Case management [ ]Holistic Therapy     Goals of Care Document: ANNA Caban (08-09-24 @ 12:57)  Goals of Care Conversation:   Participants:  · Participants  Family  · Child(jaycee)  Mathew Brennan/ Nickie Gallardo    Advance Directives:  · Does patient have Advance Directive  Yes  · Indicate Type  Health Care Proxy (HCP); Medical Orders for Life-Sustaining Treatment (MOLST); Power of   · Agent's Name  Mathew Brennan  · Phone #  [phone]  · Are any of the items on the chart  Yes  · Specify which ones are on chart  Health Care Proxy (HCP)  Medical Orders for Life-Sustaining Treatment (MOLST)  Power of  (POA) for Healthcare Issues    Conversation Discussion:  · Conversation  Diagnosis; Prognosis; MOLST Discussed  · Conversation Details  Writer met with pts dtr/HCP Mathew Brennan. Reviewed patient's medical and social history as well as events leading to patient's hospitalization. Writer discussed patient's current diagnosis (abd. pain, CAD, PVD, HTN, Dementia,UTI, L hydronephosis-ureteral stent.), medical condition and management, prognosis, and life expectancy. Inquired about patient's wishes regarding extent of medical care to be provided including escalation of medical care into the ICU and use of vasopressor support. In addition, the writer inquired about thoughts regarding cardiopulmonary resuscitation, artificial nutrition and hydration including use of feeding tubes and IVF, antibiotics, and further investigative studies such as blood draws and radiology. Mathew and her sister Nickie(on phone) agree and understand his condition.. All questions answered. Writer recommended MOLST. Mathew consented to DNR/DNI. MOLST form filled out and placed in chart. Psychosocial support provided.    Personal Advance Directives Treatment Guidelines:   Treatment Guidelines:  · Decision Maker  Health Care Proxy  · Treatment Guidelines  DNR; DNI    MOLST:  · Completed  09-Aug-2024

## 2024-12-05 NOTE — CONSULT NOTE ADULT - SUBJECTIVE AND OBJECTIVE BOX
CHIEF COMPLAINT:    HISTORY OF PRESENT ILLNESS:  89 y/o M--patient with moderate to severe cognitive impairment, history of essential HTN, with prior hospitalization in Aug 2024 for UTI/pyelo with apparent LEFT hydroureteronephrosis and S/P emergent cystoscopy and LEFT ureteral stent placement with patient S/P LEFT craniotomy and subdural haematoma evacuation 11/2/24 at [place] following initial confusional state, with course complicated with delirium and pulmonary oedema.   Echo at 11/3/24 with LV systolic function with mild to moderate decreased with EF of 40% with regional wall abnormalities present, with patient at the time maintained on valproic acid for mood stabilization (not for seizures) with Seroquel stopped at the time and reduction of Trazodone to 50 mg from 100 mg, with patient on Namenda.  Patient was seen by Palliative Care at [facility] in Aug 2024 wit patient DNR/ Do Not Intubate per note but MOLST obtained from [facility] from [facility] unable to ascertain selected option on form signed by the patient's daughter, Mathew Brennan.  I left a general message with the daughter by voice mail with my name and office number only.  Unable to obtain history from patient.    Patient is oriented to self/hospital (?) with patient receiving haloperidol earlier in the ER.  Patient presently calm and offers no complaint.    Patient with report of increased confusion at [facility] at [facility]--was transferred following course at [facility] following [place] discharge.      PAST MEDICAL & SURGICAL HISTORY:  HLD (hyperlipidemia)      HTN (hypertension)      PVD (peripheral vascular disease)      CAD (coronary artery disease)      Atherosclerosis      Dementia      S/P subdural hematoma evacuation              MEDICATIONS:  doxazosin 2 milliGRAM(s) Oral at bedtime  metoprolol succinate ER 25 milliGRAM(s) Oral daily    cefTRIAXone   IVPB 1000 milliGRAM(s) IV Intermittent every 24 hours      valproic acid 250 milliGRAM(s) Oral every 8 hours      dextrose 50% Injectable 25 Gram(s) IV Push once  dextrose 50% Injectable 12.5 Gram(s) IV Push once  dextrose 50% Injectable 25 Gram(s) IV Push once  dextrose Oral Gel 15 Gram(s) Oral once PRN  glucagon  Injectable 1 milliGRAM(s) IntraMuscular once    dextrose 5%. 1000 milliLiter(s) IV Continuous <Continuous>  dextrose 5%. 1000 milliLiter(s) IV Continuous <Continuous>      FAMILY HISTORY:  Family history of premature CAD (Father)        SOCIAL HISTORY:    [ ] Non-smoker  [ ] Smoker  [ ] Alcohol    Allergies    No Known Allergies    Intolerances    	    REVIEW OF SYSTEMS:  CONSTITUTIONAL: No fever, weight loss, + fatigue  EYES: No eye pain, visual disturbances, or discharge  ENMT:  No difficulty hearing, tinnitus, vertigo; No sinus or throat pain  NECK: No pain or stiffness  RESPIRATORY: No cough, wheezing, chills or hemoptysis; No Shortness of Breath  CARDIOVASCULAR: No chest pain, palpitations, passing out, dizziness, or leg swelling  GASTROINTESTINAL: No abdominal or epigastric pain. No nausea, vomiting, or hematemesis; No diarrhea or constipation. No melena or hematochezia.  GENITOURINARY: No dysuria, frequency, hematuria, or incontinence  NEUROLOGICAL: No headaches, memory loss, loss of strength, numbness, or tremors  SKIN: No itching, burning, rashes, or lesions   LYMPH Nodes: No enlarged glands  ENDOCRINE: No heat or cold intolerance; No hair loss  MUSCULOSKELETAL: No joint pain or swelling; No muscle, back, or extremity pain  PSYCHIATRIC: No depression, anxiety, mood swings, or difficulty sleeping  HEME/LYMPH: No easy bruising, or bleeding gums  ALLERY AND IMMUNOLOGIC: No hives or eczema	    [ ] All others negative	  [ ] Unable to obtain    PHYSICAL EXAM:  T(C): 36.6 (12-05-24 @ 04:41), Max: 37 (12-04-24 @ 13:54)  HR: 90 (12-05-24 @ 04:41) (72 - 90)  BP: 150/76 (12-05-24 @ 04:41) (132/71 - 169/96)  RR: 17 (12-05-24 @ 04:41) (16 - 18)  SpO2: 96% (12-05-24 @ 04:41) (94% - 100%)  Wt(kg): --  I&O's Summary    04 Dec 2024 07:01  -  05 Dec 2024 07:00  --------------------------------------------------------  IN: 50 mL / OUT: 0 mL / NET: 50 mL        Appearance: NAD 1:1  HEENT:   Normal oral mucosa, PERRL, EOMI	  Lymphatic: No lymphadenopathy  Cardiovascular: Normal S1 S2, No JVD, No murmurs, No edema  Respiratory: decreased bs  Psychiatry: A & O x 3, sleepy   Gastrointestinal:  Soft, Non-tender, + BS	  Skin: No rashes, No ecchymoses, No cyanosis	  Neurologic: Awake, confused, oriented to self, PERRL, EOMI, no facial, FC, frail, BUE 4+/5, no drift, BLE 4+/5    Extremities: Normal range of motion, No clubbing, cyanosis or edema  Vascular: Peripheral pulses palpable 2+ bilaterally    TELEMETRY: 	    ECG:  	  RADIOLOGY:  < from: CT Head No Cont (12.04.24 @ 20:13) >  ACC: [id number] EXAM:  CT BRAIN   ORDERED BY: KRISTOPHER VELAZQUEZ     PROCEDURE DATE:  12/04/2024          INTERPRETATION:  CLINICAL INFORMATION: Follow-up intracranial hemorrhage   found on outside hospital CT.    COMPARISON: Same day CT head.    CONTRAST:  IV Contrast: NONE  .    TECHNIQUE:  Serial axial images were obtained from the skull base to the   vertex using multi-slice helical technique. Sagittal and coronal   reformats were obtained.    FINDINGS:    VENTRICLES AND SULCI: Moderate to severe generalized cerebral volume   loss, with distention of the sulci and concomitant ex-vacuo ventricular   dilatation.  INTRA-AXIAL: No mass effect, acute hemorrhage, or midline shift. Chronic   left thalamic lacunar infarct is stable. Moderate nonspecific low   attenuation in the periventricular and subcortical white matter, likely   due to small vessel disease. No CT evidence of acute infarction, although   MRI with DWI would be more sensitive.  EXTRA-AXIAL: The left frontal subdural fluid collection measures 1.5 cm   at its largest diameter, decreased from 1.7 cm at 3:00 PM (2, 12).    VISUALIZED SINUSES:  Clear.  TYMPANOMASTOID CAVITIES:  Clear.  VISUALIZED ORBITS: Bilateral lens replacement.  CALVARIUM: Left frontoparietal craniotomy.    MISCELLANEOUS: None.    IMPRESSION:    1.  No adverse change, without interval rebleeding.  2.  Chronic lacunar infarct and senescent cerebral changes.        --- End of Report ---      < end of copied text >    OTHER: 	  	  LABS:	 	    CARDIAC MARKERS:        < from: TTE Limited W or WO Ultrasound Enhancing Agent (11.03.24 @ 09:35) >    TRANSTHORACIC ECHOCARDIOGRAM REPORT  ________________________________________________________________________________                                      _______       Pt. Name:       TERRI CARRIZALES Study Date:    11/3/2024  MRN:            [id number]   YOB: 1926  Accession #:    [id number]    Age:           89 years  Account#:       [id number] Gender:        M  Heart Rate:                  Height:        70.00 in (177.80 cm)  Rhythm:                      Weight:        164.00 lb(74.39 kg)  Blood Pressure: 114/58 mmHg  BSA/BMI:       1.92 m² / 23.53 kg/m²  ________________________________________________________________________________________  Referring Physician:    [id number] Ulises Godfrey  Interpreting Physician: Gifty Guaman  Primary Sonographer:    Mitchell Wright CS    CPT:               ECHO TTE WO CON COMP W DOPP - 02892.m  Indication(s):     Cardiomyopathy, unspecified - I42.9  Procedure:         Transthoracic echocardiogram with 2-D, M-mode and complete                     spectral and color flow Doppler.  [place] [facility] [place] Status:  Inpatient  Study Information: Image quality for this study is adequate.    _______________________________________________________________________________________     CONCLUSIONS:      1. Left ventricular systolic function is mildly to moderately decreased with an ejection fraction visually estimated at 40 to 45 %. Regional wall motion abnormalities present.   2. There is moderate (grade 2) left ventricular diastolic dysfunction, with elevated left ventricular filling pressure.   3. Left atrium is mildly dilated.   4. Moderate mitral regurgitation.   5. Aortic root at the sinuses of Valsalva is dilated, measuring 3.80 cm (indexed 1.98 cm/m²).  6. Bilateral pleural effusion noted.   7. Estimated pulmonary artery systolic pressure is 44 mmHg, consistent with elevated pulmonary artery pressure.    ________________________________________________________________________________________  FINDINGS:     Left Ventricle:  The left ventricular cavity is normal in size. Left ventricular systolic function is mildly to moderately decreased with a calculated ejection fraction of 40 % by the Suazo's biplane method of disks with an ejection fractionvisually estimated at 40 to 45%. There are regional wall motion abnormalities present. Left ventricular regional wall motion assessment reveals hypokinesis of the lateral, apical lateral and inferior walls. The inferolateral wall appears thinned relative to the other wall segments suggestive of previous subendocardial infarction. There is moderate (grade 2) left ventricular diastolic dysfunction, with elevated left ventricular filling pressure.     Right Ventricle:  The right ventricle is not well visualized. The right ventricular cavity is normal in size and right ventricular systolic function is normal.     Left Atrium:  The left atrium is mildly dilated with an indexed volume of 41.18 ml/m².     Right Atrium:  The right atrium is normal insize with an indexed volume of 20.33 ml/m².     Aortic Valve:  The aortic valve appears trileaflet. There is calcification of the aortic valve leaflets. There is moderate thickening of the aortic valve leaflets. There is fibrocalcific aortic valve sclerosis without stenosis. There is trace aortic regurgitation.     Mitral Valve:  Thickened mitral valve leaflets. There is no mitral valve stenosis. There is moderate mitral regurgitation.     Tricuspid Valve:  Structurally normal tricuspid valve with normal leaflet excursion. There is mild tricuspid regurgitation. Estimated pulmonary artery systolic pressure is 44 mmHg, consistent with elevated pulmonary artery pressure.     Pulmonic Valve:  There is mild thickening of the pulmonic valve leaflets. There is no pulmonic valve stenosis. There is trace pulmonic regurgitation. Estimated PA diastolic pressure is 13 mmHg.     Aorta:  The proximal aorta is not well visualized. The aortic root at the sinuses of Valsalva is dilated, measuring 3.80 cm (indexed 1.98 cm/m²).     Pericardium:  There is a trace pericardial effusion.     Pleura:  Bilateral pleural effusion noted.     Systemic Veins:  The inferior vena cava is normal in size measuring 1.74 cm in diameter, (normal <2.1cm) with abnormal inspiratory collapse (abnormal <50%) consistent with mildly elevated right atrial pressure (~8, range 5-10mmHg).  ____________________________________________________________________    < end of copied text >    UA at [facility] with large leukocyte but neg nitrite.                        10.1   9.72  )-----------( 146      ( 05 Dec 2024 07:07 )             31.6     12-05    143  |  109[H]  |  41[H]  ----------------------------<  72  4.4   |  18[L]  |  0.85    Ca    8.7      05 Dec 2024 07:05    TPro  7.0  /  Alb  3.1[L]  /  TBili  0.5  /  DBili  x   /  AST  37  /  ALT  33  /  AlkPhos  86  12-04    proBNP:   Lipid Profile:   HgA1c:   TSH:

## 2024-12-05 NOTE — PATIENT PROFILE ADULT - NS PRO AD PATIENT TYPE
unclear if DNR or CPR order marked, DNI not filled out unclear if DNR or CPR order marked, DNI not filled out/Health Care Proxy (HCP)/Medical Orders for Life-Sustaining Treatment (MOLST)

## 2024-12-05 NOTE — H&P ADULT - NSHPLABSRESULTS_GEN_ALL_CORE
EKG tracing interpreted with NSR at 76 with nonspecific T changes.    Chest radiograph with + cardiomegaly,.    NO infiltrate or effusion.  \WBC 10.7     81N    Hgb 12.8    Platelets of 173k    Random glucose of 95    Cr 1.1    K+ 4.2\    Alb 3.1    UA at [place] with large leukocyte but neg nitrite. EKG tracing interpreted with NSR at 76 with nonspecific T changes.    Chest radiograph with + cardiomegaly,.    NO infiltrate or effusion.  \WBC 10.7     81N    Hgb 12.8    Platelets of 173k    Random glucose of 95    Cr 1.1    K+ 4.2\    Alb 3.1    UA at [place] with large leukocyte but neg nitrite.    CTT brain repeat at [place] and compared to the [place] ED study with NO adverse change without interval rebleeding, chronic lacunar infarct. LEFT frontal subdural collection 1.5 cm reduced from 1.7cm.

## 2024-12-05 NOTE — H&P ADULT - ASSESSMENT
NIGHT HOSPITALIST:    NIGHT HOSPITALIST:      Transfer from [facility] to [place] for concern for subdural hematoma with past evacuation but repeat CTT with NO interval bleeding.    Patient on IV Rocephin and will continue for now for presumed cystitis.   Follow urine culture.   Suspect initial delirium is related to polypharmacy--will temporarily HOLD the Xanax, Namenda, Seroquel and  Trazodone--will defer to the Primary Provider to review individual resumption of Rx due to BEERS risk.   Will cautiously continue the valproic acid for behavior modulation as tolerated.    Will assume aspiration risk for now.   S/S evaluation.   FS to monitor for hypoglycemia.    Patient with prior DNR/ Do Not Intubate orders per prior notes but MOLST from [facility] in the chart and is not clear on review option that was completed when signed by the patient's surrogate.  I left a general message with the daughter with my name and service number only.   Palliative Care consult requested.    Will continue with IV Rocephin for now.   Follow urine culture.    Will continue with patient's metoprolol and doxazosin for HTN.    Will defer pharmacologic DVT prophylaxis with intracranial process.     NIGHT HOSPITALIST:      Transfer from [facility] to [place] for concern for subdural hematoma with past evacuation but repeat CTT with NO interval bleeding.    Patient on IV Rocephin and will continue for now for presumed cystitis.   Follow urine culture.   Suspect initial delirium is related to polypharmacy--will temporarily HOLD the Xanax, Namenda, Seroquel and  Trazodone--will defer to the Primary Provider to review individual resumption of Rx due to BEERS risk.   Will cautiously continue the valproic acid for behavior modulation as tolerated.    Will assume aspiration risk for now.   S/S evaluation.   FS to monitor for hypoglycemia.    Patient with prior DNR/ Do Not Intubate orders per prior notes but MOLST from [facility] in the chart and is not clear on review option that was completed when signed by the patient's surrogate.  I left a general message with the daughter with my name and service number only.   Palliative Care consult requested.    Will continue with IV Rocephin for now.   Follow urine culture.    Will continue with patient's metoprolol and doxazosin for HTN.    Will defer pharmacologic DVT prophylaxis with intracranial process.   AURA for now.

## 2024-12-05 NOTE — PATIENT PROFILE ADULT - FALL HARM RISK - HARM RISK INTERVENTIONS
Assistance OOB with selected safe patient handling equipment/Communicate Risk of Fall with Harm to all staff/Discuss with provider need for PT consult/Monitor for mental status changes/Monitor gait and stability/Move patient closer to nurses' station/Provide patient with walking aids - walker, cane, crutches/Reinforce activity limits and safety measures with patient and family/Reorient to person, place and time as needed/Tailored Fall Risk Interventions/Toileting schedule using arm’s reach rule for commode and bathroom/Use of alarms - bed, chair and/or voice tab/Visual Cue: Yellow wristband and red socks/Bed in lowest position, wheels locked, appropriate side rails in place/Call bell, personal items and telephone in reach/Instruct patient to call for assistance before getting out of bed or chair/Non-slip footwear when patient is out of bed/[place] to call system/Physically safe environment - no spills, clutter or unnecessary equipment/Purposeful Proactive Rounding/Room/bathroom lighting operational, light cord in reach

## 2024-12-05 NOTE — CHART NOTE - NSCHARTNOTEFT_GEN_A_CORE
Pt seen and examined bedside . Pt seen [place] by my associate   Pt very restless  psych consult  seen by neurosurgery  Repeat CT head stable   palliative consult  Prognosis guarded

## 2024-12-05 NOTE — H&P ADULT - MENTAL STATUS
Alert, oriented to self/ hospital (?).   Speech fluent.   Short term memory impairment.   Calm, but easily distracted.  No need for prompting by examiner.

## 2024-12-05 NOTE — CONSULT NOTE ADULT - ASSESSMENT
Impression:    Sacral/bilateral buttocks deep tissue injury present on admission  Incontinence of bowel and bladder  Incontinence Dermatitis    Recommend:  1.) topical therapy: Sacral/bilateral buttocks - cleanse with incontinence cleanser, pat dry, apply Cora ointment BID and PRN for incontinent episodes  2.) Incontinence Management - incontinence cleanser, pads, pericare BID  3.) Maintain on an alternating air with low air loss surface  4.) Turn and reposition Q 2 hours  5.) Nutrition optimization   6.) Offload heels/feet with complete cair air fluidized boots/pillows; ensure that the soles of the feet are not resting on the foot board of the bed.  7.) chair cushion for chair sitting  8.) Foot wound per Podiatry    Care as per medicine. Will not actively follow but will remain available. Please recall for new issues or deterioration.  Upon discharge [facility] [address] [place] [address] [phone]  Thank you for this consult  Marlene Thompson, CHUCKIE-C, CWOCN via TEAMS

## 2024-12-05 NOTE — CONSULT NOTE ADULT - PROBLEM SELECTOR RECOMMENDATION 4
kps 30%    Spoke with team regarding MOLST. DNR/DNI order placed in chart. MOLST placed in physical chart. Palliative care will sign off please reconsult if necessary.     If goals align with comfort would Recommend:  Dilaudid 0.5 mg IV q2h prn dyspnea or tachypnea  Dilaudid 0.5 mg IV q2h prn severe pain and Dilaudid 0.2 mg IV q2h prn moderate pain  Ativan 0.2 mg IV q2h prn agitation   Glycopyrrolate 0.4 mg IV q6h prn copious oral secretions   Dulcolax suppository daily prn constipation   Acetaminophen suppository 650 mg q6h prn fever  Zofran 4 mg IV q8h prn nausea or vomiting    Recommend discontinuation of all medications and interventions that do not serve goal of promoting patient's comfort and dignity at end-of-life.    Please page for any acute symptoms or further questions or concerns. kps 30%    Spoke with team regarding MOLST.  DNR/DNI order placed in chart. MOLST placed in physical chart. Palliative care will sign off please reconsult if necessary. GOC/ACP discussed during previous hospitalization and imported in to document for reference.     If goals align with comfort would Recommend:  Dilaudid 0.5 mg IV q2h prn dyspnea or tachypnea  Dilaudid 0.5 mg IV q2h prn severe pain and Dilaudid 0.2 mg IV q2h prn moderate pain  Ativan 0.2 mg IV q2h prn agitation   Glycopyrrolate 0.4 mg IV q6h prn copious oral secretions   Dulcolax suppository daily prn constipation   Acetaminophen suppository 650 mg q6h prn fever  Zofran 4 mg IV q8h prn nausea or vomiting    Recommend discontinuation of all medications and interventions that do not serve goal of promoting patient's comfort and dignity at end-of-life.    Please page for any acute symptoms or further questions or concerns.

## 2024-12-05 NOTE — H&P ADULT - PROBLEM SELECTOR PLAN 2
Transfer from [facility] to [place] for concern for subdural hematoma with past evacuation but repeat CTT with NO interval bleeding.    Patient on IV Rocephin and will continue for now for presumed cystitis.   Follow urine culture.   Suspect initial delirium is related to polypharmacy--will temporarily HOLD the Xanax, Namenda, Seroquel and  Trazodone--will defer to the Primary Provider to review individual resumption of Rx due to BEERS risk.   Will cautiously continue the valproic acid for behavior modulation as tolerated.    Will assume aspiration risk for now.   S/S evaluation.   FS to monitor for hypoglycemia.

## 2024-12-05 NOTE — PHARMACOTHERAPY INTERVENTION NOTE - COMMENTS
Performed medication reconciliation and home medication list updated in prescription writer/ outpatient medication review. Medications verified with [employer] [facility] paperwork.     Home medications:  ALPRAZolam 0.25 mg oral tablet: 1 tab(s) orally 2 times a day as needed for  anxiety  Cerovite Senior oral tablet: 1 tab(s) orally once a day  docusate sodium 100 mg oral capsule: 1 cap(s) orally once a day  doxazosin 2 mg oral tablet: 1 tab(s) orally once a day  Floranex oral tablet: 1 tab(s) orally once a day  memantine 5 mg oral tablet: 1 tab(s) orally once a day  metoprolol succinate 25 mg oral tablet, extended release: 1 tab(s) orally once a day  PreserVision AREDS oral capsule: 1 cap(s) orally once a day  Seroquel 25 mg oral tablet: 0.5 tab(s) orally once a day  simvastatin 20 mg oral tablet: 1 tab(s) orally once a day  traZODone 50 mg oral tablet: 1 tab(s) orally once a day (at bedtime)  valproic acid 250 mg oral capsule: 1 cap(s) orally 3 times a day    Recommendations: Xanax, Namenda, Seroquel and Trazodone temporarily on hold - team will defer to the Primary Provider to review individual resumption of Rx due to BEERS risk.     Jacklyn Kelley, PharmD  PGY-1 Pharmacy Resident  Available on Teams

## 2024-12-05 NOTE — H&P ADULT - NSICDXPASTMEDICALHX_GEN_ALL_CORE_FT
PAST MEDICAL HISTORY:  Atherosclerosis     CAD (coronary artery disease)     Dementia     HLD (hyperlipidemia)     HTN (hypertension)     PVD (peripheral vascular disease)

## 2024-12-05 NOTE — CONSULT NOTE ADULT - ASSESSMENT
89 y/o M--patient with moderate to severe cognitive impairment, history of essential HTN, with prior hospitalization in Aug 2024 for UTI/pyelo with apparent LEFT hydroureteronephrosis and S/P emergent cystoscopy and LEFT ureteral stent placement with patient S/P LEFT craniotomy and subdural haematoma evacuation 11/2/24 at [place] following initial confusional state, with course complicated with delirium and pulmonary oedema.   Echo at 11/3/24 with LV systolic function with mild to moderate decreased with EF of 40% with regional wall abnormalities present, with patient at the time maintained on valproic acid for mood stabilization (not for seizures) with Seroquel stopped at the time and reduction of Trazodone to 50 mg from 100 mg, with patient on Namenda.  Patient was seen by Palliative Care at [place] in Aug 2024 wit patient DNR/ Do Not Intubate per note but MOLST obtained from [facility] from [place] unable to ascertain selected option on form signed by the patient's daughter, Mathew Brennan.  I left a general message with the daughter by voice mail with my name and office number only.  Unable to obtain history from patient.    Patient is oriented to self/hospital (?) with patient receiving haloperidol earlier in the ER.  Patient presently calm and offers no complaint.    Patient with report of increased confusion at [facility] at [place]--was transferred following course at [facility] following [place] discharge.

## 2024-12-05 NOTE — H&P ADULT - PROBLEM SELECTOR PLAN 3
Patient with prior DNR/ Do Not Intubate orders per prior notes but MOLST from [facility] in the chart and is not clear on review option that was completed when signed by the patient's surrogate.  I left a general message with the daughter with my name and service number only.       Palliative Care consult requested.

## 2024-12-05 NOTE — CONSULT NOTE ADULT - SUBJECTIVE AND OBJECTIVE BOX
Wound Surgery Consult Note:    HPI:  89 y/o Male patient with moderate to severe cognitive impairment, history of essential HTN, with prior hospitalization in Aug 2024 for UTI/pyelo with apparent LEFT hydroureteronephrosis and S/P emergent cystoscopy and LEFT ureteral stent placement with patient S/P LEFT craniotomy and subdural haematoma evacuation 11/2/24 at [place] following initial confusional state, with course complicated with delirium and pulmonary oedema.   Echo at 11/3/24 with LV systolic function with mild to moderate decreased with EF of 40% with regional wall abnormalities present, with patient at the time maintained on valproic acid for mood stabilization (not for seizures) with Seroquel stopped at the time and reduction of Trazodone to 50 mg from 100 mg, with patient on Namenda.  Patient was seen by Palliative Care at [place] in Aug 2024 wit patient DNR/ Do Not Intubate per note but MOLST obtained from [facility] from [place] unable to ascertain selected option on form signed by the patient's daughter, Mathew Brennan.  I left a general message with the daughter by voice mail with my name and office number only.  Unable to obtain history from patient.    Patient is oriented to self/hospital (?) with patient receiving haloperidol earlier in the ER.  Patient presently calm and offers no complaint.    Patient with report of increased confusion at [facility] at [place]--was transferred [facility] following [place] discharge. (05 Dec 2024 02:11)    Request for wound care evaluation of the sacrum and bilateral buttocks received from nursing. Mr. Gallardo was encountered on an alternating air with low air loss surface. He is currently confused and is grossly incontinent of urine and stool. His current incontinence, immobility and inactivity contribute to his increased risk of pressure injury development and may hinder healing. GIven Mr. Oliveira current inactive, immobile and poor nutritional status, the dark hue of the sacral/buttock skin can not be ruled out as a deep tissue injury can not be ruled out.     PAST MEDICAL & SURGICAL HISTORY:  HLD (hyperlipidemia)  HTN (hypertension)  PVD (peripheral vascular disease)  CAD (coronary artery disease)  Atherosclerosis  Dementia  S/P subdural hematoma evacuation    REVIEW OF SYSTEMS  Unable to obtain due to patient's mental status    MEDICATIONS  (STANDING):  cefTRIAXone   IVPB 1000 milliGRAM(s) IV Intermittent every 24 hours  dextrose 5%. 1000 milliLiter(s) (100 mL/Hr) IV Continuous <Continuous>  dextrose 5%. 1000 milliLiter(s) (50 mL/Hr) IV Continuous <Continuous>  dextrose 50% Injectable 25 Gram(s) IV Push once  dextrose 50% Injectable 12.5 Gram(s) IV Push once  dextrose 50% Injectable 25 Gram(s) IV Push once  doxazosin 2 milliGRAM(s) Oral at bedtime  glucagon  Injectable 1 milliGRAM(s) IntraMuscular once  lactobacillus acidophilus 1 Tablet(s) Oral daily  metoprolol succinate ER 25 milliGRAM(s) Oral daily  valproic acid 250 milliGRAM(s) Oral every 8 hours    MEDICATIONS  (PRN):  dextrose Oral Gel 15 Gram(s) Oral once PRN Blood Glucose LESS THAN 70 milliGRAM(s)/deciliter    Allergies    No Known Allergies    Intolerances    SOCIAL HISTORY:  Unable to obtain due to patient's mental status    FAMILY HISTORY:  Family history of premature CAD (Father)    Vital Signs Last 24 Hrs  T(C): 36.6 (05 Dec 2024 04:41), Max: 37 (04 Dec 2024 13:54)  T(F): 97.8 (05 Dec 2024 04:41), Max: 98.6 (04 Dec 2024 13:54)  HR: 90 (05 Dec 2024 04:41) (72 - 90)  BP: 150/76 (05 Dec 2024 04:41) (132/71 - 169/96)  BP(mean): 97 (05 Dec 2024 00:20) (97 - 97)  RR: 17 (05 Dec 2024 04:41) (16 - 18)  SpO2: 96% (05 Dec 2024 04:41) (94% - 100%)    Parameters below as of 05 Dec 2024 04:41  Patient On (Oxygen Delivery Method): room air    Physical Exam:  General: awake, confused  Ophthamology: sclera clear  ENMT: moist mucous membranes, trachea midline  Respiratory: equal chest rise with respirations  Gastrointestinal: soft NT/ND  Neurology: verbal,  following commands  Psych: calm, cooperative  Musculoskeletal: no contractures  Vascular: BLE edema equal  Skin:  Sacrum/bilateral buttocks deep maroon discolored intact skin L 5cm x 5cm x none, no drainage   No odor, increased warmth, tenderness, induration, fluctuance, erythema    LABS:  12-05    143  |  109[H]  |  41[H]  ----------------------------<  72  4.4   |  18[L]  |  0.85    Ca    8.7      05 Dec 2024 07:05    TPro  7.0  /  Alb  3.1[L]  /  TBili  0.5  /  DBili  x   /  AST  37  /  ALT  33  /  AlkPhos  86  12-04                          10.1   9.72  )-----------( 146      ( 05 Dec 2024 07:07 )             31.6     PT/INR - ( 04 Dec 2024 13:40 )   PT: 12.5 sec;   INR: 1.06 ratio         PTT - ( 04 Dec 2024 13:40 )  PTT:29.9 sec  Urinalysis Basic - ( 05 Dec 2024 07:05 )    Color: x / Appearance: x / SG: x / pH: x  Gluc: 72 mg/dL / Ketone: x  / Bili: x / Urobili: x   Blood: x / Protein: x / Nitrite: x   Leuk Esterase: x / RBC: x / WBC x   Sq Epi: x / Non Sq Epi: x / Bacteria: x

## 2024-12-05 NOTE — H&P ADULT - HISTORY OF PRESENT ILLNESS
NIGHT HOSPITALIST:    Patient UNKNOWN to me previously, assigned to me at this point via the ER and by Dr. Bowden to admit this 89 y/o M--patient with moderate to severe cognitive impairment, history of essential HTN, with prior hospitalization in Aug 2024 for UTI/pyelo with apparent LEFT hydroureteronephrosis and S/P emergent cystoscopy and LEFT ureteral stent placement with patient S/P LEFT craniotomy and subdural haematoma evacuation 11/2/24 at [place] following initial confusional state, with course complicated with delirium and pulmonary oedema.   Echo at 11/3/24 with LV systolic function with mild to moderate decreased with EF of 40% with regional wall abnormalities present, with patient at the time maintained on NIGHT HOSPITALIST:    Patient UNKNOWN to me previously, assigned to me at this point via the ER and by Dr. Bowden to admit this 89 y/o M--patient with moderate to severe cognitive impairment, history of essential HTN, with prior hospitalization in Aug 2024 for UTI/pyelo with apparent LEFT hydroureteronephrosis and S/P emergent cystoscopy and LEFT ureteral stent placement with patient S/P LEFT craniotomy and subdural haematoma evacuation 11/2/24 at [place] following initial confusional state, with course complicated with delirium and pulmonary oedema.   Echo at 11/3/24 with LV systolic function with mild to moderate decreased with EF of 40% with regional wall abnormalities present, with patient at the time maintained on valproic acid for mood stabilization (not for seizures) with Seroquel stopped at the time and reduction of Trazodone to 50 mg from 100 mg, with patient on Namenda.  Patient was seen by Palliative Care at [place] in Aug 2024 wit patient DNR/ Do Not Intubate per note but MOLST obtained from [facility] from [place] unable to ascertain selected option on form signed by the patient's daughter, Mathew Brennan.  I left a general message with the daughter by voice mail with my name and office number only.  Unable to obtain history from patient.    Patient is oriented to self/hospital (?) with patient receiving haloperidol earlier in the ER.  Patient presently calm and offers no complaint.    Patient with report of increased confusion at [facility] at [place]--was transferred following [facility] following [place] discharge.

## 2024-12-05 NOTE — CONSULT NOTE ADULT - ASSESSMENT
Transfer from [facility] to [place] for concern for subdural hematoma with past evacuation but repeat CTT with NO interval bleeding.    Patient on IV Rocephin and will continue for now for presumed cystitis.   Follow urine culture.   Suspect initial delirium is related to polypharmacy--will temporarily HOLD the Xanax, Namenda, Seroquel and  Trazodone--will defer to the Primary Provider to review individual resumption of Rx due to BEERS risk.   Will cautiously continue the valproic acid for behavior modulation as tolerated.    Will assume aspiration risk for now.   S/S evaluation. Palliative care called to clarify MOLST order/ACP.

## 2024-12-05 NOTE — CONSULT NOTE ADULT - PROBLEM SELECTOR RECOMMENDATION 9
Multifactorial including but not limited to infection, antibiotics, medication, dementia, hospitalization.
IV abx   follow up cultures

## 2024-12-05 NOTE — CONSULT NOTE ADULT - TIME BILLING
Advanced care planning was discussed with patient and family.  Advanced care planning forms were reviewed and discussed as appropriate.  Differential diagnosis and plan of care discussed with patient after the evaluation.   Pain assessed and judicious use of narcotics when appropriate was discussed.  Importance of Fall prevention discussed.  Counseling on Smoking and Alcohol cessation was offered when appropriate.  Counseling on Diet, exercise, and medication compliance was done.
symptom assessment and management, supportive counseling, coordination of care, discussion and coordination with team.      Jennifer Calderon, ANP-BC  Please contact me via Teams  between 6am-2pm. If not answering, please call the palliative care pager [phone]    After 2pm and on weekends, please see the contact information below:    In the event of newly developing, evolving, or worsening symptoms between 2pm and 5pm please contact the Palliative Medicine via extension [phone] for assistance.  After 5pm please contact team via pager (if the patient is at [facility] #[phone] or if the patient is at [facility] #[phone]) The Geriatric and Palliative Medicine service has coverage 24 hours a day/ 7 days a week to provide medical recommendations regarding symptom management needs via telephone

## 2024-12-05 NOTE — H&P ADULT - NSHPADDITIONALINFOADULT_GEN_ALL_CORE
NIGHT HOSPITALIST:    Daughter aware of course present with ED physician earlier in the ER.  I left a general message as above with the daughter.   Given patient's comorbidities, patient's long term prognosis is guarded.   Palliative Care consult requested for clarification of patient's MOLST.   Care reviewed with covering NP/FANTA Hector for endorsement to Dr. Bowden.    Brent Self MD  Available on [employer] Teams.

## 2024-12-05 NOTE — H&P ADULT - GASTROINTESTINAL
Problem: Non-pressure Ulcer Wound  Intervention: # Assess and measure wound every 7 days and if status is deteriorating.  Enter today’s date indicating that the wound was measured. This will produce a worklist task that will fire 7 days from the date entered to repeat the measurement.   Educated pt and caregiver that acetic acid soak is to be applied 10 minutes prior to dressing changes.         
nontender/normal active bowel sounds/no guarding/no rigidity

## 2024-12-06 DIAGNOSIS — F03.90 UNSPECIFIED DEMENTIA, UNSPECIFIED SEVERITY, WITHOUT BEHAVIORAL DISTURBANCE, PSYCHOTIC DISTURBANCE, MOOD DISTURBANCE, AND ANXIETY: ICD-10-CM

## 2024-12-06 LAB
A1C WITH ESTIMATED AVERAGE GLUCOSE RESULT: 5.1 % — SIGNIFICANT CHANGE UP (ref 4–5.6)
ESTIMATED AVERAGE GLUCOSE: 100 MG/DL — SIGNIFICANT CHANGE UP (ref 68–114)
GLUCOSE BLDC GLUCOMTR-MCNC: 111 MG/DL — HIGH (ref 70–99)
GLUCOSE BLDC GLUCOMTR-MCNC: 83 MG/DL — SIGNIFICANT CHANGE UP (ref 70–99)
GLUCOSE BLDC GLUCOMTR-MCNC: 84 MG/DL — SIGNIFICANT CHANGE UP (ref 70–99)
GLUCOSE BLDC GLUCOMTR-MCNC: 88 MG/DL — SIGNIFICANT CHANGE UP (ref 70–99)
GLUCOSE BLDC GLUCOMTR-MCNC: 91 MG/DL — SIGNIFICANT CHANGE UP (ref 70–99)
GLUCOSE BLDC GLUCOMTR-MCNC: 95 MG/DL — SIGNIFICANT CHANGE UP (ref 70–99)
GLUCOSE BLDC GLUCOMTR-MCNC: 95 MG/DL — SIGNIFICANT CHANGE UP (ref 70–99)
HCT VFR BLD CALC: 32.9 % — LOW (ref 39–50)
HGB BLD-MCNC: 10.6 G/DL — LOW (ref 13–17)
MCHC RBC-ENTMCNC: 30.3 PG — SIGNIFICANT CHANGE UP (ref 27–34)
MCHC RBC-ENTMCNC: 32.2 G/DL — SIGNIFICANT CHANGE UP (ref 32–36)
MCV RBC AUTO: 94 FL — SIGNIFICANT CHANGE UP (ref 80–100)
NRBC # BLD: 0 /100 WBCS — SIGNIFICANT CHANGE UP (ref 0–0)
PLATELET # BLD AUTO: 168 K/UL — SIGNIFICANT CHANGE UP (ref 150–400)
RBC # BLD: 3.5 M/UL — LOW (ref 4.2–5.8)
RBC # FLD: 15.9 % — HIGH (ref 10.3–14.5)
WBC # BLD: 6.4 K/UL — SIGNIFICANT CHANGE UP (ref 3.8–10.5)
WBC # FLD AUTO: 6.4 K/UL — SIGNIFICANT CHANGE UP (ref 3.8–10.5)

## 2024-12-06 PROCEDURE — 99223 1ST HOSP IP/OBS HIGH 75: CPT | Mod: GC

## 2024-12-06 PROCEDURE — 99221 1ST HOSP IP/OBS SF/LOW 40: CPT | Mod: GC

## 2024-12-06 RX ORDER — 0.9 % SODIUM CHLORIDE 0.9 %
1000 INTRAVENOUS SOLUTION INTRAVENOUS
Refills: 0 | Status: DISCONTINUED | OUTPATIENT
Start: 2024-12-06 | End: 2024-12-10

## 2024-12-06 RX ORDER — TRAZODONE HYDROCHLORIDE 150 MG/1
50 TABLET ORAL AT BEDTIME
Refills: 0 | Status: DISCONTINUED | OUTPATIENT
Start: 2024-12-06 | End: 2024-12-10

## 2024-12-06 RX ORDER — MEMANTINE HYDROCHLORIDE 14 MG/1
5 CAPSULE, EXTENDED RELEASE ORAL DAILY
Refills: 0 | Status: DISCONTINUED | OUTPATIENT
Start: 2024-12-06 | End: 2024-12-10

## 2024-12-06 RX ORDER — ALPRAZOLAM 0.5 MG
0.25 TABLET ORAL
Refills: 0 | Status: DISCONTINUED | OUTPATIENT
Start: 2024-12-06 | End: 2024-12-10

## 2024-12-06 RX ADMIN — VALPROIC ACID 250 MILLIGRAM(S): 250 SOLUTION ORAL at 05:28

## 2024-12-06 RX ADMIN — VALPROIC ACID 250 MILLIGRAM(S): 250 SOLUTION ORAL at 14:06

## 2024-12-06 RX ADMIN — Medication 100 MILLIGRAM(S): at 02:19

## 2024-12-06 RX ADMIN — Medication 10 MILLIGRAM(S): at 21:42

## 2024-12-06 RX ADMIN — Medication 1 TABLET(S): at 14:06

## 2024-12-06 RX ADMIN — TRAZODONE HYDROCHLORIDE 50 MILLIGRAM(S): 150 TABLET ORAL at 21:42

## 2024-12-06 RX ADMIN — VALPROIC ACID 250 MILLIGRAM(S): 250 SOLUTION ORAL at 21:42

## 2024-12-06 RX ADMIN — METOPROLOL TARTRATE 25 MILLIGRAM(S): 100 TABLET, FILM COATED ORAL at 05:28

## 2024-12-06 RX ADMIN — Medication 50 MILLILITER(S): at 01:17

## 2024-12-06 RX ADMIN — Medication 2 MILLIGRAM(S): at 21:42

## 2024-12-06 NOTE — CONSULT NOTE ADULT - SUBJECTIVE AND OBJECTIVE BOX
Neurology - Consult Note  -  Spectra: [phone] ([facility]), [phone] ([facility])  -    HPI: As per chart review, patient TERRI CARRIZALES is a 89y (24-Jan-1926) man with a PMHx significant for moderate to severe cognitive impairment, history of essential HTN, with prior hospitalization in Aug 2024 for UTI/pyelo with apparent LEFT hydroureteronephrosis and S/P emergent cystoscopy and LEFT ureteral stent placement with patient S/P LEFT craniotomy and subdural haematoma evacuation 11/2/24 at [facility] following initial confusional state, with course complicated with delirium and pulmonary oedema. Patient at the time maintained on valproic acid for mood stabilization (not for seizures) with Seroquel stopped at the time and reduction of Trazodone to 50 mg from 100 mg, with patient on Memantine.          Allergies:  No Known Allergies      PMHx/PSHx/Family Hx: As above, otherwise see below   HLD (hyperlipidemia)    HTN (hypertension)    PVD (peripheral vascular disease)    CAD (coronary artery disease)    Atherosclerosis    Dementia        Social Hx:  No current use of tobacco, alcohol, or illicit drugs  Lives with ***    Medications:  MEDICATIONS  (STANDING):  cefTRIAXone   IVPB 1000 milliGRAM(s) IV Intermittent every 24 hours  dextrose 5%. 1000 milliLiter(s) (100 mL/Hr) IV Continuous <Continuous>  dextrose 5%. 1000 milliLiter(s) (50 mL/Hr) IV Continuous <Continuous>  dextrose 5%. 1000 milliLiter(s) (50 mL/Hr) IV Continuous <Continuous>  dextrose 50% Injectable 25 Gram(s) IV Push once  dextrose 50% Injectable 12.5 Gram(s) IV Push once  dextrose 50% Injectable 25 Gram(s) IV Push once  doxazosin 2 milliGRAM(s) Oral at bedtime  glucagon  Injectable 1 milliGRAM(s) IntraMuscular once  lactobacillus acidophilus 1 Tablet(s) Oral daily  metoprolol succinate ER 25 milliGRAM(s) Oral daily  valproic acid 250 milliGRAM(s) Oral every 8 hours    MEDICATIONS  (PRN):  dextrose Oral Gel 15 Gram(s) Oral once PRN Blood Glucose LESS THAN 70 milliGRAM(s)/deciliter      Vitals:  T(C): 36.3 (12-06-24 @ 12:00), Max: 36.6 (12-06-24 @ 00:16)  HR: 81 (12-06-24 @ 12:00) (79 - 95)  BP: 141/73 (12-06-24 @ 12:00) (124/82 - 166/86)  RR: 18 (12-06-24 @ 12:00) (18 - 18)  SpO2: 98% (12-06-24 @ 12:00) (94% - 98%)    Physical Examination: INCOMPLETE  General - NAD, pleasant, cooperative   Cardiovascular - Peripheral pulses palpable, no edema  Neurologic Exam:  Mental status - Awake, Alert, Oriented to person, place, and time. Speech fluent, repetition and naming intact. Follows simple and complex commands. Attention/concentration, recent and remote memory (including registration and recall), and fund of knowledge intact    Cranial nerves:  CN II: Visual fields are full to confrontation. Fundoscopic exam is normal with sharp discs. Pupils are 4 mm and briskly reactive to light. Visual acuity is 20/20 bilaterally.  CN III, IV, VI: EOMI, no nystagmus, no ptosis  CN V: Facial sensation is intact to pinprick in all 3 divisions bilaterally.  CN VII: Face is symmetric with normal eye closure and smile.  CN VII: Hearing is normal to rubbing fingers  CN IX, X: Palate elevates symmetrically. Phonation is normal.  CN XI: Head turning and shoulder shrug are intact  CN XII: Tongue is midline with normal movements and no atrophy.    Motor - Normal bulk and tone throughout. No pronator drift of out-stretched arms.  Strength testing            Deltoid(C5)  Biceps(C6)    Triceps(C7)     Wrist Extension    Wrist Flexion (C8)     Interossei  (T1)       R            5                 5                        5                     5                              5                                      5                         5  L             5                 5                        5                     5                              5                                      5                          5              Hip Flexion(L2/3)    Hip Extension (L4/5)   Knee Flexion (L4/5/S1)    Knee Extension (L3/4)   Dorsiflexion (L4/5)   Plantar Flexion (S1)  R              5                                    5                                     5                                                     5                                              5                          5  L              5                                     5                                     5                                                     5                                              5                          5    Sensation - Light touch/temperature OR pain/vibration intact in fingers and toes     DTR's -             Biceps      Triceps     Brachioradialis      Patellar    Ankle    Toes/plantar response  R             2+             2+                  2+                       2+            2+                 Down  L              2+             2+                 2+                        2+           2+                 Down    Coordination - Rapid alternating movements and fine finger movements are intact. There is no dysmetria on finger-to-nose and heel-knee-shin. There are no abnormal or extraneous movements. Romberg?    Gait and station - Posture is normal. Gait is steady with normal steps, base, arm swing, and turning. Heel and toe walking are normal. Tandem gait is normal       Labs:                        10.6   6.40  )-----------( 168      ( 06 Dec 2024 07:08 )             32.9     12-05    143  |  109[H]  |  41[H]  ----------------------------<  72  4.4   |  18[L]  |  0.85    Ca    8.7      05 Dec 2024 07:05    TPro  7.0  /  Alb  3.1[L]  /  TBili  0.5  /  DBili  x   /  AST  37  /  ALT  33  /  AlkPhos  86  12-04    CAPILLARY BLOOD GLUCOSE      POCT Blood Glucose.: 83 mg/dL (06 Dec 2024 11:57)    LIVER FUNCTIONS - ( 04 Dec 2024 13:40 )  Alb: 3.1 g/dL / Pro: 7.0 g/dL / ALK PHOS: 86 U/L / ALT: 33 U/L / AST: 37 U/L / GGT: x             Culture - Urine (collected 04 Dec 2024 13:40)  Source: Clean Catch Clean Catch (Midstream)  Preliminary Report (06 Dec 2024 06:39):    >100,000 CFU/ml Gram Negative Rods      PT/INR - ( 04 Dec 2024 13:40 )   PT: 12.5 sec;   INR: 1.06 ratio         PTT - ( 04 Dec 2024 13:40 )  PTT:29.9 sec  CSF:                  Radiology:  CT Head No Cont:  (04 Dec 2024 20:13)  CT Head No Cont:  (04 Dec 2024 15:07)     Neurology - Consult Note  -  Spectra: [phone] ([facility]), [phone] ([facility])  -    HPI: As per chart review, patient TERRI CARRIZALES is a 89y (24-Jan-1926) man with a PMHx significant for moderate to severe cognitive impairment, history of essential HTN, with prior hospitalization in Aug 2024 for UTI/pyelo with apparent LEFT hydroureteronephrosis and S/P emergent cystoscopy and LEFT ureteral stent placement with patient S/P LEFT craniotomy and subdural haematoma evacuation 11/2/24 at [facility] following initial confusional state, with course complicated with delirium and pulmonary oedema. Patient at the time maintained on valproic acid for mood stabilization (not for seizures) with Seroquel stopped at the time and reduction of Trazodone to 50 mg from 100 mg, with patient on Memantine. Patient admitted for worsening mental status, noted to have a stable CTH but UTI positive with Ucx positive.    Evaluated at bedside, limited participation in exam and interview. Patient with poor participation, noted to be fluctuant but redirectable.  Reports that he would like to be left alone during examination.      Allergies:  No Known Allergies      PMHx/PSHx/Family Hx: As above, otherwise see below   HLD (hyperlipidemia)    HTN (hypertension)    PVD (peripheral vascular disease)    CAD (coronary artery disease)    Atherosclerosis    Dementia        Social Hx:  No current use of tobacco, alcohol, or illicit drugs  Lives with ***    Medications:  MEDICATIONS  (STANDING):  cefTRIAXone   IVPB 1000 milliGRAM(s) IV Intermittent every 24 hours  dextrose 5%. 1000 milliLiter(s) (100 mL/Hr) IV Continuous <Continuous>  dextrose 5%. 1000 milliLiter(s) (50 mL/Hr) IV Continuous <Continuous>  dextrose 5%. 1000 milliLiter(s) (50 mL/Hr) IV Continuous <Continuous>  dextrose 50% Injectable 25 Gram(s) IV Push once  dextrose 50% Injectable 12.5 Gram(s) IV Push once  dextrose 50% Injectable 25 Gram(s) IV Push once  doxazosin 2 milliGRAM(s) Oral at bedtime  glucagon  Injectable 1 milliGRAM(s) IntraMuscular once  lactobacillus acidophilus 1 Tablet(s) Oral daily  metoprolol succinate ER 25 milliGRAM(s) Oral daily  valproic acid 250 milliGRAM(s) Oral every 8 hours    MEDICATIONS  (PRN):  dextrose Oral Gel 15 Gram(s) Oral once PRN Blood Glucose LESS THAN 70 milliGRAM(s)/deciliter      Vitals:  T(C): 36.3 (12-06-24 @ 12:00), Max: 36.6 (12-06-24 @ 00:16)  HR: 81 (12-06-24 @ 12:00) (79 - 95)  BP: 141/73 (12-06-24 @ 12:00) (124/82 - 166/86)  RR: 18 (12-06-24 @ 12:00) (18 - 18)  SpO2: 98% (12-06-24 @ 12:00) (94% - 98%)    Physical Examination:  General - Limited participation  Neurologic Exam:  Mental status - Awake, Alert, Oriented to person says age is 54, doesn;t answer orientation questions about place and date. Speech fluent, although tangential. Inconsistent repetition and doesn't participate in naming intact. Follows simple commands.    Cranial nerves:  CN II: Doesn't participate in pupil exam  CN III, IV, VI: EOMI, L. eye ptosis  CN V: Facial sensation is intact to touch in all 3 divisions bilaterally.  CN VII: Face is symmetric with normal eye closure and smile.  CN IX, X XI, XII: Couldn't be tested due to exisitng patient factors    Motor - Reduced muscle bulk symmetrically  Spontaneously moving arms and limbs antigravity    strength symmetric    Sensation - Light touch intact in to touch in UE and LE    DTR's -Declined exam                Coordination , Gait     Labs:                        10.6   6.40  )-----------( 168      ( 06 Dec 2024 07:08 )             32.9     12-05    143  |  109[H]  |  41[H]  ----------------------------<  72  4.4   |  18[L]  |  0.85    Ca    8.7      05 Dec 2024 07:05    TPro  7.0  /  Alb  3.1[L]  /  TBili  0.5  /  DBili  x   /  AST  37  /  ALT  33  /  AlkPhos  86  12-04    CAPILLARY BLOOD GLUCOSE      POCT Blood Glucose.: 83 mg/dL (06 Dec 2024 11:57)    LIVER FUNCTIONS - ( 04 Dec 2024 13:40 )  Alb: 3.1 g/dL / Pro: 7.0 g/dL / ALK PHOS: 86 U/L / ALT: 33 U/L / AST: 37 U/L / GGT: x             Culture - Urine (collected 04 Dec 2024 13:40)  Source: Clean Catch Clean Catch (Midstream)  Preliminary Report (06 Dec 2024 06:39):    >100,000 CFU/ml Gram Negative Rods      PT/INR - ( 04 Dec 2024 13:40 )   PT: 12.5 sec;   INR: 1.06 ratio         PTT - ( 04 Dec 2024 13:40 )  PTT:29.9 sec  CSF:                  Radiology:  CT Head No Cont:  (04 Dec 2024 20:13)  CT Head No Cont:  (04 Dec 2024 15:07)     Neurology - Consult Note  -  Spectra: [phone] ([facility]), [phone] ([facility])  -    HPI: As per chart review, patient TERRI CARRIZALES is a 89y (24-Jan-1926) man with a PMHx significant for moderate to severe cognitive impairment, history of essential HTN, with prior hospitalization in Aug 2024 for UTI/pyelo with apparent LEFT hydroureteronephrosis and S/P emergent cystoscopy and LEFT ureteral stent placement with patient S/P LEFT craniotomy and subdural haematoma evacuation 11/2/24 at [facility] following initial confusional state, with course complicated with delirium and pulmonary oedema. Patient at the time maintained on valproic acid for mood stabilization (not for seizures) with Seroquel stopped at the time and reduction of Trazodone to 50 mg from 100 mg, with patient on Memantine. Patient admitted for worsening mental status, noted to have a stable SDH on CTH but UTI positive with Ucx positive.     Patient noted to be in group session at the time of evaluation. At interview, patient conversational but with poor participation, noted to be fluctuant but re-directable.  Reports that he would like to be left alone during examination.      Allergies:  No Known Allergies      PMHx/PSHx/Family Hx: As above, otherwise see below   HLD (hyperlipidemia)    HTN (hypertension)    PVD (peripheral vascular disease)    CAD (coronary artery disease)    Atherosclerosis    Dementia        Social Hx:  No current use of tobacco, alcohol, or illicit drugs  Lives with ***    Medications:  MEDICATIONS  (STANDING):  cefTRIAXone   IVPB 1000 milliGRAM(s) IV Intermittent every 24 hours  dextrose 5%. 1000 milliLiter(s) (100 mL/Hr) IV Continuous <Continuous>  dextrose 5%. 1000 milliLiter(s) (50 mL/Hr) IV Continuous <Continuous>  dextrose 5%. 1000 milliLiter(s) (50 mL/Hr) IV Continuous <Continuous>  dextrose 50% Injectable 25 Gram(s) IV Push once  dextrose 50% Injectable 12.5 Gram(s) IV Push once  dextrose 50% Injectable 25 Gram(s) IV Push once  doxazosin 2 milliGRAM(s) Oral at bedtime  glucagon  Injectable 1 milliGRAM(s) IntraMuscular once  lactobacillus acidophilus 1 Tablet(s) Oral daily  metoprolol succinate ER 25 milliGRAM(s) Oral daily  valproic acid 250 milliGRAM(s) Oral every 8 hours    MEDICATIONS  (PRN):  dextrose Oral Gel 15 Gram(s) Oral once PRN Blood Glucose LESS THAN 70 milliGRAM(s)/deciliter      Vitals:  T(C): 36.3 (12-06-24 @ 12:00), Max: 36.6 (12-06-24 @ 00:16)  HR: 81 (12-06-24 @ 12:00) (79 - 95)  BP: 141/73 (12-06-24 @ 12:00) (124/82 - 166/86)  RR: 18 (12-06-24 @ 12:00) (18 - 18)  SpO2: 98% (12-06-24 @ 12:00) (94% - 98%)    Physical Examination:  General - Limited participation  Neurologic Exam:  Mental status - Awake, Alert, Oriented to person says age is 54, doesn't answer orientation questions about place and date. Speech fluent, although tangential. Inconsistent repetition and doesn't participate in naming intact. Follows simple commands.    Cranial nerves:  CN II: Doesn't participate in pupil exam  CN III, IV, VI: EOMI, L. eye ptosis  CN V: Facial sensation is intact to touch in all 3 divisions bilaterally.  CN VII: Face is symmetric with normal eye closure and smile.  CN IX, X XI, XII: Couldn't be tested due to existing patient factors    Motor - Reduced muscle bulk symmetrically  Spontaneously moving arms and limbs antigravity    strength symmetric    Sensation - Light touch intact in to touch in UE and LE    DTR's -Declined exam                Coordination - Couldn't be tested due to existing patient factors     Gait     Labs:                        10.6   6.40  )-----------( 168      ( 06 Dec 2024 07:08 )             32.9     12-05    143  |  109[H]  |  41[H]  ----------------------------<  72  4.4   |  18[L]  |  0.85    Ca    8.7      05 Dec 2024 07:05    TPro  7.0  /  Alb  3.1[L]  /  TBili  0.5  /  DBili  x   /  AST  37  /  ALT  33  /  AlkPhos  86  12-04    CAPILLARY BLOOD GLUCOSE      POCT Blood Glucose.: 83 mg/dL (06 Dec 2024 11:57)    LIVER FUNCTIONS - ( 04 Dec 2024 13:40 )  Alb: 3.1 g/dL / Pro: 7.0 g/dL / ALK PHOS: 86 U/L / ALT: 33 U/L / AST: 37 U/L / GGT: x             Culture - Urine (collected 04 Dec 2024 13:40)  Source: Clean Catch Clean Catch (Midstream)  Preliminary Report (06 Dec 2024 06:39):    >100,000 CFU/ml Gram Negative Rods      PT/INR - ( 04 Dec 2024 13:40 )   PT: 12.5 sec;   INR: 1.06 ratio         PTT - ( 04 Dec 2024 13:40 )  PTT:29.9 sec  CSF:                  Radiology:  CT Head No Cont:  (04 Dec 2024 20:13)  CT Head No Cont:  (04 Dec 2024 15:07)

## 2024-12-06 NOTE — BH CONSULTATION LIAISON ASSESSMENT NOTE - RISK ASSESSMENT
Risk factors: dementia    Protective factors: no known h/o psych admissions, domiciled, social supports

## 2024-12-06 NOTE — BH CONSULTATION LIAISON ASSESSMENT NOTE - NSBHCHARTREVIEWLAB_PSY_A_CORE FT
10.6   6.40  )-----------( 168      ( 06 Dec 2024 07:08 )             32.9     12-05    143  |  109[H]  |  41[H]  ----------------------------<  72  4.4   |  18[L]  |  0.85    Ca    8.7      05 Dec 2024 07:05      Urinalysis Basic - ( 05 Dec 2024 07:05 )    Color: x / Appearance: x / SG: x / pH: x  Gluc: 72 mg/dL / Ketone: x  / Bili: x / Urobili: x   Blood: x / Protein: x / Nitrite: x   Leuk Esterase: x / RBC: x / WBC x   Sq Epi: x / Non Sq Epi: x / Bacteria: x

## 2024-12-06 NOTE — BH CONSULTATION LIAISON ASSESSMENT NOTE - NSBHCHARTREVIEWVS_PSY_A_CORE FT
Vital Signs Last 24 Hrs  T(C): 36.3 (06 Dec 2024 12:00), Max: 36.6 (06 Dec 2024 00:16)  T(F): 97.4 (06 Dec 2024 12:00), Max: 97.8 (06 Dec 2024 00:16)  HR: 81 (06 Dec 2024 12:00) (79 - 95)  BP: 141/73 (06 Dec 2024 12:00) (124/82 - 166/86)  BP(mean): --  RR: 18 (06 Dec 2024 12:00) (18 - 18)  SpO2: 98% (06 Dec 2024 12:00) (94% - 98%)    Parameters below as of 06 Dec 2024 12:00  Patient On (Oxygen Delivery Method): room air

## 2024-12-06 NOTE — SWALLOW BEDSIDE ASSESSMENT ADULT - SWALLOW EVAL: RECOMMENDED DIET
Pt appears at increased risk of aspiration with oral feeding at this time. However, given Pt's overall condition, advanced age, and documentation of preference for focus on comfort and dignity, would suggest further discussion re GOC for nutrition/hydration.

## 2024-12-06 NOTE — DIETITIAN INITIAL EVALUATION ADULT - REASON INDICATOR FOR ASSESSMENT
nutrition consults received for nutrition support team "hx ICH, dementia, poor PO" and MST 2 or > (unsure wt loss)  BMI < 19

## 2024-12-06 NOTE — SWALLOW BEDSIDE ASSESSMENT ADULT - SLP GENERAL OBSERVATIONS
Pt seen OOB in franky-chair. Pt awake and alert, confused, oriented to self only, inconsistently following simple directions for the purposes of the exam.

## 2024-12-06 NOTE — DIETITIAN INITIAL EVALUATION ADULT - PROBLEM SELECTOR PLAN 2
Transfer from [facility] to [place] for concern for subdural hematoma with past evacuation but repeat CTT with NO interval bleeding.    Patient on IV Rocephin and will continue for now for presumed cystitis.   Follow urine culture.   Suspect initial delirium is related to polypharmacy--will temporarily HOLD the Xanax, Namenda, Seroquel and  Trazodone--will defer to the Primary Provider to review individual resumption of Rx due to BEERS risk.   Will cautiously continue the valproic acid for behavior modulation as tolerated.    Will assume aspiration risk for now.   S/S evaluation.   FS to monitor for hypoglycemia.

## 2024-12-06 NOTE — CONSULT NOTE ADULT - ATTENDING COMMENTS
Please note, during my evaluation, patient found confuse and did not cooperate for detailed exam.   I discussed the case with the Resident and agree with the findings and plan as documented in the Resident's note except below.  Etiology of altered mental status is uncertain but most likely the metabolic encephalopathy clinically less suspicious for vascular versus seizure.  Since history and exam is very limited, therefore, patient will benefit from further work up to rule out treatable etiologies MRI brain & VEEG.  Continue medical management, neuro- check and fall precaution.  GI and DVT prophylaxis.  Patient is unable or incompetent to participate in giving a history and/or making decisions and discussion is necessary for determining treatment decisions.  My cumulative time taking care of this patient is 85 minutes  If you have any further questions, please do not hesitate to contact our team.  Thank you for allowing us to participate in this patient care.

## 2024-12-06 NOTE — BH CONSULTATION LIAISON ASSESSMENT NOTE - SUMMARY
89 year old male domiciled in nursing home, with pmh dementia (baseline AOx2 per EMR), HTN, PVD admitted for UTI, psych consulted for AMS. Staff reports that pt has been agitated. On interview pt is AOX1, only to person. Pt is calm, although he keeps removing clothes. Pt is unable to answer further questions. Presentation is likely attributable to delirium from UTI. Pt does not meet criteria for psychiatric admission. 89 year old male domiciled in nursing home, with pmh dementia (baseline AOx2 per EMR), HTN, PVD admitted for UTI, psych consulted for AMS. Staff reports that pt has been agitated. On interview pt is AOX1, only to person. Pt is calm, although he keeps removing clothes. Pt is unable to answer further questions. Presentation is likely attributable to delirium from UTI superimposed on dementia. Pt does not meet criteria for psychiatric admission at this time.

## 2024-12-06 NOTE — DIETITIAN INITIAL EVALUATION ADULT - NSFNSPHYEXAMSKINFT_GEN_A_CORE
WOCN 12/5: Bilateral sacrum and buttocks, Suspected deep tissue injury  left heel St 1 per RN flowsheets, not identified in WOCN note

## 2024-12-06 NOTE — DIETITIAN INITIAL EVALUATION ADULT - NSFNSGIIOFT_GEN_A_CORE
Pt denies any N/V/D/C. Last BM 12/6 per RN flowsheets. Pt ordered for lactobacillus while on IV Abx.

## 2024-12-06 NOTE — PROVIDER CONTACT NOTE (HYPOGLYCEMIA EVENT) - NS PROVIDER CONTACT BACKGROUND-HYPO
Age: 89y    Gender: Male    POCT Blood Glucose:  84 mg/dL (12-06-24 @ 00:48)  89 mg/dL (12-05-24 @ 19:43)  97 mg/dL (12-05-24 @ 12:12)  89 mg/dL (12-05-24 @ 06:49)      eMAR:

## 2024-12-06 NOTE — CONSULT NOTE ADULT - ASSESSMENT
ASSESSMENT   HPI: As per chart review, patient TERRI CARRIZALES is a 89y (24-Jan-1926) man with a PMHx significant for moderate to severe cognitive impairment, history of essential HTN, with prior hospitalization in Aug 2024 for UTI/pyelo with apparent LEFT hydroureteronephrosis and S/P emergent cystoscopy and LEFT ureteral stent placement with patient S/P LEFT craniotomy and subdural haematoma evacuation 11/2/24 at [facility] following initial confusional state, with course complicated with delirium and pulmonary oedema. Patient at the time maintained on valproic acid for mood stabilization (not for seizures) with Seroquel stopped at the time and reduction of Trazodone to 50 mg from 100 mg, with patient on Memantine. Patient admitted for worsening mental status, noted to have a stable CTH but UTI positive with Ucx positive. Evaluated at bedside, limited participation in exam and interview. Patient with poor participation, noted to be fluctuant but redirectable.  Reports that he would like to be left alone during examination. Exam limited but grossly non focal. CT head stable    IMPRESSION   Worsening mentation in setting of dementia and UTI. Likely acute on chronic process. Likely encephalopathic, secondary to infectious etiology.    RECOMMENDATION   - Treatment of UTI as per primary  - Blood work- Vit b12, folic acid, TSh free T4, Vit D 25OH  - Fall precautions  - Recommend to check: TSH, B12, folate, RPR, UA, U-tox  -Avoid benzos. opioids (if possible) and anticholinergic as it may worsen confusion  -Maintain sleep wake cycle  -Provide frequent reorientation and redirection  Family member at bedside if possible        To be discussed with attending ASSESSMENT   HPI: As per chart review, patient TERRI CARRIZALES is a 89y (24-Jan-1926) man with a PMHx significant for moderate to severe cognitive impairment, history of essential HTN, with prior hospitalization in Aug 2024 for UTI/pyelo with apparent LEFT hydroureteronephrosis and S/P emergent cystoscopy and LEFT ureteral stent placement with patient S/P LEFT craniotomy and subdural haematoma evacuation 11/2/24 at [facility] following initial confusional state, with course complicated with delirium and pulmonary oedema. Patient at the time maintained on valproic acid for mood stabilization (not for seizures) with Seroquel stopped at the time and reduction of Trazodone to 50 mg from 100 mg, with patient on Memantine. Patient admitted for worsening mental status, noted to have a stable CTH but UTI positive with Ucx positive. Evaluated at bedside, limited participation in exam and interview. Patient with poor participation, noted to be fluctuant but redirectable.  Reports that he would like to be left alone during examination. Exam limited but grossly non focal. CT head stable, normal WBC and CMP.     IMPRESSION   Fluctuating mentation and behavioral changes likely encephalopathy due to global brain dysfunction, secondary to UTI with in pre-existing advanced dementia. No focal neurological deficits suggesting stroke.     RECOMMENDATION   -Treatment of UTI as per primary  -MRI Brain w/o contrast can be considered if mental status doesn't improve despite treatment of UTI and other metabolic etiology.  -Routine EEG can be considered if mental status doesn't improve despite treatment of UTI and other metabolic etiology.  -Blood work- Vit b12, folic acid, TSH free T4, Vit D 25OH  -Fall precautions  -Avoid benzodiazepines, opioids (if possible) and anticholinergic as it may worsen confusion  -Maintain sleep wake cycle  -Provide frequent reorientation and redirection    Seen by attending, Dr. Bowden and team.

## 2024-12-06 NOTE — DIETITIAN INITIAL EVALUATION ADULT - PERTINENT LABORATORY DATA
12-05    143  |  109[H]  |  41[H]  ----------------------------<  72  4.4   |  18[L]  |  0.85    Ca    8.7      05 Dec 2024 07:05    TPro  7.0  /  Alb  3.1[L]  /  TBili  0.5  /  DBili  x   /  AST  37  /  ALT  33  /  AlkPhos  86  12-04  POCT Blood Glucose.: 111 mg/dL (12-06-24 @ 06:03)  A1C with Estimated Average Glucose Result: 5.1 % (12-06-24 @ 07:08)

## 2024-12-06 NOTE — PROVIDER CONTACT NOTE (HYPOGLYCEMIA EVENT) - NS PROVIDER CONTACT RECOMMEND-HYPO
Notify provider. FANTA Hernandez made aware. D5 fluids at 50 ml/hr ordered. Recheck fs until it gets to above 100 as per FANTA Alvarado's orders.  Fluid/Electrolyte/Metabolic

## 2024-12-06 NOTE — DIETITIAN INITIAL EVALUATION ADULT - ADD RECOMMEND
1) NPO, advance diet per SLP recs free of therapeutic restrictions  2) once diet advanced, recommend Ensure Plus High Protein 2x/day (350 kcal, 20 g Pro/8oz)  3) recommend MVI, vit C, and Rocco 1x/day for wound healing  4) Malnutrition sticker placed, RD to follow-up as per protocol   5) Nutrition GOC discussions  6) Monitor PO intake, weight, labs, skin, GI status, diet

## 2024-12-06 NOTE — SWALLOW BEDSIDE ASSESSMENT ADULT - SWALLOW EVAL: CURRENT DIET
BP elevated on presentation and during hospital stay with SBP in the 200's  Re-started home norvasc, coreg, and losartan  Increase norvasc to 10mg daily   NPO except meds, ice chips/sips of water

## 2024-12-06 NOTE — BH CONSULTATION LIAISON ASSESSMENT NOTE - NSBHCONSULTRECOMMENDOTHER_PSY_A_CORE FT
Minimize use of benzos, opioids, anticholinergics, or other deliriogenic agents when possible. Maintain sleep wake cycle. Provide frequent reorientation and redirection.  Family member at bedside if possible. Assess for need for glasses and hearing aid (if applicable).    Can continue home psych meds: Trazadone, xanax, memantine, Seroquel. Hold for sedation.     Pt does not meet criteria for psychiatric hospitalization. Recommend outpt psych f/u at [facility] after d/c- [phone].   [facility] Psych will follow. Minimize use of benzos, opioids, anticholinergics, or other deliriogenic agents when possible. Maintain sleep wake cycle. Provide frequent reorientation and redirection.  Family member at bedside if possible. Assess for need for glasses and hearing aid (if applicable).    Can continue home psych meds: Trazadone, xanax, memantine, Seroquel. Hold for sedation.     PRN: Seroquel 12.5mg-25mg PO q6h PRN agitation, Zyprexa 1.25mg IM q6h PRN severe agitation, monitor EKG for QTc<500    Check TSH, B12, folate    Pt does not meet criteria for psychiatric hospitalization. Recommend outpt psych f/u at [facility] after d/c- [phone].   [facility] Psych will follow.

## 2024-12-06 NOTE — SWALLOW BEDSIDE ASSESSMENT ADULT - SLP PERTINENT HISTORY OF CURRENT PROBLEM
89 y/o M--pt with moderate to severe cognitive impairment, h/o essential HTN, with prior hospitalization in Aug 2024 for UTI/pyelo with apparent LEFT hydroureteronephrosis and S/P emergent cystoscopy and LEFT ureteral stent placement with patient S/P LEFT craniotomy and subdural haematoma evacuation 11/2/24 at [facility] following initial confusional state, with course complicated with delirium and pulmonary edema. Echo 11/3/24 with LV systolic function with mild to moderate decreased with EF of 40% with regional wall abnormalities present, with pt at the time maintained on valproic acid for mood stabilization (not for seizures) with Seroquel stopped at the time and reduction of Trazodone to 50 mg from 100 mg, with patient on Namenda. Pt seen by Palliative Care at [place] in Aug 2024 with patient DNR/ DNI per note but MOLST obtained from [facility] from [place] unable to ascertain selected option on form signed by the pt's daughter, Mathew Brennan.

## 2024-12-06 NOTE — DIETITIAN INITIAL EVALUATION ADULT - OTHER INFO
dosing wt: 54.4 kg (12/4)  wt hx per Guy HIE: 55.8 kg vs 74.8 kg *likely inaccurate (11/2), 55.3 kg (8/14)  UBW unknown  *Pt with 2.5% wt loss x 1 month, not clinically significant

## 2024-12-06 NOTE — BH CONSULTATION LIAISON ASSESSMENT NOTE - HPI (INCLUDE ILLNESS QUALITY, SEVERITY, DURATION, TIMING, CONTEXT, MODIFYING FACTORS, ASSOCIATED SIGNS AND SYMPTOMS)
89 year old male domiciled in nursing home, with pmh dementia (baseline AOx2 per EMR), HTN, PVD admitted for UTI, psych consulted for AMS. Staff reports that pt has been agitated. On interview pt is AOX1, only to person. Ema that it is November 1926 and he is in [place]. Pt is calm, although he keeps removing clothes. Pt is unable to answer further questions.

## 2024-12-06 NOTE — DIETITIAN INITIAL EVALUATION ADULT - ORAL INTAKE PTA/DIET HISTORY
Chart reviewed, events noted. Pt unable to participate with interview 2/2 dementia/confusion. Per SNF paperwork, Pt was on a mechanical soft regular diet with Ensure 2x/day. NKFA. On previous admission 11/2024, RD note states Pt was advanced to puree diet and also required an NGT on that admission.

## 2024-12-06 NOTE — DIETITIAN INITIAL EVALUATION ADULT - PROBLEM/PLAN-3
#Discharge: do not delete    Patient is __ yo M/F with past medical history of _____  Presented with _____, found to have _____  Problem List/Main Diagnoses (system-based):   Inpatient treatment course:   New medications:   Labs to be followed outpatient:   Exam to be followed outpatient:    #Discharge: do not delete  Pt is a 89 yo Finnish speaking F with PMH bedbound, DNR, HTN, HLD and OA who p/f NH with AMS x1d. I&S for hypercarbic respiratory failure, currently hemodynamically stable and proper oxygenation, awaiting pulmonology recs and S&S eval.     Problem List/Main Diagnoses (system-based):   #Metabolic encephalopathy.   ·  Plan: 2/2 to hyponatremia in the setting of fluid overload and PNA. Per niece, patient has been acting "weird" 1 week prior to hospital admission, and has baseline dementia. Now AAOx1  CTH negative for acute pathology   PT recs WANDA  - c/w home Mirtazepine 15mg nightly  - Monitor for pain control, s/p 1g ofirmev on 9/9/21.    #Acute respiratory failure with hypoxia and hypercapnia.   ·  Plan: 2/2 pulmonary edema and b/l pleural effusions vs PNA. S/p extubation 9/7  Received diuresis lasix 40 IV x2, pulmonary edema improved  Patient still retaining CO2, with elevated bicarb, 36 on 9/9 AM  Unclear cause of overload: No history of smoking, HF, COPD, asthma.  -Monitor oxygen saturation  -f/u pulm consult.    #HTN (hypertension).   ·  Plan: C/w home amlodipine 10mg.    #HLD (hyperlipidemia).   ·  Plan: C/w home atorvastatin 20.    #UTI (urinary tract infection).   ·  Plan: UCx initially showed VRE, but then determined to be contaminated  Patient started on ampicillin for E. faecalis, and pt improving  s/p ampicillin 1g q6 through 9/9.    Inpatient treatment course: Duonebs, amlodipine, atorvastatin, chlorhexidine, enoxaparin, ISS, mirtazapine, nystatin, ampicillin.     New medications:   Labs to be followed outpatient:   Exam to be followed outpatient:    #Discharge: do not delete  89 yo Yi speaking F with PMH bedbound, DNR, HTN, HLD and OA, admitted initially to MICU on 9/5/21 and intubated for AMS, hypercapnic respiratory failure and pulmoary edema in setting of moderate pHTN which resolved with diuresis s/p extubation on 9/7/21, Now with mild persistent hypoxia with imaging consistent with volume overload and persistent pulmonary edema.     Problem List/Main Diagnoses (system-based):   #Metabolic encephalopathy.   2/2 to hyponatremia in the setting of fluid overload and PNA. Per niece, patient has been acting "weird" 1 week prior to hospital admission, and has baseline dementia. CTH negative for acute pathology. Pt continued on home Mirtazepine 15mg nightly. A0x3 upon discharge.     #Acute respiratory failure with hypoxia and hypercapnia.   2/2 pulmonary edema and b/l pleural effusions vs PNA. S/p extubation 9/7. Received diuresis lasix 40 IV x2, pulmonary edema improved. Unclear cause of overload: No history of smoking, HF, COPD, asthma. Pt started on diuretics with improvement in her oxygenation.     #HTN (hypertension).   Continued home amlodipine 10mg.    #HLD (hyperlipidemia).   Continued home atorvastatin 20.    #UTI (urinary tract infection).   UCx initially showed VRE, but then determined to be contaminated. s/p ampicillin.     Inpatient treatment course: Duonebs, amlodipine, atorvastatin, chlorhexidine, enoxaparin, ISS, mirtazapine, nystatin, ampicillin.     New medications: None    Labs to be followed outpatient: None    Exam to be followed outpatient: Respiratory examination   #Discharge: do not delete  89 yo Italian speaking F with PMH bedbound, DNR, HTN, HLD and OA, admitted initially to MICU on 9/5/21 and intubated for AMS, hypercapnic respiratory failure and pulmoary edema in setting of moderate pHTN which resolved with diuresis s/p extubation on 9/7/21, Now with mild persistent hypoxia with imaging consistent with volume overload and persistent pulmonary edema.     Problem List/Main Diagnoses (system-based):   #Metabolic encephalopathy.   2/2 to hyponatremia in the setting of fluid overload and PNA. Per niece, patient has been acting "weird" 1 week prior to hospital admission, and has baseline dementia. CTH negative for acute pathology. Pt continued on home Mirtazepine 15mg nightly. A0x3 upon discharge.     #Acute respiratory failure with hypoxia and hypercapnia.   2/2 pulmonary edema and b/l pleural effusions vs PNA. S/p extubation 9/7. Received diuresis lasix 40 IV x2, pulmonary edema improved. Unclear cause of overload: No history of smoking, HF, COPD, asthma. Pt continued on diuretics with improvement in her oxygenation.     #HTN (hypertension).   Continued home amlodipine 10mg.    #HLD (hyperlipidemia).   Continued home atorvastatin 20.    #UTI (urinary tract infection).   UCx initially showed VRE, but then determined to be contaminated. s/p ampicillin.     Inpatient treatment course: Duonebs, amlodipine, atorvastatin, chlorhexidine, enoxaparin, ISS, mirtazapine, nystatin, ampicillin.     New medications: None    Labs to be followed outpatient: None    Exam to be followed outpatient: Respiratory examination   DISPLAY PLAN FREE TEXT #Discharge: do not delete  89 yo Thai speaking F with PMH bedbound, DNR, HTN, HLD and OA, admitted initially to MICU on 9/5/21 and intubated for AMS, hypercapnic respiratory failure and pulmoary edema in setting of moderate pHTN which resolved with diuresis s/p extubation on 9/7/21, Now with mild persistent hypoxia with imaging consistent with volume overload and persistent pulmonary edema, continued on diuresis.     Problem List/Main Diagnoses (system-based):   #Metabolic encephalopathy.   2/2 to hyponatremia in the setting of fluid overload and PNA. Per niece, patient has been acting "weird" 1 week prior to hospital admission, and has baseline dementia. CTH negative for acute pathology. Pt continued on home Mirtazepine 15mg nightly. A0x3 upon discharge.     #Acute respiratory failure with hypoxia and hypercapnia.   2/2 pulmonary edema and b/l pleural effusions vs PNA. S/p extubation 9/7. Received diuresis lasix 40 IV x2, pulmonary edema improved. Unclear cause of overload: No history of smoking, HF, COPD, asthma. Pt continued on diuretics with improvement in her oxygenation.     #HTN (hypertension).   Continued home amlodipine 10mg.    #HLD (hyperlipidemia).   Continued home atorvastatin 20.    #UTI (urinary tract infection).   UCx initially showed VRE, but then determined to be contaminated. s/p ampicillin.     Inpatient treatment course: Duonebs, amlodipine, atorvastatin, chlorhexidine, enoxaparin, ISS, mirtazapine, nystatin, ampicillin.     New medications: None    Labs to be followed outpatient: None    Exam to be followed outpatient: Respiratory examination

## 2024-12-06 NOTE — DIETITIAN INITIAL EVALUATION ADULT - PERTINENT MEDS FT
MEDICATIONS  (STANDING):  cefTRIAXone   IVPB 1000 milliGRAM(s) IV Intermittent every 24 hours  dextrose 5%. 1000 milliLiter(s) (100 mL/Hr) IV Continuous <Continuous>  dextrose 5%. 1000 milliLiter(s) (50 mL/Hr) IV Continuous <Continuous>  dextrose 5%. 1000 milliLiter(s) (50 mL/Hr) IV Continuous <Continuous>  dextrose 50% Injectable 25 Gram(s) IV Push once  dextrose 50% Injectable 12.5 Gram(s) IV Push once  dextrose 50% Injectable 25 Gram(s) IV Push once  doxazosin 2 milliGRAM(s) Oral at bedtime  glucagon  Injectable 1 milliGRAM(s) IntraMuscular once  lactobacillus acidophilus 1 Tablet(s) Oral daily  metoprolol succinate ER 25 milliGRAM(s) Oral daily  valproic acid 250 milliGRAM(s) Oral every 8 hours    MEDICATIONS  (PRN):  dextrose Oral Gel 15 Gram(s) Oral once PRN Blood Glucose LESS THAN 70 milliGRAM(s)/deciliter

## 2024-12-06 NOTE — BH CONSULTATION LIAISON ASSESSMENT NOTE - NSBHATTESTCOMMENTATTENDFT_PSY_A_CORE
89M living in shelter dementia unit (baseline AOx2 per chart), has adult dtr, with PPHx dementia, prescribed Xanax/Trazodone/Seroquel as outpatient, with PMH significant for essential HTN, with prior hospitalization in Aug 2024 for UTI/pyelo with apparent LEFT hydroureteronephrosis and S/P emergent cystoscopy and LEFT ureteral stent placement with patient S/P LEFT craniotomy and subdural haematoma evacuation 11/2/24 at [place] following initial confusional state, with course complicated with delirium and pulmonary edema, psychiatry consulted for medication management.  Pt seen at bedside, calm, sitting in chair.  Oriented x0 but responds to name, unable to meaningfully answer any questions or identify objects.  Does not follow commands.  Decreased speech, at times nonsensical.  Restless and picking at clothes, trying to remove gown.  Pt on Xanax/Trazodone/Seroquel as OP, was held on admission, pt with increased agitation.  Recommend restarting home psych meds, can hold for sedation.  check labs as above.  Dx: Delirium 2/2 GMC, dementia.  Agree with resident's assessment and plan as above.

## 2024-12-06 NOTE — BH CONSULTATION LIAISON ASSESSMENT NOTE - NSBHCHARTREVIEWINVESTIGATE_PSY_A_CORE FT
< from: 12 Lead ECG (12.04.24 @ 13:53) >      Ventricular Rate 76 BPM    Atrial Rate 76 BPM    P-R Interval 152 ms    QRS Duration 108 ms    Q-T Interval 414 ms    QTC Calculation(Bazett) 465 ms    P Axis 64 degrees    R Axis 3 degrees    T Axis 45 degrees    Diagnosis Line Normal sinus rhythm  Incomplete right bundle branch block  Nonspecific ST abnormality    Confirmed by IRVIN OSWALD ([id number]) on 12/4/2024 3:26:38 PM    < end of copied text >

## 2024-12-06 NOTE — SWALLOW BEDSIDE ASSESSMENT ADULT - SWALLOW EVAL: DIAGNOSIS
89yo with mod to severe cognitive impairment, essential HTN, with hospitalization 8/2024 for UTI/pyelo, S/P emergent cystoscopy and LEFT ureteral stent, admission in 11/24 found with L SDH, s/p L craniotomy/ evac, Currently admitted with Cystitis, on IV abx, Metabolic encephalopathy. Pt presents with evidence of an oropharyngeal dysphagia notable for suspected reduced hyolaryngeal elevation on palpation and delayed wet-sounding cough after limited trials of moderately thick liquids and puree textures. Pt is deemed at increased aspiration risk with oral feeding at this time. However, given palliative notes documenting "goal of promoting patient's comfort and dignity at end-of-life," would suggest further discussion of GOC with regard to provision of nutrition/hydration. This service will remain available if clinically indicated and in keeping with Pt and family wishes.

## 2024-12-06 NOTE — SWALLOW BEDSIDE ASSESSMENT ADULT - COMMENTS
89 y/o M--pt with moderate to severe cognitive impairment, history of essential HTN, with prior hospitalization in Aug 2024 for UTI/pyelo with apparent LEFT hydroureteronephrosis and S/P emergent cystoscopy and LEFT ureteral stent placement with patient S/P LEFT craniotomy and subdural haematoma evacuation 11/2/24 at [place] following initial confusional state, with course complicated with delirium and pulmonary oedema.   Echo at 11/3/24 with LV systolic function with mild to moderate decreased with EF of 40% with regional wall abnormalities present, with patient at the time maintained on valproic acid for mood stabilization (not for seizures) with Seroquel stopped at the time and reduction of Trazodone to 50 mg from 100 mg, with patient on Namenda.  Patient was seen by Palliative Care at [place] in Aug 2024 wit patient DNR/ Do Not Intubate per note but MOLST obtained from [facility] from [place] unable to ascertain selected option on form signed by the patient's daughter, Mathew Brennan.  I left a general message with the daughter by voice mail with my name and office number only.  Unable to obtain history from patient.    Patient is oriented to self/hospital (?) with patient receiving haloperidol earlier in the ER.  Patient presently calm and offers no complaint.    Patient with report of increased confusion at [facility] at [place]--was transferred following course at [facility] following [place] discharge.    Neuro Consulted for prior SDH  Course: Cystitis, on IV abx, f/u cultures. S/P subdural hematoma evacuation; Nsx consulted, stable for now. Essential hypertension, Stable. Metabolic encephalopathy, aspiration and fall precautions, IV abx     SWALLOW HX: Seen for Bedside Swallow Eval 11/5: Rx NPO, with non-oral nutrition/hydration/medications. Patient with report of increased confusion at [facility] at [place]--was transferred following course at [facility] following [facility] discharge.    Neuro Consulted for prior SDH    Course: Cystitis, on IV abx, f/u cultures. S/P subdural hematoma evacuation; Nsx consulted, stable for now. Essential hypertension, Stable. Metabolic encephalopathy, aspiration and fall precautions, IV abx   Per Palliative: Pt with DNR/DNI order, MOLST in chart; "Recommend discontinuation of all medications and interventions that do not serve goal of promoting patient's comfort and dignity at end-of-life."    SWALLOW HX: Seen for Bedside Swallow Eval 11/5: Rx NPO, with non-oral nutrition/hydration/medications.

## 2024-12-06 NOTE — DIETITIAN INITIAL EVALUATION ADULT - ENERGY INTAKE
NPO Pt NPO x 1 day, pending SLP gerry. Pt reports he feels hungry. Unable to offer food preferences. Labs reviewed. Would benefit from oral nutrition supplements once diet able to be advanced.

## 2024-12-06 NOTE — DIETITIAN INITIAL EVALUATION ADULT - REASON FOR ADMISSION
Per chart, Pt is a 89 y/o M with PMH: "mod-severe cognitive impairment, HTN, hx of L hydroureteronephrosis w/ L stent placement, L craniotomy w/ SDH 11/2/24 at  [facility], TTE 11/3/24 w/  LV systolic function w/ mild-mod  EF of 40% w/ regional wall abnormalities, on valproic acid for mood stabalization p/w c/f SDH from [place]."

## 2024-12-06 NOTE — DIETITIAN INITIAL EVALUATION ADULT - NUTRITION DIAGNOSITC TERMINOLOGY #1
pt bib wheelchair s/p rolling R ankle today. allergic to penicillin, gabapentin. pmh HTL, HLD Underweight/Malnutrition...

## 2024-12-06 NOTE — PROVIDER CONTACT NOTE (HYPOGLYCEMIA EVENT) - NS PROVIDER CONTACT NOTE-PROVIDER NOTIFIED-HYPO
Exercise 30 minutes most days of the week  Sleep 6-8 hours each night if possible  Low fat, low cholesterol diet   we discussed prescribed medications and how to take them   make sure you get results of any labs/studies ordered today  Low glycemic index diet     See dr madrid for consult regarding a fib     See dietician about  Diet to start weight loss   See me 1 month  
FANTA Alvarado

## 2024-12-07 ENCOUNTER — TRANSCRIPTION ENCOUNTER (OUTPATIENT)
Age: 88
End: 2024-12-07

## 2024-12-07 LAB
24R-OH-CALCIDIOL SERPL-MCNC: 39.2 NG/ML — SIGNIFICANT CHANGE UP (ref 30–80)
ANION GAP SERPL CALC-SCNC: 15 MMOL/L — SIGNIFICANT CHANGE UP (ref 5–17)
BUN SERPL-MCNC: 55 MG/DL — HIGH (ref 7–23)
CALCIUM SERPL-MCNC: 9.3 MG/DL — SIGNIFICANT CHANGE UP (ref 8.4–10.5)
CHLORIDE SERPL-SCNC: 112 MMOL/L — HIGH (ref 96–108)
CO2 SERPL-SCNC: 22 MMOL/L — SIGNIFICANT CHANGE UP (ref 22–31)
CREAT SERPL-MCNC: 1.07 MG/DL — SIGNIFICANT CHANGE UP (ref 0.5–1.3)
EGFR: 63 ML/MIN/1.73M2 — SIGNIFICANT CHANGE UP
FOLATE SERPL-MCNC: >20 NG/ML — SIGNIFICANT CHANGE UP
GLUCOSE BLDC GLUCOMTR-MCNC: 79 MG/DL — SIGNIFICANT CHANGE UP (ref 70–99)
GLUCOSE BLDC GLUCOMTR-MCNC: 86 MG/DL — SIGNIFICANT CHANGE UP (ref 70–99)
GLUCOSE SERPL-MCNC: 80 MG/DL — SIGNIFICANT CHANGE UP (ref 70–99)
HCT VFR BLD CALC: 34.7 % — LOW (ref 39–50)
HGB BLD-MCNC: 11.3 G/DL — LOW (ref 13–17)
MCHC RBC-ENTMCNC: 30.5 PG — SIGNIFICANT CHANGE UP (ref 27–34)
MCHC RBC-ENTMCNC: 32.6 G/DL — SIGNIFICANT CHANGE UP (ref 32–36)
MCV RBC AUTO: 93.5 FL — SIGNIFICANT CHANGE UP (ref 80–100)
NRBC # BLD: 0 /100 WBCS — SIGNIFICANT CHANGE UP (ref 0–0)
PLATELET # BLD AUTO: 190 K/UL — SIGNIFICANT CHANGE UP (ref 150–400)
POTASSIUM SERPL-MCNC: 4.2 MMOL/L — SIGNIFICANT CHANGE UP (ref 3.5–5.3)
POTASSIUM SERPL-SCNC: 4.2 MMOL/L — SIGNIFICANT CHANGE UP (ref 3.5–5.3)
RBC # BLD: 3.71 M/UL — LOW (ref 4.2–5.8)
RBC # FLD: 15.7 % — HIGH (ref 10.3–14.5)
SODIUM SERPL-SCNC: 149 MMOL/L — HIGH (ref 135–145)
T4 FREE SERPL-MCNC: 1.3 NG/DL — SIGNIFICANT CHANGE UP (ref 0.9–1.7)
TSH SERPL-MCNC: 0.11 UIU/ML — LOW (ref 0.27–4.2)
VIT B12 SERPL-MCNC: 1341 PG/ML — HIGH (ref 232–1245)
WBC # BLD: 4.59 K/UL — SIGNIFICANT CHANGE UP (ref 3.8–10.5)
WBC # FLD AUTO: 4.59 K/UL — SIGNIFICANT CHANGE UP (ref 3.8–10.5)

## 2024-12-07 RX ORDER — DIVALPROEX SODIUM 500 MG
250 TABLET, DELAYED RELEASE (ENTERIC COATED) ORAL EVERY 8 HOURS
Refills: 0 | Status: DISCONTINUED | OUTPATIENT
Start: 2024-12-07 | End: 2024-12-10

## 2024-12-07 RX ADMIN — Medication 250 MILLIGRAM(S): at 15:33

## 2024-12-07 RX ADMIN — MEMANTINE HYDROCHLORIDE 5 MILLIGRAM(S): 14 CAPSULE, EXTENDED RELEASE ORAL at 12:35

## 2024-12-07 RX ADMIN — TRAZODONE HYDROCHLORIDE 50 MILLIGRAM(S): 150 TABLET ORAL at 22:00

## 2024-12-07 RX ADMIN — Medication 2 MILLIGRAM(S): at 22:00

## 2024-12-07 RX ADMIN — Medication 1 TABLET(S): at 12:35

## 2024-12-07 RX ADMIN — VALPROIC ACID 250 MILLIGRAM(S): 250 SOLUTION ORAL at 06:40

## 2024-12-07 RX ADMIN — Medication 100 MILLIGRAM(S): at 06:41

## 2024-12-07 RX ADMIN — METOPROLOL TARTRATE 25 MILLIGRAM(S): 100 TABLET, FILM COATED ORAL at 06:40

## 2024-12-07 RX ADMIN — Medication 250 MILLIGRAM(S): at 22:00

## 2024-12-07 RX ADMIN — Medication 12.5 MILLIGRAM(S): at 12:35

## 2024-12-07 RX ADMIN — Medication 10 MILLIGRAM(S): at 22:00

## 2024-12-07 NOTE — DISCHARGE NOTE PROVIDER - PROVIDER TOKENS
FREE:[LAST:[Home Hospice Providers],PHONE:[(   )    -],FAX:[(   )    -]],FREE:[LAST:[Assisted Living facility Primary Care Team],PHONE:[(   )    -],FAX:[(   )    -],FOLLOWUP:[1 week],ESTABLISHEDPATIENT:[T]]

## 2024-12-07 NOTE — PHYSICAL THERAPY INITIAL EVALUATION ADULT - TRANSFER TRAINING, PT EVAL
GOAL: Pt will transfer sit<->stand with modAx1 CGA to improve overall ease with transfers in 2 weeks.

## 2024-12-07 NOTE — DISCHARGE NOTE PROVIDER - REASON FOR ADMISSION
Transferred from the [facility] to [place] for subdural haematoma Transferred from the [facility] ED to [place] for subdural hematoma Transferred from the [facility] ED to [place] for subdural haematoma

## 2024-12-07 NOTE — PHYSICAL THERAPY INITIAL EVALUATION ADULT - PERTINENT HX OF CURRENT PROBLEM, REHAB EVAL
Pt is a 89yo M  p/w c/f SDH from [place] PMHx mod-severe cognitive impairment, HTN, hx of L hydroureteronephrosis w/ L stent placement, L craniotomy w/ SDH 11/2/24 at  [facility], TTE 11/3/24 w/  LV systolic function w/ mild-mod EF of  40% w/ regional wall abnormalities, on valproic acid for mood stabilization.   CTH: No adverse change, without interval rebleeding. Chronic lacunar infarct and senescent cerebral changes.

## 2024-12-07 NOTE — PROGRESS NOTE ADULT - CONVERSATION DETAILS
GOC discussed with daughter with [facility] present  daughter informed that pt failed speech and swallow  daughter refused NGT, PEG , she wants pleasure feeds  daughter agreed for DNR, DNI, DNH  COMFORT MEASURES ONLY    pt to return to assisted living with hospice     Time spent 40 min

## 2024-12-07 NOTE — DISCHARGE NOTE PROVIDER - DETAILS OF MALNUTRITION DIAGNOSIS/DIAGNOSES
This patient has been assessed with a concern for Malnutrition and was treated during this hospitalization for the following Nutrition diagnosis/diagnoses:     -  12/06/2024: Moderate protein-calorie malnutrition   -  12/06/2024: Underweight (BMI < 19)

## 2024-12-07 NOTE — DISCHARGE NOTE PROVIDER - NSDCMRMEDTOKEN_GEN_ALL_CORE_FT
ALPRAZolam 0.25 mg oral tablet: 1 tab(s) orally 2 times a day as needed for  anxiety  Cerovite Senior oral tablet: 1 tab(s) orally once a day  docusate sodium 100 mg oral capsule: 1 cap(s) orally once a day  doxazosin 2 mg oral tablet: 1 tab(s) orally once a day  Floranex oral tablet: 1 tab(s) orally once a day  memantine 5 mg oral tablet: 1 tab(s) orally once a day  metoprolol succinate 25 mg oral tablet, extended release: 1 tab(s) orally once a day  PreserVision AREDS oral capsule: 1 cap(s) orally once a day  Seroquel 25 mg oral tablet: 0.5 tab(s) orally once a day  simvastatin 20 mg oral tablet: 1 tab(s) orally once a day  traZODone 50 mg oral tablet: 1 tab(s) orally once a day (at bedtime)  valproic acid 250 mg oral capsule: 1 cap(s) orally 3 times a day   ALPRAZolam 0.25 mg oral tablet: 1 tab(s) orally 2 times a day as needed for  anxiety  Cerovite Senior oral tablet: 1 tab(s) orally once a day  doxazosin 2 mg oral tablet: 1 tab(s) orally once a day  Floranex oral tablet: 1 tab(s) orally once a day  memantine 5 mg oral tablet: 1 tab(s) orally once a day  metoprolol succinate 25 mg oral tablet, extended release: 1 tab(s) orally once a day  PreserVision AREDS oral capsule: 1 cap(s) orally once a day  Seroquel 25 mg oral tablet: 0.5 tab(s) orally once a day  simvastatin 20 mg oral tablet: 1 tab(s) orally once a day  traZODone 50 mg oral tablet: 1 tab(s) orally once a day (at bedtime)  valproic acid 250 mg oral capsule: 1 cap(s) orally 3 times a day

## 2024-12-07 NOTE — PHYSICAL THERAPY INITIAL EVALUATION ADULT - NSPTDISCHREC_GEN_A_CORE
Continue HPT services with 24/7 assist with all ADLs/functional mobility./Home PT Continue previous [facility] services with 24/7 assist with all ADLs/functional mobility./Home PT

## 2024-12-07 NOTE — DISCHARGE NOTE PROVIDER - NSDCFUADDAPPT_GEN_ALL_CORE_FT
APPTS ARE READY TO BE MADE: [ ] YES    Best Family or Patient Contact (if needed):    Additional Information about above appointments (if needed):    1: Home Hospice Team  2: Primary Care if within goals of care  3: Neurology if within goals of care   4: outpt psych f/u at [facility] after d/c- [phone].     Other comments or requests:    APPTS ARE READY TO BE MADE: [X ] YES    Best Family or Patient Contact (if needed): Mathew Brennan (daughter) [phone]     Additional Information about above appointments (if needed):    1: Home Hospice Team  2: Primary Care if within goals of care  3: Neurology if within goals of care   [phone] if within goals of care    Other comments or requests:    APPTS ARE READY TO BE MADE: [X ] YES    Best Family or Patient Contact (if needed): Mathew Brennan (daughter) [phone]     Additional Information about above appointments (if needed):    1: Home Hospice Team  2: Primary Care if within goals of care  3: Neurology if within goals of care   [facility] [phone] if within goals of care    Other comments or requests:     Patient is being discharged to home hospice. Caregiver will arrange follow up.

## 2024-12-07 NOTE — PHYSICAL THERAPY INITIAL EVALUATION ADULT - ADDITIONAL COMMENTS
Pt is a poor historian; As per case coordinator note; Pt resides at [facility] memory care unit. Pt ambulates with a rolling walker at baseline and assist with all ADLs. Pt owns wheelchair. Spoke to daughter, staff assists with ambulating as per ~ a month ago since fall, and pt spends more time in wheelchair. Pt has been receiving PT services twice a week at [facility]. Pt is a poor historian; Spoke to daughter, Mathew, on phone. Pt resides at [facility] memory care unit. Since the fall a month ago, pt has been ambulating with assist with rolling walker and wheelchair for longer distances. Prior to fall, pt was ambulating independently with rolling walker. pt has assist with all ADLs. Pt has been receiving [facility] services twice a week for last month.

## 2024-12-07 NOTE — PHYSICAL THERAPY INITIAL EVALUATION ADULT - NSPTDMEREC_GEN_A_CORE
pt owns all necessary DME Pt owns wheelchair and rolling walker./patient handling equipment/hospital bed

## 2024-12-07 NOTE — PHYSICAL THERAPY INITIAL EVALUATION ADULT - GENERAL OBSERVATIONS, REHAB EVAL
Pt received supine in bed, A&0x1, not following commands. Pt presents to [facility] transferred from the [place] ED to [place] for subdural haematoma. Pt with HTN, hx of L hydroureteronephrosis w/ L stent placement, L craniotomy w/ SDH 11/2/24 at  [facility], TTE 11/3/24 w/  LV systolic function w/ mild-mod EF of  40% w/ regional wall abnormalities, on valproic acid for mood stabilization. CTH w/ no change in previous SDH.

## 2024-12-07 NOTE — DISCHARGE NOTE PROVIDER - CARE PROVIDER_API CALL
Home Hospice Providers,   Phone: (   )    -  Fax: (   )    -  Follow Up Time:     Assisted Living facility Primary Care Team,   Phone: (   )    -  Fax: (   )    -  Established Patient  Follow Up Time: 1 week

## 2024-12-07 NOTE — DISCHARGE NOTE PROVIDER - NSDCCPCAREPLAN_GEN_ALL_CORE_FT
PRINCIPAL DISCHARGE DIAGNOSIS  Diagnosis: Acute UTI  Assessment and Plan of Treatment: You were treated with IV antibiotics while in the hospital. Follow up with your Home Hospice medical team. Follow up with your Primary Care team at your assisted living facility if desired within goals of care. Follow up with Behavioral Health and Neurology for further evaluation if within goals of care.      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  HOME CARE INSTRUCTIONS  The care of individuals with dementia is varied and dependent upon the progression of the dementia. The following suggestions are intended for the person living with, or caring for, the person with dementia.  Create a safe environment.  Remove the locks on bathroom doors to prevent the person from accidentally locking himself or herself in.  Use childproof latches on kitchen cabinets and any place where cleaning supplies, chemicals, or alcohol are kept.  Use childproof covers in unused electrical outlets.  Install childproof devices to keep doors and windows secured.  Remove stove knobs or install safety knobs and an automatic shut-off on the stove.  Lower the temperature on water heaters.  Label medicines and keep them locked up.  Secure knives, lighters, matches, power tools, and guns, and keep these items out of reach.  Keep the house free from clutter. Remove rugs or anything that might contribute to a fall.  Remove objects that might break and hurt the person.  Make sure lighting is good, both inside and outside.  Install grab rails as needed.  Use a monitoring device to alert you to falls or other needs for help.   Reduce confusion.  Keep familiar objects and people around.  Use night lights or dim lights at night.  Label items or areas.  Use reminders, notes, or directions for daily activities or tasks.Keep a simple, consistent routine for waking, meals, bathing, dressing, and bedtime  Create a calm, quiet environment.  Place large clocks and calendars prominently.  Display emergency numbers and home address near all telephones..  Have a consistent nighttime routine.  Ensure a regular walking or physical activity schedule. Involve the person in daily activities as much as possible.  Limit napping during the day.  Limit caffeine.  Attend social events that stimulate rather than overwhelm the senses.  Encourage good nutrition and hydration.  SEEK MEDICAL CARE IF:  New behav    Diagnosis: Advance care planning  Assessment and Plan of Treatment: Home hospice referral sent. Follow up with your home Hospice medical team after discharge.    Diagnosis: S/P subdural hematoma evacuation  Assessment and Plan of Treatment: Repeat CT heads this admission showed no new or worsening bleed/hematoma. Stable and improving compared to prior OSH imaging. Follow up with your Primary Care team at your assisted living facility if desired within goals of care.    Diagnosis: AMS (altered mental status)  Assessment and Plan of Treatment: 2/2 UTI and dementia. Follow up with your Primary Care team at your assisted living facility if desired within goals of care. Follow up with Behavioral Health and Neurology for further evaluation if within goals of care.     PRINCIPAL DISCHARGE DIAGNOSIS  Diagnosis: Acute UTI  Assessment and Plan of Treatment: You were treated with IV antibiotics while in the hospital. Follow up with your Home Hospice medical team. Follow up with your Primary Care team at your assisted living facility if desired within goals of care. Follow up with Behavioral Health and Neurology for further evaluation if within goals of care.      SECONDARY DISCHARGE DIAGNOSES  Diagnosis: AMS (altered mental status)  Assessment and Plan of Treatment: 2/2 UTI and dementia    Diagnosis: S/P subdural hematoma evacuation  Assessment and Plan of Treatment: Repeat CT heads this admission showed no new or worsening bleed/hematoma. Stable and improving compared to prior OSH imaging. Follow up with your Primary Care team at your assisted living facility if desired within goals of care.    Diagnosis: Advance care planning  Assessment and Plan of Treatment: Home hospice referral sent. Follow up with your home Hospice medical team after discharge.

## 2024-12-07 NOTE — DISCHARGE NOTE PROVIDER - HOSPITAL COURSE
HPI:  NIGHT HOSPITALIST:    Patient UNKNOWN to me previously, assigned to me at this point via the ER and by Dr. Bowden to admit this 89 y/o M--patient with moderate to severe cognitive impairment, history of essential HTN, with prior hospitalization in Aug 2024 for UTI/pyelo with apparent LEFT hydroureteronephrosis and S/P emergent cystoscopy and LEFT ureteral stent placement with patient S/P LEFT craniotomy and subdural haematoma evacuation 11/2/24 at [place] following initial confusional state, with course complicated with delirium and pulmonary oedema.   Echo at 11/3/24 with LV systolic function with mild to moderate decreased with EF of 40% with regional wall abnormalities present, with patient at the time maintained on valproic acid for mood stabilization (not for seizures) with Seroquel stopped at the time and reduction of Trazodone to 50 mg from 100 mg, with patient on Namenda.  Patient was seen by Palliative Care at [facility] in Aug 2024 wit patient DNR/ Do Not Intubate per note but MOLST obtained from [facility] from [facility] unable to ascertain selected option on form signed by the patient's daughter, Mathew Brennan.  I left a general message with the daughter by voice mail with my name and office number only.  Unable to obtain history from patient.    Patient is oriented to self/hospital (?) with patient receiving haloperidol earlier in the ER.  Patient presently calm and offers no complaint.    Patient with report of increased confusion at [facility] at [facility]--[facility] [place] [facility]. (05 Dec 2024 02:11)      Hospital Course:  UA positive on admission, urine culture grew gram negative rods, treated with IV ceftriaxone while admitted. CT Head on admission showed tatus post left parietal craniotomy, underlying mixed attenuation subdural hematoma overlying the left frontal convexity measuring up to 1.7 cm. No significant mass effect on the underlying parenchyma. CT Head repeated same day, no acute findings. Neurosurgery was consulted, per neurosurgery CTH results look improved from prior SDH discharge. Neurology was consulted for AMS, labs checked, outpatient follow up if within goals of care. Psych was consulted for AMS, home psych meds resumed. PT evaluated patient and recommended resumption of prior home PT. Patient failed Speech Language Pathology swallow evaluation. Goals of care discussed with next of kin daughter, decided on pleasure feeds and home hospice referral. Discharge plan with medication reconciliation discussed with attending  _____________________. Patient is medically cleared for discharge home with home care and home hospice.       Important/Active Issues for Follow-Up:  Hx subdural hematoma- Neurosurgery  Moderate to severe cognitive impairment- Primary Care, Neurology  Cystitis- Primary Care    Medication Changes and Reason:  ****************************    Advance Directives:  [ ] Full code [x] Hospice [x ] DNR    Discharge Diagnoses:  acute UTI  dysphagia  moderate to severe cognitive impairment  SDH   HPI:  NIGHT HOSPITALIST:    Patient UNKNOWN to me previously, assigned to me at this point via the ER and by Dr. Bowden to admit this 89 y/o M--patient with moderate to severe cognitive impairment, history of essential HTN, with prior hospitalization in Aug 2024 for UTI/pyelo with apparent LEFT hydroureteronephrosis and S/P emergent cystoscopy and LEFT ureteral stent placement with patient S/P LEFT craniotomy and subdural haematoma evacuation 11/2/24 at [place] following initial confusional state, with course complicated with delirium and pulmonary oedema.   Echo at 11/3/24 with LV systolic function with mild to moderate decreased with EF of 40% with regional wall abnormalities present, with patient at the time maintained on valproic acid for mood stabilization (not for seizures) with Seroquel stopped at the time and reduction of Trazodone to 50 mg from 100 mg, with patient on Namenda.  Patient was seen by Palliative Care at [place] in Aug 2024 wit patient DNR/ Do Not Intubate per note but MOLST obtained from [facility] from [place] unable to ascertain selected option on form signed by the patient's daughter, Mathew Brennan.  I left a general message with the daughter by voice mail with my name and office number only.  Unable to obtain history from patient.    Patient is oriented to self/hospital (?) with patient receiving haloperidol earlier in the ER.  Patient presently calm and offers no complaint.    Patient with report of increased confusion at [facility] at [place]--[facility] [place] [facility]. (05 Dec 2024 02:11)      Hospital Course:  UA positive on admission, urine culture grew gram negative rods, treated with IV ceftriaxone while admitted. CT Head on admission showed tatus post left parietal craniotomy, underlying mixed attenuation subdural hematoma overlying the left frontal convexity measuring up to 1.7 cm. No significant mass effect on the underlying parenchyma. CT Head repeated same day, no acute findings. Neurosurgery was consulted, per neurosurgery CTH results look improved from prior SDH discharge. Neurology was consulted for AMS, labs checked, outpatient follow up if within goals of care. Psych was consulted for AMS, home psych meds resumed. PT evaluated patient and recommended resumption of prior home PT. Patient failed Speech Language Pathology swallow evaluation. Goals of care discussed with next of kin daughter, decided on pleasure feeds and home hospice referral. Discharge plan with medication reconciliation discussed with attending Dr. Bowden. Patient is medically cleared for discharge home with home care and home hospice.       Important/Active Issues for Follow-Up:  Hx subdural hematoma- Neurosurgery  Moderate to severe cognitive impairment- Primary Care, Neurology  Cystitis- Primary Care    Medication Changes and Reason:  see med rec    Advance Directives:  [ ] Full code [x] Hospice [x ] DNR    Discharge Diagnoses:  acute UTI  dysphagia  moderate to severe cognitive impairment  SDH

## 2024-12-08 LAB
-  AMPICILLIN/SULBACTAM: SIGNIFICANT CHANGE UP
-  AMPICILLIN: SIGNIFICANT CHANGE UP
-  AZTREONAM: SIGNIFICANT CHANGE UP
-  CEFAZOLIN: SIGNIFICANT CHANGE UP
-  CEFEPIME: SIGNIFICANT CHANGE UP
-  CEFTRIAXONE: SIGNIFICANT CHANGE UP
-  CEFUROXIME: SIGNIFICANT CHANGE UP
-  CIPROFLOXACIN: SIGNIFICANT CHANGE UP
-  ERTAPENEM: SIGNIFICANT CHANGE UP
-  GENTAMICIN: SIGNIFICANT CHANGE UP
-  LEVOFLOXACIN: SIGNIFICANT CHANGE UP
-  MEROPENEM: SIGNIFICANT CHANGE UP
-  NITROFURANTOIN: SIGNIFICANT CHANGE UP
-  PIPERACILLIN/TAZOBACTAM: SIGNIFICANT CHANGE UP
-  TOBRAMYCIN: SIGNIFICANT CHANGE UP
-  TRIMETHOPRIM/SULFAMETHOXAZOLE: SIGNIFICANT CHANGE UP
ANION GAP SERPL CALC-SCNC: 15 MMOL/L — SIGNIFICANT CHANGE UP (ref 5–17)
BUN SERPL-MCNC: 50 MG/DL — HIGH (ref 7–23)
CALCIUM SERPL-MCNC: 9 MG/DL — SIGNIFICANT CHANGE UP (ref 8.4–10.5)
CHLORIDE SERPL-SCNC: 113 MMOL/L — HIGH (ref 96–108)
CO2 SERPL-SCNC: 21 MMOL/L — LOW (ref 22–31)
CREAT SERPL-MCNC: 0.47 MG/DL — LOW (ref 0.5–1.3)
CULTURE RESULTS: ABNORMAL
EGFR: 94 ML/MIN/1.73M2 — SIGNIFICANT CHANGE UP
GLUCOSE SERPL-MCNC: 79 MG/DL — SIGNIFICANT CHANGE UP (ref 70–99)
METHOD TYPE: SIGNIFICANT CHANGE UP
ORGANISM # SPEC MICROSCOPIC CNT: ABNORMAL
ORGANISM # SPEC MICROSCOPIC CNT: SIGNIFICANT CHANGE UP
POTASSIUM SERPL-MCNC: 3.9 MMOL/L — SIGNIFICANT CHANGE UP (ref 3.5–5.3)
POTASSIUM SERPL-SCNC: 3.9 MMOL/L — SIGNIFICANT CHANGE UP (ref 3.5–5.3)
SODIUM SERPL-SCNC: 149 MMOL/L — HIGH (ref 135–145)
SPECIMEN SOURCE: SIGNIFICANT CHANGE UP

## 2024-12-08 RX ORDER — 0.9 % SODIUM CHLORIDE 0.9 %
1000 INTRAVENOUS SOLUTION INTRAVENOUS
Refills: 0 | Status: DISCONTINUED | OUTPATIENT
Start: 2024-12-08 | End: 2024-12-10

## 2024-12-08 RX ADMIN — METOPROLOL TARTRATE 25 MILLIGRAM(S): 100 TABLET, FILM COATED ORAL at 05:47

## 2024-12-08 RX ADMIN — Medication 12.5 MILLIGRAM(S): at 13:11

## 2024-12-08 RX ADMIN — Medication 1 TABLET(S): at 13:11

## 2024-12-08 RX ADMIN — Medication 250 MILLIGRAM(S): at 05:34

## 2024-12-08 RX ADMIN — Medication 10 MILLIGRAM(S): at 21:46

## 2024-12-08 RX ADMIN — Medication 2 MILLIGRAM(S): at 21:46

## 2024-12-08 RX ADMIN — MEMANTINE HYDROCHLORIDE 5 MILLIGRAM(S): 14 CAPSULE, EXTENDED RELEASE ORAL at 13:12

## 2024-12-08 RX ADMIN — Medication 60 MILLILITER(S): at 15:04

## 2024-12-08 RX ADMIN — TRAZODONE HYDROCHLORIDE 50 MILLIGRAM(S): 150 TABLET ORAL at 21:46

## 2024-12-08 RX ADMIN — Medication 250 MILLIGRAM(S): at 22:33

## 2024-12-09 ENCOUNTER — TRANSCRIPTION ENCOUNTER (OUTPATIENT)
Age: 88
End: 2024-12-09

## 2024-12-09 RX ADMIN — Medication 12.5 MILLIGRAM(S): at 11:06

## 2024-12-09 RX ADMIN — METOPROLOL TARTRATE 25 MILLIGRAM(S): 100 TABLET, FILM COATED ORAL at 05:31

## 2024-12-09 RX ADMIN — Medication 2 MILLIGRAM(S): at 21:13

## 2024-12-09 RX ADMIN — Medication 250 MILLIGRAM(S): at 21:13

## 2024-12-09 RX ADMIN — Medication 10 MILLIGRAM(S): at 21:13

## 2024-12-09 RX ADMIN — TRAZODONE HYDROCHLORIDE 50 MILLIGRAM(S): 150 TABLET ORAL at 21:13

## 2024-12-09 RX ADMIN — MEMANTINE HYDROCHLORIDE 5 MILLIGRAM(S): 14 CAPSULE, EXTENDED RELEASE ORAL at 11:06

## 2024-12-09 RX ADMIN — Medication 250 MILLIGRAM(S): at 05:31

## 2024-12-09 RX ADMIN — Medication 1 TABLET(S): at 11:07

## 2024-12-09 RX ADMIN — Medication 250 MILLIGRAM(S): at 14:05

## 2024-12-09 RX ADMIN — Medication 60 MILLILITER(S): at 05:39

## 2024-12-09 NOTE — CONSULT NOTE ADULT - REASON FOR ADMISSION
Transferred from the [place] ED to [place] for subdural haematoma
Transferred from the [place] ED to [place] for subdural haematoma
Transferred from the [place] ED to [place] for subdural haematoma
Transferred from the [place] ED to [place] for subdural hematoma
Transferred from the [place] ED to [place] for subdural hematoma

## 2024-12-09 NOTE — DISCHARGE NOTE NURSING/CASE MANAGEMENT/SOCIAL WORK - NSDCVIVACCINE_GEN_ALL_CORE_FT
Tdap; 27-Mar-2022 16:28; Paz Peters (RN); [employer]; [id number] (Exp. Date: 18-Sep-2023); IntraMuscular; Deltoid Left.; 0.5 milliLiter(s); VIS (VIS Published: 09-May-2013, VIS Presented: 27-Mar-2022);   Tdap; 07-Aug-2024 15:55; Yayo Bartholomew (IGOR); [employer]; [id number] (Exp. Date: 10-Feb-2026); IntraMuscular; Deltoid Left.; 0.5 milliLiter(s); VIS (VIS Published: 09-May-2013, VIS Presented: 07-Aug-2024);

## 2024-12-09 NOTE — DISCHARGE NOTE NURSING/CASE MANAGEMENT/SOCIAL WORK - FINANCIAL ASSISTANCE
[facility] provides services at a reduced cost to those who are determined to be eligible through [facility]’s financial assistance program. Information regarding [facility]’s financial assistance program can be found by going to [email or web address] or by calling [phone].

## 2024-12-09 NOTE — DISCHARGE NOTE NURSING/CASE MANAGEMENT/SOCIAL WORK - NSDCFUADDAPPT_GEN_ALL_CORE_FT
APPTS ARE READY TO BE MADE: [X ] YES    Best Family or Patient Contact (if needed): Mathew Brennan (daughter) [phone]     Additional Information about above appointments (if needed):    1: Home Hospice Team  2: Primary Care if within goals of care  3: Neurology if within goals of care   4: [facility], [phone] if within goals of care    Other comments or requests:

## 2024-12-09 NOTE — PROGRESS NOTE ADULT - NUTRITIONAL ASSESSMENT
This patient has been assessed with a concern for Malnutrition and has been determined to have a diagnosis/diagnoses of Moderate protein-calorie malnutrition and Underweight (BMI < 19).    This patient is being managed with:   Diet NPO-  Except Medications  With Ice Chips/Sips of Water  Entered: Dec  5 2024  2:16AM  
This patient has been assessed with a concern for Malnutrition and has been determined to have a diagnosis/diagnoses of Moderate protein-calorie malnutrition and Underweight (BMI < 19).    This patient is being managed with:   Diet Pureed-  Entered: Dec  7 2024  1:30PM  
This patient has been assessed with a concern for Malnutrition and has been determined to have a diagnosis/diagnoses of Moderate protein-calorie malnutrition and Underweight (BMI < 19).    This patient is being managed with:   Diet Regular-  Entered: Dec  7 2024 12:18PM  
This patient has been assessed with a concern for Malnutrition and has been determined to have a diagnosis/diagnoses of Moderate protein-calorie malnutrition and Underweight (BMI < 19).    This patient is being managed with:   Diet Pureed-  Entered: Dec  7 2024  1:30PM

## 2024-12-09 NOTE — DISCHARGE NOTE NURSING/CASE MANAGEMENT/SOCIAL WORK - PATIENT PORTAL LINK FT
You can access the FollowMyHealth Patient Portal offered by [facility] by registering at the following website: [email or web address]. By joining [facility]’s FollowMyHealth portal, you will also be able to view your health information using other applications (apps) compatible with our system.

## 2024-12-09 NOTE — CONSULT NOTE ADULT - CONSULT REASON
assist with DAVID
transfer
AMS
AMS  dementia   SDH
sacral/bilateral buttocks skin damage
Cardiac Management

## 2024-12-09 NOTE — CONSULT NOTE ADULT - ASSESSMENT
89 y/o M  with moderate to severe cognitive impairment, HTN, with prior hospitalization in Aug 2024 for UTI/pyelo with apparent LEFT hydroureteronephrosis and S/P emergent cystoscopy and LEFT ureteral stent placement with patient S/P LEFT craniotomy and subdural haematoma evacuation 11/2/24 at [place] following initial confusional state, with course complicated with delirium and pulmonary oedema.   Echo at 11/3/24 with LV systolic function with mild to moderate decreased with EF of 40% with regional wall abnormalities present, with patient at the time maintained on valproic acid for mood stabilization (not for seizures) with Seroquel stopped at the time and reduction of Trazodone to 50 mg from 100 mg, with patient on Namenda.  Patient was seen by Palliative Care at [place] in Aug 2024 wit patient DNR/ Do Not Intubate per note but MOLST obtained from [facility] from [place] unable to ascertain selected option on form signed by the patient's daughter, Mathew Brennan.  I left a general message with the daughter by voice mail with my name and office number only.  Unable to obtain history from patient.    Patient is oriented to self/hospital (?) with patient receiving haloperidol earlier in the ER.  Patient presently calm and offers no complaint.  Ucx 12/4 with GNR   11/4 CTH L frontal crani   12/4 CTH: s/p L crani. mixed SDH up to 1.7cm   12//4 repeat CTH no SDH changes.  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 make sure QTC < 500   - MRI brain and eeg only if mental status doesn't improve  - B12, RPR, TSH if not already checked   - neurosx recs appreciated ; no intervention   - when cleared by neurosx, should be on asa 81mg daily for h/o stroke.    - should be on statin therapy for secondary stroke prevention    - telemetry  - PT/OT/SS/SLP, OOBC  - check FS, glucose control <180  - GI/DVT ppx  - Counseling on diet, exercise, and medication adherence was done  - Counseling on smoking cessation and alcohol consumption offered when appropriate.  - Pain assessed and judicious use of narcotics when appropriate was discussed.    - Stroke education given when appropriate.  - Importance of fall prevention discussed.   - Differential diagnosis and plan of care discussed with patient and/or family and primary team  - Thank you for allowing me to participate in the care of this patient. Call with questions.   - d/c planning Assistled living/hospice   Isaiah [facility] MD  Vascular Neurology  Office: [phone]

## 2024-12-09 NOTE — CONSULT NOTE ADULT - SUBJECTIVE AND OBJECTIVE BOX
Neurology Consult    Reason for Consult: Patient is a 89y old  Male who presents with a chief complaint of Transferred from the [place] ED to [facility] for subdural haematoma (08 Dec 2024 14:16)      HPI:  89 y/o M--patient with moderate to severe cognitive impairment, history of essential HTN, with prior hospitalization in Aug 2024 for UTI/pyelo with apparent LEFT hydroureteronephrosis and S/P emergent cystoscopy and LEFT ureteral stent placement with patient S/P LEFT craniotomy and subdural haematoma evacuation 11/2/24 at [facility] following initial confusional state, with course complicated with delirium and pulmonary oedema.   Echo at 11/3/24 with LV systolic function with mild to moderate decreased with EF of 40% with regional wall abnormalities present, with patient at the time maintained on valproic acid for mood stabilization (not for seizures) with Seroquel stopped at the time and reduction of Trazodone to 50 mg from 100 mg, with patient on Namenda.  Patient was seen by Palliative Care at [place] in Aug 2024 wit patient DNR/ Do Not Intubate per note but MOLST obtained from [facility] from [place] unable to ascertain selected option on form signed by the patient's daughter, Mathew Brennan.  I left a general message with the daughter by voice mail with my name and office number only.  Unable to obtain history from patient.    Patient is oriented to self/hospital (?) with patient receiving haloperidol earlier in the ER.  Patient presently calm and offers no complaint.    Patient with report of increased confusion at [facility] at [place]--was transferred following [facility] at [facility]. (05 Dec 2024 02:11)       PAST MEDICAL & SURGICAL HISTORY:  HLD (hyperlipidemia)      HTN (hypertension)      PVD (peripheral vascular disease)      CAD (coronary artery disease)      Atherosclerosis      Dementia      S/P subdural hematoma evacuation          Allergies: Allergies    No Known Allergies    Intolerances        Social History: Denies toxic habits including tobacco, ETOH or illicit drugs.    Family History: FAMILY HISTORY:  Family history of premature CAD (Father)    . No family history of strokes    Medications: MEDICATIONS  (STANDING):  atorvastatin 10 milliGRAM(s) Oral at bedtime  dextrose 5%. 1000 milliLiter(s) (100 mL/Hr) IV Continuous <Continuous>  dextrose 5%. 1000 milliLiter(s) (50 mL/Hr) IV Continuous <Continuous>  dextrose 5%. 1000 milliLiter(s) (50 mL/Hr) IV Continuous <Continuous>  dextrose 50% Injectable 25 Gram(s) IV Push once  dextrose 50% Injectable 12.5 Gram(s) IV Push once  dextrose 50% Injectable 25 Gram(s) IV Push once  divalproex Sprinkle 250 milliGRAM(s) Oral every 8 hours  doxazosin 2 milliGRAM(s) Oral at bedtime  glucagon  Injectable 1 milliGRAM(s) IntraMuscular once  lactobacillus acidophilus 1 Tablet(s) Oral daily  memantine 5 milliGRAM(s) Oral daily  metoprolol succinate ER 25 milliGRAM(s) Oral daily  QUEtiapine 12.5 milliGRAM(s) Oral daily  sodium chloride 0.45%. 1000 milliLiter(s) (60 mL/Hr) IV Continuous <Continuous>  traZODone 50 milliGRAM(s) Oral at bedtime    MEDICATIONS  (PRN):  ALPRAZolam 0.25 milliGRAM(s) Oral two times a day PRN for anxiety  dextrose Oral Gel 15 Gram(s) Oral once PRN Blood Glucose LESS THAN 70 milliGRAM(s)/deciliter      Review of Systems:   unable given mental status     Vitals:  Vital Signs Last 24 Hrs  T(C): 36.3 (09 Dec 2024 08:36), Max: 36.8 (08 Dec 2024 16:02)  T(F): 97.4 (09 Dec 2024 08:36), Max: 98.3 (08 Dec 2024 16:02)  HR: 115 (09 Dec 2024 08:36) (99 - 115)  BP: 134/90 (09 Dec 2024 08:36) (96/59 - 138/89)  BP(mean): --  RR: 18 (09 Dec 2024 08:36) (18 - 18)  SpO2: 100% (09 Dec 2024 08:36) (95% - 100%)    Parameters below as of 09 Dec 2024 08:36  Patient On (Oxygen Delivery Method): room air        General Exam:   General Appearance: Appropriately dressed and in no acute distress       Head: Normocephalic, atraumatic and no dysmorphic features  Ear, Nose, and Throat: Moist mucous membranes  CVS: S1S2+  Resp: No SOB, no wheeze or rhonchi  GI: soft NT/ND  Extremities: No edema or cyanosis  Skin: No bruises or rashes     Neurological Exam:  Mental Status: Awake, alert and oriented x 1.  Able to follow some simpl verbal commands able to name some things.  states name.    Cranial Nerves: PERRL, EOMI, VFFC, sensation V1-V3 intact, L ptosis n9oted , equal elevation of palate, scm/trap 5/5, tongue is midline on protrusion. hearing is grossly intact.   Motor: MCCABE at least 3/5 but weak throughout   Sensation: Intact to light touch and pinprick throughout.   Coordination:  unable   Gait: unable     Data/Labs/Imaging which I personally reviewed.     Labs:       12-08    149[H]  |  113[H]  |  50[H]  ----------------------------<  79  3.9   |  21[L]  |  0.47[L]    Ca    9.0      08 Dec 2024 07:21          Urinalysis Basic - ( 08 Dec 2024 07:21 )    Color: x / Appearance: x / SG: x / pH: x  Gluc: 79 mg/dL / Ketone: x  / Bili: x / Urobili: x   Blood: x / Protein: x / Nitrite: x   Leuk Esterase: x / RBC: x / WBC x   Sq Epi: x / Non Sq Epi: x / Bacteria: x    < from: CT Head No Cont (11.04.24 @ 08:59) >    ACC: [id number] EXAM:  CT BRAIN   ORDERED BY: KUSUM PARRISH     PROCEDURE DATE:  11/04/2024          INTERPRETATION:  CLINICAL INFORMATION: interval scan      5mm axial sections of the brain were obtained from base to vertex,   without the intravenous administration of contrast material. Images were   obtained on a portable CT scanner and compared with 11/3/2024.      There has been a left frontal craniotomy. There is air in the subgaleal   space. There is residual mixed density subdural collection with air   unchanged since the prior exam. There is residual mass effect on the   cerebral cortical sulci and the left lateral ventricle without   significant midline shift to the right. Previous bilateral lens   replacement surgery.    IMPRESSION: No change since 11/3/2024. Left frontal craniotomy. Residual   mixed density subdural collection and air.    --- End of Report ---            NORMA NOONAN MD; Attending Radiologist  This document has been electronically signed. Nov 4 2024  9:18AM    < end of copied text >    < from: CT Head No Cont (12.04.24 @ 15:07) >    ACC: [id number] EXAM:  CT BRAIN   ORDERED BY: YOSEPH MOORE     PROCEDURE DATE:  12/04/2024          INTERPRETATION:  Exam Date: 12/4/2024 3:07 PM    CT head without IV contrast    CLINICAL INFORMATION: AMS    TECHNIQUE: Contiguous axial sections were obtained through the head.     Coronal and sagittal reformats were obtained.    COMPARISON: CT head 11/4/2024    FINDINGS:  Status post left parietal craniotomy. There is an underlying mixed   attenuation subdural hematoma overlying the left frontal convexity   measuring up to 1.7 cm. No significant mass effect on the underlying   parenchyma.    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 Scattered   calcifications of the intracranial arteries. No hydrocephalus. No acute  infarct. There is periventricular and subcortical white matter   hypoattenuation,  most compatible with chronic microvascular ischemic   changes.    The visualized portions of the paranasal sinuses and mastoid air cells   are clear.    IMPRESSION:    Status post left parietal craniotomy. There is an underlying mixed   attenuation subdural hematoma overlying the left frontal convexity   measuring up to 1.7 cm. No significant mass effect on the underlying   parenchyma.    Chronic changes as above.      Critical value:  I discussed the finding of this report with the provider   Renay at 3:18 PM on 12/4/2024.  Critical value policy of the hospital   was followed.  Read back and confirmation of receipt of this   communication was performed.This verbal communication supplements the   text report of this document.          --- End of Report ---            CURT HODGE MD; Attending Radiologist  This document has been electronically signed. Dec  4 2024  3:21PM    < end of copied text >

## 2024-12-09 NOTE — CONSULT NOTE ADULT - CONSULT REQUESTED DATE/TIME
04-Dec-2024 17:30
06-Dec-2024 13:14
09-Dec-2024 09:50
05-Dec-2024 11:47
05-Dec-2024 12:56
05-Dec-2024 09:51

## 2024-12-10 VITALS
DIASTOLIC BLOOD PRESSURE: 75 MMHG | TEMPERATURE: 97 F | OXYGEN SATURATION: 97 % | HEART RATE: 58 BPM | SYSTOLIC BLOOD PRESSURE: 133 MMHG | RESPIRATION RATE: 18 BRPM

## 2024-12-10 PROCEDURE — 82306 VITAMIN D 25 HYDROXY: CPT

## 2024-12-10 PROCEDURE — 80048 BASIC METABOLIC PNL TOTAL CA: CPT

## 2024-12-10 PROCEDURE — 84439 ASSAY OF FREE THYROXINE: CPT

## 2024-12-10 PROCEDURE — 85025 COMPLETE CBC W/AUTO DIFF WBC: CPT

## 2024-12-10 PROCEDURE — 84443 ASSAY THYROID STIM HORMONE: CPT

## 2024-12-10 PROCEDURE — 70450 CT HEAD/BRAIN W/O DYE: CPT | Mod: MC

## 2024-12-10 PROCEDURE — 85027 COMPLETE CBC AUTOMATED: CPT

## 2024-12-10 PROCEDURE — 83036 HEMOGLOBIN GLYCOSYLATED A1C: CPT

## 2024-12-10 PROCEDURE — 93005 ELECTROCARDIOGRAM TRACING: CPT

## 2024-12-10 PROCEDURE — 82607 VITAMIN B-12: CPT

## 2024-12-10 PROCEDURE — 97161 PT EVAL LOW COMPLEX 20 MIN: CPT

## 2024-12-10 PROCEDURE — 82962 GLUCOSE BLOOD TEST: CPT

## 2024-12-10 PROCEDURE — 82746 ASSAY OF FOLIC ACID SERUM: CPT

## 2024-12-10 PROCEDURE — 96374 THER/PROPH/DIAG INJ IV PUSH: CPT

## 2024-12-10 PROCEDURE — 99285 EMERGENCY DEPT VISIT HI MDM: CPT

## 2024-12-10 RX ADMIN — Medication 1 TABLET(S): at 13:07

## 2024-12-10 RX ADMIN — Medication 12.5 MILLIGRAM(S): at 13:07

## 2024-12-10 RX ADMIN — METOPROLOL TARTRATE 25 MILLIGRAM(S): 100 TABLET, FILM COATED ORAL at 06:06

## 2024-12-10 RX ADMIN — Medication 250 MILLIGRAM(S): at 06:06

## 2024-12-10 RX ADMIN — Medication 250 MILLIGRAM(S): at 13:07

## 2024-12-10 RX ADMIN — MEMANTINE HYDROCHLORIDE 5 MILLIGRAM(S): 14 CAPSULE, EXTENDED RELEASE ORAL at 13:07

## 2024-12-10 NOTE — PROGRESS NOTE ADULT - PROVIDER SPECIALTY LIST ADULT
Cardiology
Hospitalist
Hospitalist
Neurology
Hospitalist
Hospitalist
Podiatry
Cardiology

## 2024-12-10 NOTE — PROGRESS NOTE ADULT - REASON FOR ADMISSION
Transferred from the [place] ED to [place] for subdural haematoma
Transferred from the [place] ED to [place] for subdural haematoma
Transferred from the [place] ED to [place] for subdural haematoma
Transferred from the [place] [facility] to [place] for subdural haematoma
Transferred from the [place] ED to [place] for subdural haematoma
Transferred from the [place] ED to [place] for subdural haematoma
Transferred from the [place] ED to [place] for subdural haematoma
Transferred from the [place] ED to [place] for subdural haematoma
Transferred from the [place] ED to [place] for subdural haematoma
Transferred from the [place] ED to [place] for subdural haematoma
Transferred from the [place] ED to [place] for subdural haematoma

## 2024-12-10 NOTE — PROGRESS NOTE ADULT - PROBLEM SELECTOR PLAN 4
aspiration and fall precautions   IV abx   gentle IVF.
aspiration and fall precautions   IV abx   gentle IVF.
aspiration and fall precautions   completed IV abx   gentle IVF.
aspiration and fall precautions   IV abx   gentle IVF.
aspiration and fall precautions   IV abx   gentle IVF.

## 2024-12-10 NOTE — PROGRESS NOTE ADULT - ASSESSMENT
89y (24-Jan-1926) man with a PMHx significant for moderate to severe cognitive impairment, history of essential HTN, with prior hospitalization in Aug 2024 for UTI/pyelo with apparent LEFT hydroureteronephrosis and S/P emergent cystoscopy and LEFT ureteral stent placement with patient S/P LEFT craniotomy and subdural haematoma evacuation 11/2/24 at [facility] following initial confusional state, with course complicated with delirium and pulmonary oedema. Patient at the time maintained on valproic acid for mood stabilization (not for seizures) with Seroquel stopped at the time and reduction of Trazodone to 50 mg from 100 mg, with patient on Memantine. Patient admitted for worsening mental status, noted to have a stable CTH but UTI positive with Ucx positive.    AMS, SDH  - repeat CT head unchanged  - no surgical intervention as per neurosurgery  - will monitor   - cannot rule out metabolic encephalopathy sec to cystitis on top of worsening dementia  - psych fu     HTN  - cw home meds     Hypernatremia  - IVF   - poor po intake  - failed speech and swallow  - family agreed for pleasure feeds    now DNR    DC planning to assisted living with hospice 
Assessment/plan:    Right anterior shin wound: Stable, noninfected, present admission  Early signs of DTI bilateral heels.    Recommend Cavilon to heels every other day and use of Z flow boots and continue decubitus precautions while in house.  Recommend mupirocin ointment and DSD to right anterior shin wound over the day.  Reconsult podiatry as needed
89y (24-Jan-1926) man with a PMHx significant for moderate to severe cognitive impairment, history of essential HTN, with prior hospitalization in Aug 2024 for UTI/pyelo with apparent LEFT hydroureteronephrosis and S/P emergent cystoscopy and LEFT ureteral stent placement with patient S/P LEFT craniotomy and subdural haematoma evacuation 11/2/24 at [facility] following initial confusional state, with course complicated with delirium and pulmonary oedema. Patient at the time maintained on valproic acid for mood stabilization (not for seizures) with Seroquel stopped at the time and reduction of Trazodone to 50 mg from 100 mg, with patient on Memantine. Patient admitted for worsening mental status, noted to have a stable CTH but UTI positive with Ucx positive.    AMS, SDH  - repeat CT head unchanged  - no surgical intervention as per neurosurgery  - will monitor   - cannot rule out metabolic encephalopathy sec to cystitis on top of worsening dementia  - psych fu     Cystitis  - cw abx  - fu cultures    HTN  - cw home meds     PT and dc planning     
89y (24-Jan-1926) man with a PMHx significant for moderate to severe cognitive impairment, history of essential HTN, with prior hospitalization in Aug 2024 for UTI/pyelo with apparent LEFT hydroureteronephrosis and S/P emergent cystoscopy and LEFT ureteral stent placement with patient S/P LEFT craniotomy and subdural haematoma evacuation 11/2/24 at [facility] following initial confusional state, with course complicated with delirium and pulmonary oedema. Patient at the time maintained on valproic acid for mood stabilization (not for seizures) with Seroquel stopped at the time and reduction of Trazodone to 50 mg from 100 mg, with patient on Memantine. Patient admitted for worsening mental status, noted to have a stable CTH but UTI positive with Ucx positive.    AMS, SDH  - repeat CT head unchanged  - no surgical intervention as per neurosurgery  - will monitor   - cannot rule out metabolic encephalopathy sec to cystitis on top of worsening dementia  - psych fu     HTN  - cw home meds     Hypernatremia  - IVF   - poor po intake  - failed speech and swallow  - family agreed for pleasure feeds    now DNR    DC planning to assisted living with hospice in am   
89 y/o M  with moderate to severe cognitive impairment, HTN, with prior hospitalization in Aug 2024 for UTI/pyelo with apparent LEFT hydroureteronephrosis and S/P emergent cystoscopy and LEFT ureteral stent placement with patient S/P LEFT craniotomy and subdural haematoma evacuation 11/2/24 at [place] following initial confusional state, with course complicated with delirium and pulmonary oedema.   Echo at 11/3/24 with LV systolic function with mild to moderate decreased with EF of 40% with regional wall abnormalities present, with patient at the time maintained on valproic acid for mood stabilization (not for seizures) with Seroquel stopped at the time and reduction of Trazodone to 50 mg from 100 mg, with patient on Namenda.  Patient was seen by Palliative Care at [place] in Aug 2024 wit patient DNR/ Do Not Intubate per note but MOLST obtained from [facility] from [place] unable to ascertain selected option on form signed by the patient's daughter, Mathew Brennan.  I left a general message with the daughter by voice mail with my name and office number only.  Unable to obtain history from patient.    Patient is oriented to self/hospital (?) with patient receiving haloperidol earlier in the ER.  Patient presently calm and offers no complaint.  Ucx 12/4 with GNR   11/4 CTH L frontal crani   12/4 CTH: s/p L crani. mixed SDH up to 1.7cm   12//4 repeat CTH no SDH changes.  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 make sure QTC < 500   - MRI brain and eeg only if mental status doesn't improve and if agreement with GOC   - B12, RPR, TSH if not already checked   - neurosx recs appreciated ; no intervention   - when cleared by neurosx, should be on asa 81mg daily for h/o stroke.    - should be on statin therapy for secondary stroke prevention    - telemetry  - PT/OT/SS/SLP, OOBC  - check FS, glucose control <180  - GI/DVT ppx   - d/c planning Assistled living/hospice   DNRI/I no feeding tube   Isaiah Beck MD  Vascular Neurology  Office: [phone]   
89y (24-Jan-1926) man with a PMHx significant for moderate to severe cognitive impairment, history of essential HTN, with prior hospitalization in Aug 2024 for UTI/pyelo with apparent LEFT hydroureteronephrosis and S/P emergent cystoscopy and LEFT ureteral stent placement with patient S/P LEFT craniotomy and subdural haematoma evacuation 11/2/24 at [facility] following initial confusional state, with course complicated with delirium and pulmonary oedema. Patient at the time maintained on valproic acid for mood stabilization (not for seizures) with Seroquel stopped at the time and reduction of Trazodone to 50 mg from 100 mg, with patient on Memantine. Patient admitted for worsening mental status, noted to have a stable CTH but UTI positive with Ucx positive.    AMS, SDH  - repeat CT head unchanged  - no surgical intervention as per neurosurgery  - will monitor   - cannot rule out metabolic encephalopathy sec to cystitis on top of worsening dementia  - psych fu     Cystitis  - cw abx  - fu cultures    HTN  - cw home meds   - DASH diet    Dysphagia  - family refused NGT and PEG   - pleasure feeds only   
89 y/o M--patient with moderate to severe cognitive impairment, history of essential HTN, with prior hospitalization in Aug 2024 for UTI/pyelo with apparent LEFT hydroureteronephrosis and S/P emergent cystoscopy and LEFT ureteral stent placement with patient S/P LEFT craniotomy and subdural haematoma evacuation 11/2/24 at [place] following initial confusional state, with course complicated with delirium and pulmonary oedema.   Echo at 11/3/24 with LV systolic function with mild to moderate decreased with EF of 40% with regional wall abnormalities present, with patient at the time maintained on valproic acid for mood stabilization (not for seizures) with Seroquel stopped at the time and reduction of Trazodone to 50 mg from 100 mg, with patient on Namenda.  Patient was seen by Palliative Care at [place] in Aug 2024 wit patient DNR/ Do Not Intubate per note but MOLST obtained from [facility] from [place] unable to ascertain selected option on form signed by the patient's daughter, Mathew Brennan.  I left a general message with the daughter by voice mail with my name and office number only.  Unable to obtain history from patient.    Patient is oriented to self/hospital (?) with patient receiving haloperidol earlier in the ER.  Patient presently calm and offers no complaint.    Patient with report of increased confusion at [facility] at [place]--was transferred following course at [facility] following [place] discharge.  
89 y/o M--patient with moderate to severe cognitive impairment, history of essential HTN, with prior hospitalization in Aug 2024 for UTI/pyelo with apparent LEFT hydroureteronephrosis and S/P emergent cystoscopy and LEFT ureteral stent placement with patient S/P LEFT craniotomy and subdural haematoma evacuation 11/2/24 at [place] following initial confusional state, with course complicated with delirium and pulmonary oedema.   Echo at 11/3/24 with LV systolic function with mild to moderate decreased with EF of 40% with regional wall abnormalities present, with patient at the time maintained on valproic acid for mood stabilization (not for seizures) with Seroquel stopped at the time and reduction of Trazodone to 50 mg from 100 mg, with patient on Namenda.  Patient was seen by Palliative Care at [place] in Aug 2024 wit patient DNR/ Do Not Intubate per note but MOLST obtained from [facility] from [place] unable to ascertain selected option on form signed by the patient's daughter, Mathew Brennan.  I left a general message with the daughter by voice mail with my name and office number only.  Unable to obtain history from patient.    Patient is oriented to self/hospital (?) with patient receiving haloperidol earlier in the ER.  Patient presently calm and offers no complaint.    Patient with report of increased confusion at [facility] at [place]--was transferred following course at [facility] following [place] discharge.  
89 y/o M--patient with moderate to severe cognitive impairment, history of essential HTN, with prior hospitalization in Aug 2024 for UTI/pyelo with apparent LEFT hydroureteronephrosis and S/P emergent cystoscopy and LEFT ureteral stent placement with patient S/P LEFT craniotomy and subdural haematoma evacuation 11/2/24 at [place] following initial confusional state, with course complicated with delirium and pulmonary oedema.   Echo at 11/3/24 with LV systolic function with mild to moderate decreased with EF of 40% with regional wall abnormalities present, with patient at the time maintained on valproic acid for mood stabilization (not for seizures) with Seroquel stopped at the time and reduction of Trazodone to 50 mg from 100 mg, with patient on Namenda.  Patient was seen by Palliative Care at [place] in Aug 2024 wit patient DNR/ Do Not Intubate per note but MOLST obtained from [facility] from [place] unable to ascertain selected option on form signed by the patient's daughter, Mathew Brennan.  I left a general message with the daughter by voice mail with my name and office number only.  Unable to obtain history from patient.    Patient is oriented to self/hospital (?) with patient receiving haloperidol earlier in the ER.  Patient presently calm and offers no complaint.    Patient with report of increased confusion at [facility] at [place]--was transferred following course at [facility] following [place] discharge.  
89 y/o M--patient with moderate to severe cognitive impairment, history of essential HTN, with prior hospitalization in Aug 2024 for UTI/pyelo with apparent LEFT hydroureteronephrosis and S/P emergent cystoscopy and LEFT ureteral stent placement with patient S/P LEFT craniotomy and subdural haematoma evacuation 11/2/24 at [place] following initial confusional state, with course complicated with delirium and pulmonary oedema.   Echo at 11/3/24 with LV systolic function with mild to moderate decreased with EF of 40% with regional wall abnormalities present, with patient at the time maintained on valproic acid for mood stabilization (not for seizures) with Seroquel stopped at the time and reduction of Trazodone to 50 mg from 100 mg, with patient on Namenda.  Patient was seen by Palliative Care at [place] in Aug 2024 wit patient DNR/ Do Not Intubate per note but MOLST obtained from [facility] from [place] unable to ascertain selected option on form signed by the patient's daughter, Mathew Brennan.  I left a general message with the daughter by voice mail with my name and office number only.  Unable to obtain history from patient.    Patient is oriented to self/hospital (?) with patient receiving haloperidol earlier in the ER.  Patient presently calm and offers no complaint.    Patient with report of increased confusion at [facility] at [place]--was transferred following course at [facility] following [place] discharge.  
89 y/o M--patient with moderate to severe cognitive impairment, history of essential HTN, with prior hospitalization in Aug 2024 for UTI/pyelo with apparent LEFT hydroureteronephrosis and S/P emergent cystoscopy and LEFT ureteral stent placement with patient S/P LEFT craniotomy and subdural haematoma evacuation 11/2/24 at [place] following initial confusional state, with course complicated with delirium and pulmonary oedema.   Echo at 11/3/24 with LV systolic function with mild to moderate decreased with EF of 40% with regional wall abnormalities present, with patient at the time maintained on valproic acid for mood stabilization (not for seizures) with Seroquel stopped at the time and reduction of Trazodone to 50 mg from 100 mg, with patient on Namenda.  Patient was seen by Palliative Care at [place] in Aug 2024 wit patient DNR/ Do Not Intubate per note but MOLST obtained from [facility] from [place] unable to ascertain selected option on form signed by the patient's daughter, Mathew Brennan.  I left a general message with the daughter by voice mail with my name and office number only.  Unable to obtain history from patient.    Patient is oriented to self/hospital (?) with patient receiving haloperidol earlier in the ER.  Patient presently calm and offers no complaint.    Patient with report of increased confusion at [facility] at [place]--was transferred following course at [facility] following [place] discharge.

## 2024-12-10 NOTE — PROGRESS NOTE ADULT - SUBJECTIVE AND OBJECTIVE BOX
Patient is a 89y old  Male who presents with a chief complaint of Transferred from the [facility] to [place] for subdural haematoma (06 Dec 2024 15:22)    Date of servie : 12-07-24 @ 12:33  INTERVAL HPI/OVERNIGHT EVENTS:  T(C): 36.6 (12-07-24 @ 11:23), Max: 36.8 (12-07-24 @ 04:27)  HR: 77 (12-07-24 @ 11:23) (72 - 98)  BP: 149/86 (12-07-24 @ 11:23) (136/88 - 188/91)  RR: 18 (12-07-24 @ 11:23) (18 - 18)  SpO2: 99% (12-07-24 @ 11:23) (93% - 100%)  Wt(kg): --  I&O's Summary      LABS:                        11.3   4.59  )-----------( 190      ( 07 Dec 2024 07:09 )             34.7     12-07    149[H]  |  112[H]  |  55[H]  ----------------------------<  80  4.2   |  22  |  1.07    Ca    9.3      07 Dec 2024 07:09        Urinalysis Basic - ( 07 Dec 2024 07:09 )    Color: x / Appearance: x / SG: x / pH: x  Gluc: 80 mg/dL / Ketone: x  / Bili: x / Urobili: x   Blood: x / Protein: x / Nitrite: x   Leuk Esterase: x / RBC: x / WBC x   Sq Epi: x / Non Sq Epi: x / Bacteria: x      CAPILLARY BLOOD GLUCOSE      POCT Blood Glucose.: 79 mg/dL (07 Dec 2024 12:18)  POCT Blood Glucose.: 86 mg/dL (07 Dec 2024 07:45)  POCT Blood Glucose.: 91 mg/dL (06 Dec 2024 18:01)        Urinalysis Basic - ( 07 Dec 2024 07:09 )    Color: x / Appearance: x / SG: x / pH: x  Gluc: 80 mg/dL / Ketone: x  / Bili: x / Urobili: x   Blood: x / Protein: x / Nitrite: x   Leuk Esterase: x / RBC: x / WBC x   Sq Epi: x / Non Sq Epi: x / Bacteria: x        MEDICATIONS  (STANDING):  atorvastatin 10 milliGRAM(s) Oral at bedtime  dextrose 5%. 1000 milliLiter(s) (100 mL/Hr) IV Continuous <Continuous>  dextrose 5%. 1000 milliLiter(s) (50 mL/Hr) IV Continuous <Continuous>  dextrose 5%. 1000 milliLiter(s) (50 mL/Hr) IV Continuous <Continuous>  dextrose 50% Injectable 25 Gram(s) IV Push once  dextrose 50% Injectable 12.5 Gram(s) IV Push once  dextrose 50% Injectable 25 Gram(s) IV Push once  doxazosin 2 milliGRAM(s) Oral at bedtime  glucagon  Injectable 1 milliGRAM(s) IntraMuscular once  lactobacillus acidophilus 1 Tablet(s) Oral daily  memantine 5 milliGRAM(s) Oral daily  metoprolol succinate ER 25 milliGRAM(s) Oral daily  QUEtiapine 12.5 milliGRAM(s) Oral daily  traZODone 50 milliGRAM(s) Oral at bedtime  valproic acid 250 milliGRAM(s) Oral every 8 hours    MEDICATIONS  (PRN):  ALPRAZolam 0.25 milliGRAM(s) Oral two times a day PRN for anxiety  dextrose Oral Gel 15 Gram(s) Oral once PRN Blood Glucose LESS THAN 70 milliGRAM(s)/deciliter          PHYSICAL EXAM:  GENERAL: frail  CHEST/LUNG: Clear to percussion bilaterally; No rales, rhonchi, wheezing, or rubs  HEART: Regular rate and rhythm; No murmurs, rubs, or gallops  ABDOMEN: Soft, Nontender, Nondistended; Bowel sounds present  EXTREMITIES: edema +    Care Discussed with Consultants/Other Providers [ ] YES  [ ] NO
Subjective: Patient seen and examined. No new events except as noted.     REVIEW OF SYSTEMS:    CONSTITUTIONAL: +weakness, fevers or chills  EYES/ENT: No visual changes;  No vertigo or throat pain   NECK: No pain or stiffness  RESPIRATORY: No cough, wheezing, hemoptysis; No shortness of breath  CARDIOVASCULAR: No chest pain or palpitations  GASTROINTESTINAL: No abdominal or epigastric pain. No nausea, vomiting, or hematemesis; No diarrhea or constipation. No melena or hematochezia.  GENITOURINARY: No dysuria, frequency or hematuria  NEUROLOGICAL: No numbness or weakness  SKIN: No itching, burning, rashes, or lesions   All other review of systems is negative unless indicated above.    MEDICATIONS:  MEDICATIONS  (STANDING):  atorvastatin 10 milliGRAM(s) Oral at bedtime  dextrose 5%. 1000 milliLiter(s) (100 mL/Hr) IV Continuous <Continuous>  dextrose 5%. 1000 milliLiter(s) (50 mL/Hr) IV Continuous <Continuous>  dextrose 5%. 1000 milliLiter(s) (50 mL/Hr) IV Continuous <Continuous>  dextrose 50% Injectable 25 Gram(s) IV Push once  dextrose 50% Injectable 12.5 Gram(s) IV Push once  dextrose 50% Injectable 25 Gram(s) IV Push once  divalproex Sprinkle 250 milliGRAM(s) Oral every 8 hours  doxazosin 2 milliGRAM(s) Oral at bedtime  glucagon  Injectable 1 milliGRAM(s) IntraMuscular once  lactobacillus acidophilus 1 Tablet(s) Oral daily  memantine 5 milliGRAM(s) Oral daily  metoprolol succinate ER 25 milliGRAM(s) Oral daily  QUEtiapine 12.5 milliGRAM(s) Oral daily  sodium chloride 0.45%. 1000 milliLiter(s) (60 mL/Hr) IV Continuous <Continuous>  traZODone 50 milliGRAM(s) Oral at bedtime      PHYSICAL EXAM:  T(C): 36.5 (12-10-24 @ 17:17), Max: 36.7 (12-10-24 @ 00:11)  HR: 58 (12-10-24 @ 17:17) (58 - 105)  BP: 132/59 (12-10-24 @ 17:17) (117/60 - 147/80)  RR: 18 (12-10-24 @ 17:17) (18 - 18)  SpO2: 97% (12-10-24 @ 17:17) (96% - 100%)  Wt(kg): --  I&O's Summary          Appearance: NAD   HEENT:   Normal oral mucosa, PERRL, EOMI	  Lymphatic: No lymphadenopathy  Cardiovascular: Normal S1 S2, No JVD, No murmurs, No edema  Respiratory: decreased bs  Psychiatry: A & O x 1  Gastrointestinal:  Soft, Non-tender, + BS	  Skin: No rashes, No ecchymoses, No cyanosis	  Neurologic: Awake, confused, oriented to self, PERRL, EOMI, no facial, FC, frail, BUE 4+/5, no drift, BLE 4+/5    Extremities: Normal range of motion, No clubbing, cyanosis or edema  Vascular: Peripheral pulses palpable 2+ bilaterally        LABS:    CARDIAC MARKERS:            TELEMETRY: 	    ECG:  	  RADIOLOGY:   DIAGNOSTIC TESTING:  [ ] Echocardiogram:  [ ]  Catheterization:  [ ] Stress Test:    OTHER: 	          
Patient is a 89y old  Male who presents with a chief complaint of Transferred from the [place] ED to [place] for subdural haematoma (09 Dec 2024 09:50)    Date of servie : 12-09-24 @ 13:32  INTERVAL HPI/OVERNIGHT EVENTS:  T(C): 36.3 (12-09-24 @ 12:37), Max: 36.8 (12-08-24 @ 16:02)  HR: 86 (12-09-24 @ 12:37) (86 - 115)  BP: 125/79 (12-09-24 @ 12:37) (96/59 - 137/84)  RR: 18 (12-09-24 @ 12:37) (18 - 18)  SpO2: 99% (12-09-24 @ 12:37) (95% - 100%)  Wt(kg): --  I&O's Summary      LABS:    12-08    149[H]  |  113[H]  |  50[H]  ----------------------------<  79  3.9   |  21[L]  |  0.47[L]    Ca    9.0      08 Dec 2024 07:21        Urinalysis Basic - ( 08 Dec 2024 07:21 )    Color: x / Appearance: x / SG: x / pH: x  Gluc: 79 mg/dL / Ketone: x  / Bili: x / Urobili: x   Blood: x / Protein: x / Nitrite: x   Leuk Esterase: x / RBC: x / WBC x   Sq Epi: x / Non Sq Epi: x / Bacteria: x      CAPILLARY BLOOD GLUCOSE            Urinalysis Basic - ( 08 Dec 2024 07:21 )    Color: x / Appearance: x / SG: x / pH: x  Gluc: 79 mg/dL / Ketone: x  / Bili: x / Urobili: x   Blood: x / Protein: x / Nitrite: x   Leuk Esterase: x / RBC: x / WBC x   Sq Epi: x / Non Sq Epi: x / Bacteria: x        MEDICATIONS  (STANDING):  atorvastatin 10 milliGRAM(s) Oral at bedtime  dextrose 5%. 1000 milliLiter(s) (100 mL/Hr) IV Continuous <Continuous>  dextrose 5%. 1000 milliLiter(s) (50 mL/Hr) IV Continuous <Continuous>  dextrose 5%. 1000 milliLiter(s) (50 mL/Hr) IV Continuous <Continuous>  dextrose 50% Injectable 25 Gram(s) IV Push once  dextrose 50% Injectable 12.5 Gram(s) IV Push once  dextrose 50% Injectable 25 Gram(s) IV Push once  divalproex Sprinkle 250 milliGRAM(s) Oral every 8 hours  doxazosin 2 milliGRAM(s) Oral at bedtime  glucagon  Injectable 1 milliGRAM(s) IntraMuscular once  lactobacillus acidophilus 1 Tablet(s) Oral daily  memantine 5 milliGRAM(s) Oral daily  metoprolol succinate ER 25 milliGRAM(s) Oral daily  QUEtiapine 12.5 milliGRAM(s) Oral daily  sodium chloride 0.45%. 1000 milliLiter(s) (60 mL/Hr) IV Continuous <Continuous>  traZODone 50 milliGRAM(s) Oral at bedtime    MEDICATIONS  (PRN):  ALPRAZolam 0.25 milliGRAM(s) Oral two times a day PRN for anxiety  dextrose Oral Gel 15 Gram(s) Oral once PRN Blood Glucose LESS THAN 70 milliGRAM(s)/deciliter          PHYSICAL EXAM:  GENERAL: frail, sleepy, less restless   CHEST/LUNG: Clear to percussion bilaterally; No rales, rhonchi, wheezing, or rubs  HEART: S1S2+  ABDOMEN: Soft, Nontender, Nondistended; Bowel sounds present  EXTREMITIES: edema +    Care Discussed with Consultants/Other Providers [ x] YES  [ ] NO
Subjective: Patient seen and examined. No new events except as noted.     REVIEW OF SYSTEMS:    CONSTITUTIONAL: + weakness, fevers or chills  EYES/ENT: No visual changes;  No vertigo or throat pain   NECK: No pain or stiffness  RESPIRATORY: No cough, wheezing, hemoptysis; No shortness of breath  CARDIOVASCULAR: No chest pain or palpitations  GASTROINTESTINAL: No abdominal or epigastric pain. No nausea, vomiting, or hematemesis; No diarrhea or constipation. No melena or hematochezia.  GENITOURINARY: No dysuria, frequency or hematuria  NEUROLOGICAL: No numbness or weakness  SKIN: No itching, burning, rashes, or lesions   All other review of systems is negative unless indicated above.    MEDICATIONS:  MEDICATIONS  (STANDING):  atorvastatin 10 milliGRAM(s) Oral at bedtime  dextrose 5%. 1000 milliLiter(s) (100 mL/Hr) IV Continuous <Continuous>  dextrose 5%. 1000 milliLiter(s) (50 mL/Hr) IV Continuous <Continuous>  dextrose 5%. 1000 milliLiter(s) (50 mL/Hr) IV Continuous <Continuous>  dextrose 50% Injectable 25 Gram(s) IV Push once  dextrose 50% Injectable 12.5 Gram(s) IV Push once  dextrose 50% Injectable 25 Gram(s) IV Push once  divalproex Sprinkle 250 milliGRAM(s) Oral every 8 hours  doxazosin 2 milliGRAM(s) Oral at bedtime  glucagon  Injectable 1 milliGRAM(s) IntraMuscular once  lactobacillus acidophilus 1 Tablet(s) Oral daily  memantine 5 milliGRAM(s) Oral daily  metoprolol succinate ER 25 milliGRAM(s) Oral daily  QUEtiapine 12.5 milliGRAM(s) Oral daily  traZODone 50 milliGRAM(s) Oral at bedtime      PHYSICAL EXAM:  T(C): 36.5 (12-08-24 @ 08:30), Max: 36.8 (12-08-24 @ 00:13)  HR: 74 (12-08-24 @ 08:30) (72 - 87)  BP: 181/92 (12-08-24 @ 08:30) (103/78 - 181/92)  RR: 19 (12-08-24 @ 08:30) (18 - 19)  SpO2: 96% (12-08-24 @ 08:30) (93% - 99%)  Wt(kg): --  I&O's Summary          Appearance: NAD   HEENT:   Normal oral mucosa, PERRL, EOMI	  Lymphatic: No lymphadenopathy  Cardiovascular: Normal S1 S2, No JVD, No murmurs, No edema  Respiratory: decreased bs  Psychiatry: A & O x 1  Gastrointestinal:  Soft, Non-tender, + BS	  Skin: No rashes, No ecchymoses, No cyanosis	  Neurologic: Awake, confused, oriented to self, PERRL, EOMI, no facial, FC, frail, BUE 4+/5, no drift, BLE 4+/5    Extremities: Normal range of motion, No clubbing, cyanosis or edema  Vascular: Peripheral pulses palpable 2+ bilaterally        LABS:    CARDIAC MARKERS:                                11.3   4.59  )-----------( 190      ( 07 Dec 2024 07:09 )             34.7     12-08    149[H]  |  113[H]  |  50[H]  ----------------------------<  79  3.9   |  21[L]  |  0.47[L]    Ca    9.0      08 Dec 2024 07:21        TELEMETRY: 	    ECG:  	  RADIOLOGY:   DIAGNOSTIC TESTING:  [ ] Echocardiogram:  [ ]  Catheterization:  [ ] Stress Test:    OTHER: 	          
Subjective: Patient seen and examined. No new events except as noted.     REVIEW OF SYSTEMS:  Unable to obtain       MEDICATIONS:  MEDICATIONS  (STANDING):  cefTRIAXone   IVPB 1000 milliGRAM(s) IV Intermittent every 24 hours  dextrose 5%. 1000 milliLiter(s) (100 mL/Hr) IV Continuous <Continuous>  dextrose 5%. 1000 milliLiter(s) (50 mL/Hr) IV Continuous <Continuous>  dextrose 5%. 1000 milliLiter(s) (50 mL/Hr) IV Continuous <Continuous>  dextrose 50% Injectable 25 Gram(s) IV Push once  dextrose 50% Injectable 12.5 Gram(s) IV Push once  dextrose 50% Injectable 25 Gram(s) IV Push once  doxazosin 2 milliGRAM(s) Oral at bedtime  glucagon  Injectable 1 milliGRAM(s) IntraMuscular once  lactobacillus acidophilus 1 Tablet(s) Oral daily  metoprolol succinate ER 25 milliGRAM(s) Oral daily  valproic acid 250 milliGRAM(s) Oral every 8 hours      PHYSICAL EXAM:  T(C): 36.4 (12-06-24 @ 08:04), Max: 36.6 (12-06-24 @ 00:16)  HR: 85 (12-06-24 @ 08:04) (78 - 95)  BP: 166/86 (12-06-24 @ 08:04) (124/82 - 166/86)  RR: 18 (12-06-24 @ 08:04) (18 - 18)  SpO2: 96% (12-06-24 @ 08:04) (94% - 98%)  Wt(kg): --  I&O's Summary          Appearance: NAD   HEENT:   Normal oral mucosa, PERRL, EOMI	  Lymphatic: No lymphadenopathy  Cardiovascular: Normal S1 S2, No JVD, No murmurs, No edema  Respiratory: decreased bs  Psychiatry: A & O x 1  Gastrointestinal:  Soft, Non-tender, + BS	  Skin: No rashes, No ecchymoses, No cyanosis	  Neurologic: Awake, confused, oriented to self, PERRL, EOMI, no facial, FC, frail, BUE 4+/5, no drift, BLE 4+/5    Extremities: Normal range of motion, No clubbing, cyanosis or edema  Vascular: Peripheral pulses palpable 2+ bilaterally      LABS:    CARDIAC MARKERS:                                10.6   6.40  )-----------( 168      ( 06 Dec 2024 07:08 )             32.9     12-05    143  |  109[H]  |  41[H]  ----------------------------<  72  4.4   |  18[L]  |  0.85    Ca    8.7      05 Dec 2024 07:05    TPro  7.0  /  Alb  3.1[L]  /  TBili  0.5  /  DBili  x   /  AST  37  /  ALT  33  /  AlkPhos  86  12-04            TELEMETRY: 	    ECG:  	  RADIOLOGY:   DIAGNOSTIC TESTING:  [ ] Echocardiogram:  [ ]  Catheterization:  [ ] Stress Test:    OTHER: 	          
Neurology      S: patient seen. no neuro changes       Medications: MEDICATIONS  (STANDING):  atorvastatin 10 milliGRAM(s) Oral at bedtime  dextrose 5%. 1000 milliLiter(s) (100 mL/Hr) IV Continuous <Continuous>  dextrose 5%. 1000 milliLiter(s) (50 mL/Hr) IV Continuous <Continuous>  dextrose 5%. 1000 milliLiter(s) (50 mL/Hr) IV Continuous <Continuous>  dextrose 50% Injectable 25 Gram(s) IV Push once  dextrose 50% Injectable 12.5 Gram(s) IV Push once  dextrose 50% Injectable 25 Gram(s) IV Push once  divalproex Sprinkle 250 milliGRAM(s) Oral every 8 hours  doxazosin 2 milliGRAM(s) Oral at bedtime  glucagon  Injectable 1 milliGRAM(s) IntraMuscular once  lactobacillus acidophilus 1 Tablet(s) Oral daily  memantine 5 milliGRAM(s) Oral daily  metoprolol succinate ER 25 milliGRAM(s) Oral daily  QUEtiapine 12.5 milliGRAM(s) Oral daily  sodium chloride 0.45%. 1000 milliLiter(s) (60 mL/Hr) IV Continuous <Continuous>  traZODone 50 milliGRAM(s) Oral at bedtime    MEDICATIONS  (PRN):  ALPRAZolam 0.25 milliGRAM(s) Oral two times a day PRN for anxiety  dextrose Oral Gel 15 Gram(s) Oral once PRN Blood Glucose LESS THAN 70 milliGRAM(s)/deciliter       Vitals:  Vital Signs Last 24 Hrs  T(C): 36.3 (10 Dec 2024 06:08), Max: 36.7 (10 Dec 2024 00:11)  T(F): 97.3 (10 Dec 2024 06:08), Max: 98 (10 Dec 2024 00:11)  HR: 98 (10 Dec 2024 06:08) (86 - 105)  BP: 117/60 (10 Dec 2024 06:08) (117/60 - 147/80)  BP(mean): --  RR: 18 (10 Dec 2024 06:08) (18 - 19)  SpO2: 97% (10 Dec 2024 06:08) (96% - 100%)    Parameters below as of 10 Dec 2024 06:08  Patient On (Oxygen Delivery Method): room air            General Exam:   General Appearance: Appropriately dressed and in no acute distress       Head: Normocephalic, atraumatic and no dysmorphic features  Ear, Nose, and Throat: Moist mucous membranes  CVS: S1S2+  Resp: No SOB, no wheeze or rhonchi  GI: soft NT/ND  Extremities: No edema or cyanosis  Skin: No bruises or rashes     Neurological Exam:  Mental Status: Awake, alert and oriented x 1.  Able to follow some simpl verbal commands able to name some things.  states name.    Cranial Nerves: PERRL, EOMI, VFFC, sensation V1-V3 intact, L ptosis n9oted , equal elevation of palate, scm/trap 5/5, tongue is midline on protrusion. hearing is grossly intact.   Motor: MCCABE at least 3/5 but weak throughout   Sensation: Intact to light touch and pinprick throughout.   Coordination:  unable   Gait: unable     Data/Labs/Imaging which I personally reviewed.       LABS: no new labs           < from: CT Head No Cont (11.04.24 @ 08:59) >    ACC: [id number] EXAM:  CT BRAIN   ORDERED BY: KUSUM PARRISH     PROCEDURE DATE:  11/04/2024          INTERPRETATION:  CLINICAL INFORMATION: interval scan      5mm axial sections of the brain were obtained from base to vertex,   without the intravenous administration of contrast material. Images were   obtained on a portable CT scanner and compared with 11/3/2024.      There has been a left frontal craniotomy. There is air in the subgaleal   space. There is residual mixed density subdural collection with air   unchanged since the prior exam. There is residual mass effect on the   cerebral cortical sulci and the left lateral ventricle without   significant midline shift to the right. Previous bilateral lens   replacement surgery.    IMPRESSION: No change since 11/3/2024. Left frontal craniotomy. Residual   mixed density subdural collection and air.    --- End of Report ---            NORMA NOONAN MD; Attending Radiologist  This document has been electronically signed. Nov 4 2024  9:18AM    < end of copied text >    < from: CT Head No Cont (12.04.24 @ 15:07) >    ACC: [id number] EXAM:  CT BRAIN   ORDERED BY: YOSEPH MOORE     PROCEDURE DATE:  12/04/2024          INTERPRETATION:  Exam Date: 12/4/2024 3:07 PM    CT head without IV contrast    CLINICAL INFORMATION: AMS    TECHNIQUE: Contiguous axial sections were obtained through the head.     Coronal and sagittal reformats were obtained.    COMPARISON: CT head 11/4/2024    FINDINGS:  Status post left parietal craniotomy. There is an underlying mixed   attenuation subdural hematoma overlying the left frontal convexity   measuring up to 1.7 cm. No significant mass effect on the underlying   parenchyma.    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 Scattered   calcifications of the intracranial arteries. No hydrocephalus. No acute  infarct. There is periventricular and subcortical white matter   hypoattenuation,  most compatible with chronic microvascular ischemic   changes.    The visualized portions of the paranasal sinuses and mastoid air cells   are clear.    IMPRESSION:    Status post left parietal craniotomy. There is an underlying mixed   attenuation subdural hematoma overlying the left frontal convexity   measuring up to 1.7 cm. No significant mass effect on the underlying   parenchyma.    Chronic changes as above.      Critical value:  I discussed the finding of this report with the provider   Renay at 3:18 PM on 12/4/2024.  Critical value policy of the hospital   was followed.  Read back and confirmation of receipt of this   communication was performed.This verbal communication supplements the   text report of this document.          --- End of Report ---            CURT HODGE MD; Attending Radiologist  This document has been electronically signed. Dec  4 2024  3:21PM    < end of copied text >      
Podiatry pager #: [phone]/ [phone]    Patient is a 89y old  Male who presents with a chief complaint of Transferred from the [facility] ED to [facility] for subdural hematoma (05 Dec 2024 12:56)Podiatry consultation for evaluation of bilateral lower extremity wounds      HPI:  NIGHT HOSPITALIST:    Patient UNKNOWN to me previously, assigned to me at this point via the ER and by Dr. Bowden to admit this 89 y/o M--patient with moderate to severe cognitive impairment, history of essential HTN, with prior hospitalization in Aug 2024 for UTI/pyelo with apparent LEFT hydroureteronephrosis and S/P emergent cystoscopy and LEFT ureteral stent placement with patient S/P LEFT craniotomy and subdural haematoma evacuation 11/2/24 at [facility] following initial confusional state, with course complicated with delirium and pulmonary oedema.   Echo at 11/3/24 with LV systolic function with mild to moderate decreased with EF of 40% with regional wall abnormalities present, with patient at the time maintained on valproic acid for mood stabilization (not for seizures) with Seroquel stopped at the time and reduction of Trazodone to 50 mg from 100 mg, with patient on Namenda.  Patient was seen by Palliative Care at [facility] in Aug 2024 wit patient DNR/ Do Not Intubate per note but MOLST obtained from [facility] from [facility] unable to ascertain selected option on form signed by the patient's daughter, Mathew Brennan.  I left a general message with the daughter by voice mail with my name and office number only.  Unable to obtain history from patient.    Patient is oriented to self/hospital (?) with patient receiving haloperidol earlier in the ER.  Patient presently calm and offers no complaint.    Patient with report of increased confusion at [facility] at [facility]--[facility] discharge. (05 Dec 2024 02:11)      PAST MEDICAL & SURGICAL HISTORY:  HLD (hyperlipidemia)      HTN (hypertension)      PVD (peripheral vascular disease)      CAD (coronary artery disease)      Atherosclerosis      Dementia      S/P subdural hematoma evacuation          MEDICATIONS  (STANDING):  cefTRIAXone   IVPB 1000 milliGRAM(s) IV Intermittent every 24 hours  dextrose 5%. 1000 milliLiter(s) (100 mL/Hr) IV Continuous <Continuous>  dextrose 5%. 1000 milliLiter(s) (50 mL/Hr) IV Continuous <Continuous>  dextrose 50% Injectable 25 Gram(s) IV Push once  dextrose 50% Injectable 12.5 Gram(s) IV Push once  dextrose 50% Injectable 25 Gram(s) IV Push once  doxazosin 2 milliGRAM(s) Oral at bedtime  glucagon  Injectable 1 milliGRAM(s) IntraMuscular once  lactobacillus acidophilus 1 Tablet(s) Oral daily  metoprolol succinate ER 25 milliGRAM(s) Oral daily  valproic acid 250 milliGRAM(s) Oral every 8 hours    MEDICATIONS  (PRN):  dextrose Oral Gel 15 Gram(s) Oral once PRN Blood Glucose LESS THAN 70 milliGRAM(s)/deciliter      Allergies    No Known Allergies    Intolerances        VITALS:    Vital Signs Last 24 Hrs  T(C): 36.6 (05 Dec 2024 04:41), Max: 37 (04 Dec 2024 13:54)  T(F): 97.8 (05 Dec 2024 04:41), Max: 98.6 (04 Dec 2024 13:54)  HR: 90 (05 Dec 2024 04:41) (72 - 90)  BP: 150/76 (05 Dec 2024 04:41) (132/71 - 169/96)  BP(mean): 97 (05 Dec 2024 00:20) (97 - 97)  RR: 17 (05 Dec 2024 04:41) (17 - 18)  SpO2: 96% (05 Dec 2024 04:41) (94% - 100%)    Parameters below as of 05 Dec 2024 04:41  Patient On (Oxygen Delivery Method): room air        LABS:                          10.1   9.72  )-----------( 146      ( 05 Dec 2024 07:07 )             31.6       12-05    143  |  109[H]  |  41[H]  ----------------------------<  72  4.4   |  18[L]  |  0.85    Ca    8.7      05 Dec 2024 07:05    TPro  7.0  /  Alb  3.1[L]  /  TBili  0.5  /  DBili  x   /  AST  37  /  ALT  33  /  AlkPhos  86  12-04      CAPILLARY BLOOD GLUCOSE      POCT Blood Glucose.: 97 mg/dL (05 Dec 2024 12:12)  POCT Blood Glucose.: 89 mg/dL (05 Dec 2024 06:49)      PT/INR - ( 04 Dec 2024 13:40 )   PT: 12.5 sec;   INR: 1.06 ratio         PTT - ( 04 Dec 2024 13:40 )  PTT:29.9 sec    LOWER EXTREMITY PHYSICAL EXAM:    Vasular: DP/PT _1/4, B/L, CFT <3seconds B/L, Temperature gradient _wnl, B/L.   Neuro: Epicritic sensation _intact  to the level of _toes, B/L.  Musculoskeletal/Ortho: contracted bilateral LE  Skin: Right anterior shin superficial ulceration measuring 1 cm in diameter with red granular tissue.  No periwound erythema purulence fluctuance malodor or clinical signs of infection.  Bilateral heels early signs of DTI with blanchable erythema.  No bulla no fluctuance no malodor no breaks in skin or clinical signs of infection.      RADIOLOGY & ADDITIONAL STUDIES:    
Patient is a 89y old  Male who presents with a chief complaint of Transferred from the [place] ED to [place] for subdural haematoma (08 Dec 2024 09:13)    Date of servie : 12-08-24 @ 14:16  INTERVAL HPI/OVERNIGHT EVENTS:  T(C): 36.4 (12-08-24 @ 11:56), Max: 36.8 (12-08-24 @ 00:13)  HR: 99 (12-08-24 @ 11:56) (74 - 99)  BP: 138/89 (12-08-24 @ 11:56) (103/78 - 181/92)  RR: 18 (12-08-24 @ 11:56) (18 - 19)  SpO2: 97% (12-08-24 @ 11:56) (96% - 99%)  Wt(kg): --  I&O's Summary      LABS:                        11.3   4.59  )-----------( 190      ( 07 Dec 2024 07:09 )             34.7     12-08    149[H]  |  113[H]  |  50[H]  ----------------------------<  79  3.9   |  21[L]  |  0.47[L]    Ca    9.0      08 Dec 2024 07:21        Urinalysis Basic - ( 08 Dec 2024 07:21 )    Color: x / Appearance: x / SG: x / pH: x  Gluc: 79 mg/dL / Ketone: x  / Bili: x / Urobili: x   Blood: x / Protein: x / Nitrite: x   Leuk Esterase: x / RBC: x / WBC x   Sq Epi: x / Non Sq Epi: x / Bacteria: x      CAPILLARY BLOOD GLUCOSE            Urinalysis Basic - ( 08 Dec 2024 07:21 )    Color: x / Appearance: x / SG: x / pH: x  Gluc: 79 mg/dL / Ketone: x  / Bili: x / Urobili: x   Blood: x / Protein: x / Nitrite: x   Leuk Esterase: x / RBC: x / WBC x   Sq Epi: x / Non Sq Epi: x / Bacteria: x        MEDICATIONS  (STANDING):  atorvastatin 10 milliGRAM(s) Oral at bedtime  dextrose 5%. 1000 milliLiter(s) (100 mL/Hr) IV Continuous <Continuous>  dextrose 5%. 1000 milliLiter(s) (50 mL/Hr) IV Continuous <Continuous>  dextrose 5%. 1000 milliLiter(s) (50 mL/Hr) IV Continuous <Continuous>  dextrose 50% Injectable 25 Gram(s) IV Push once  dextrose 50% Injectable 12.5 Gram(s) IV Push once  dextrose 50% Injectable 25 Gram(s) IV Push once  divalproex Sprinkle 250 milliGRAM(s) Oral every 8 hours  doxazosin 2 milliGRAM(s) Oral at bedtime  glucagon  Injectable 1 milliGRAM(s) IntraMuscular once  lactobacillus acidophilus 1 Tablet(s) Oral daily  memantine 5 milliGRAM(s) Oral daily  metoprolol succinate ER 25 milliGRAM(s) Oral daily  QUEtiapine 12.5 milliGRAM(s) Oral daily  sodium chloride 0.45%. 1000 milliLiter(s) (60 mL/Hr) IV Continuous <Continuous>  traZODone 50 milliGRAM(s) Oral at bedtime    MEDICATIONS  (PRN):  ALPRAZolam 0.25 milliGRAM(s) Oral two times a day PRN for anxiety  dextrose Oral Gel 15 Gram(s) Oral once PRN Blood Glucose LESS THAN 70 milliGRAM(s)/deciliter          PHYSICAL EXAM:  GENERAL: frail, restless  CHEST/LUNG: Clear to percussion bilaterally; No rales, rhonchi, wheezing, or rubs  HEART: Regular rate and rhythm; No murmurs, rubs, or gallops  ABDOMEN: Soft, Nontender, Nondistended; Bowel sounds present  EXTREMITIES:  2+ Peripheral Pulses, No clubbing, cyanosis, or edema  LYMPH: No lymphadenopathy noted  SKIN: No rashes or lesions    Care Discussed with Consultants/Other Providers [ ] YES  [ ] NO
Patient is a 89y old  Male who presents with a chief complaint of Transferred from the [facility] ED to [facility] for subdural haematoma (06 Dec 2024 13:11)    Date of servie : 12-06-24 @ 15:23  INTERVAL HPI/OVERNIGHT EVENTS:  T(C): 36.3 (12-06-24 @ 12:00), Max: 36.6 (12-06-24 @ 00:16)  HR: 81 (12-06-24 @ 12:00) (79 - 95)  BP: 141/73 (12-06-24 @ 12:00) (124/82 - 166/86)  RR: 18 (12-06-24 @ 12:00) (18 - 18)  SpO2: 98% (12-06-24 @ 12:00) (94% - 98%)  Wt(kg): --  I&O's Summary      LABS:                        10.6   6.40  )-----------( 168      ( 06 Dec 2024 07:08 )             32.9     12-05    143  |  109[H]  |  41[H]  ----------------------------<  72  4.4   |  18[L]  |  0.85    Ca    8.7      05 Dec 2024 07:05        Urinalysis Basic - ( 05 Dec 2024 07:05 )    Color: x / Appearance: x / SG: x / pH: x  Gluc: 72 mg/dL / Ketone: x  / Bili: x / Urobili: x   Blood: x / Protein: x / Nitrite: x   Leuk Esterase: x / RBC: x / WBC x   Sq Epi: x / Non Sq Epi: x / Bacteria: x      CAPILLARY BLOOD GLUCOSE      POCT Blood Glucose.: 83 mg/dL (06 Dec 2024 11:57)  POCT Blood Glucose.: 111 mg/dL (06 Dec 2024 06:03)  POCT Blood Glucose.: 95 mg/dL (06 Dec 2024 03:28)  POCT Blood Glucose.: 88 mg/dL (06 Dec 2024 02:13)  POCT Blood Glucose.: 84 mg/dL (06 Dec 2024 00:48)  POCT Blood Glucose.: 89 mg/dL (05 Dec 2024 19:43)  POCT Blood Glucose.: 95 mg/dL (05 Dec 2024 19:34)        Urinalysis Basic - ( 05 Dec 2024 07:05 )    Color: x / Appearance: x / SG: x / pH: x  Gluc: 72 mg/dL / Ketone: x  / Bili: x / Urobili: x   Blood: x / Protein: x / Nitrite: x   Leuk Esterase: x / RBC: x / WBC x   Sq Epi: x / Non Sq Epi: x / Bacteria: x        MEDICATIONS  (STANDING):  cefTRIAXone   IVPB 1000 milliGRAM(s) IV Intermittent every 24 hours  dextrose 5%. 1000 milliLiter(s) (100 mL/Hr) IV Continuous <Continuous>  dextrose 5%. 1000 milliLiter(s) (50 mL/Hr) IV Continuous <Continuous>  dextrose 5%. 1000 milliLiter(s) (50 mL/Hr) IV Continuous <Continuous>  dextrose 50% Injectable 25 Gram(s) IV Push once  dextrose 50% Injectable 12.5 Gram(s) IV Push once  dextrose 50% Injectable 25 Gram(s) IV Push once  doxazosin 2 milliGRAM(s) Oral at bedtime  glucagon  Injectable 1 milliGRAM(s) IntraMuscular once  lactobacillus acidophilus 1 Tablet(s) Oral daily  metoprolol succinate ER 25 milliGRAM(s) Oral daily  valproic acid 250 milliGRAM(s) Oral every 8 hours    MEDICATIONS  (PRN):  dextrose Oral Gel 15 Gram(s) Oral once PRN Blood Glucose LESS THAN 70 milliGRAM(s)/deciliter          PHYSICAL EXAM:  GENERAL: NAD, well-groomed, well-developed  HEAD:  Atraumatic, Normocephalic  CHEST/LUNG: Clear to percussion bilaterally; No rales, rhonchi, wheezing, or rubs  HEART: Regular rate and rhythm; No murmurs, rubs, or gallops  ABDOMEN: Soft, Nontender, Nondistended; Bowel sounds present  EXTREMITIES:  2+ Peripheral Pulses, No clubbing, cyanosis, or edema  LYMPH: No lymphadenopathy noted  SKIN: No rashes or lesions    Care Discussed with Consultants/Other Providers [x ] YES  [ ] NO
Subjective: Patient seen and examined. No new events except as noted.     REVIEW OF SYSTEMS:    Unable to obtain     MEDICATIONS:  MEDICATIONS  (STANDING):  atorvastatin 10 milliGRAM(s) Oral at bedtime  dextrose 5%. 1000 milliLiter(s) (100 mL/Hr) IV Continuous <Continuous>  dextrose 5%. 1000 milliLiter(s) (50 mL/Hr) IV Continuous <Continuous>  dextrose 5%. 1000 milliLiter(s) (50 mL/Hr) IV Continuous <Continuous>  dextrose 50% Injectable 25 Gram(s) IV Push once  dextrose 50% Injectable 12.5 Gram(s) IV Push once  dextrose 50% Injectable 25 Gram(s) IV Push once  divalproex Sprinkle 250 milliGRAM(s) Oral every 8 hours  doxazosin 2 milliGRAM(s) Oral at bedtime  glucagon  Injectable 1 milliGRAM(s) IntraMuscular once  lactobacillus acidophilus 1 Tablet(s) Oral daily  memantine 5 milliGRAM(s) Oral daily  metoprolol succinate ER 25 milliGRAM(s) Oral daily  QUEtiapine 12.5 milliGRAM(s) Oral daily  traZODone 50 milliGRAM(s) Oral at bedtime      PHYSICAL EXAM:  T(C): 36.7 (12-07-24 @ 16:35), Max: 36.8 (12-07-24 @ 04:27)  HR: 87 (12-07-24 @ 16:35) (72 - 98)  BP: 103/78 (12-07-24 @ 16:35) (103/78 - 188/91)  RR: 18 (12-07-24 @ 16:35) (18 - 18)  SpO2: 99% (12-07-24 @ 16:35) (93% - 100%)  Wt(kg): --  I&O's Summary          Appearance: NAD   HEENT:   Normal oral mucosa, PERRL, EOMI	  Lymphatic: No lymphadenopathy  Cardiovascular: Normal S1 S2, No JVD, No murmurs, No edema  Respiratory: decreased bs  Psychiatry: A & O x 1  Gastrointestinal:  Soft, Non-tender, + BS	  Skin: No rashes, No ecchymoses, No cyanosis	  Neurologic: Awake, confused, oriented to self, PERRL, EOMI, no facial, FC, frail, BUE 4+/5, no drift, BLE 4+/5    Extremities: Normal range of motion, No clubbing, cyanosis or edema  Vascular: Peripheral pulses palpable 2+ bilaterally          LABS:    CARDIAC MARKERS:                                11.3   4.59  )-----------( 190      ( 07 Dec 2024 07:09 )             34.7     12-07    149[H]  |  112[H]  |  55[H]  ----------------------------<  80  4.2   |  22  |  1.07    Ca    9.3      07 Dec 2024 07:09      proBNP:   Lipid Profile:   HgA1c:   TSH: Thyroid Stimulating Hormone, Serum: 0.11 uIU/mL (12-07 @ 07:09)              TELEMETRY: 	    ECG:  	  RADIOLOGY:   DIAGNOSTIC TESTING:  [ ] Echocardiogram:  [ ]  Catheterization:  [ ] Stress Test:    OTHER: 	          
Subjective: Patient seen and examined. No new events except as noted.     REVIEW OF SYSTEMS:    CONSTITUTIONAL:+ weakness, fevers or chills  EYES/ENT: No visual changes;  No vertigo or throat pain   NECK: No pain or stiffness  RESPIRATORY: No cough, wheezing, hemoptysis; No shortness of breath  CARDIOVASCULAR: No chest pain or palpitations  GASTROINTESTINAL: No abdominal or epigastric pain. No nausea, vomiting, or hematemesis; No diarrhea or constipation. No melena or hematochezia.  GENITOURINARY: No dysuria, frequency or hematuria  NEUROLOGICAL: No numbness or weakness  SKIN: No itching, burning, rashes, or lesions   All other review of systems is negative unless indicated above.    MEDICATIONS:  MEDICATIONS  (STANDING):  atorvastatin 10 milliGRAM(s) Oral at bedtime  dextrose 5%. 1000 milliLiter(s) (100 mL/Hr) IV Continuous <Continuous>  dextrose 5%. 1000 milliLiter(s) (50 mL/Hr) IV Continuous <Continuous>  dextrose 5%. 1000 milliLiter(s) (50 mL/Hr) IV Continuous <Continuous>  dextrose 50% Injectable 25 Gram(s) IV Push once  dextrose 50% Injectable 12.5 Gram(s) IV Push once  dextrose 50% Injectable 25 Gram(s) IV Push once  divalproex Sprinkle 250 milliGRAM(s) Oral every 8 hours  doxazosin 2 milliGRAM(s) Oral at bedtime  glucagon  Injectable 1 milliGRAM(s) IntraMuscular once  lactobacillus acidophilus 1 Tablet(s) Oral daily  memantine 5 milliGRAM(s) Oral daily  metoprolol succinate ER 25 milliGRAM(s) Oral daily  QUEtiapine 12.5 milliGRAM(s) Oral daily  sodium chloride 0.45%. 1000 milliLiter(s) (60 mL/Hr) IV Continuous <Continuous>  traZODone 50 milliGRAM(s) Oral at bedtime      PHYSICAL EXAM:  T(C): 36.6 (12-09-24 @ 16:53), Max: 36.6 (12-09-24 @ 16:53)  HR: 89 (12-09-24 @ 16:53) (86 - 115)  BP: 136/83 (12-09-24 @ 16:53) (96/59 - 137/84)  RR: 19 (12-09-24 @ 16:53) (18 - 19)  SpO2: 96% (12-09-24 @ 16:53) (96% - 100%)  Wt(kg): --  I&O's Summary        Appearance: NAD   HEENT:   Normal oral mucosa, PERRL, EOMI	  Lymphatic: No lymphadenopathy  Cardiovascular: Normal S1 S2, No JVD, No murmurs, No edema  Respiratory: decreased bs  Psychiatry: A & O x 1  Gastrointestinal:  Soft, Non-tender, + BS	  Skin: No rashes, No ecchymoses, No cyanosis	  Neurologic: Awake, confused, oriented to self, PERRL, EOMI, no facial, FC, frail, BUE 4+/5, no drift, BLE 4+/5    Extremities: Normal range of motion, No clubbing, cyanosis or edema  Vascular: Peripheral pulses palpable 2+ bilaterally            LABS:    CARDIAC MARKERS:            12-08    149[H]  |  113[H]  |  50[H]  ----------------------------<  79  3.9   |  21[L]  |  0.47[L]    Ca    9.0      08 Dec 2024 07:21      proBNP:   Lipid Profile:   HgA1c:   TSH:             TELEMETRY: 	    ECG:  	  RADIOLOGY:   DIAGNOSTIC TESTING:  [ ] Echocardiogram:  [ ]  Catheterization:  [ ] Stress Test:    OTHER:

## 2024-12-10 NOTE — PROGRESS NOTE ADULT - PROBLEM SELECTOR PLAN 2
Nsx consulted   stable for now.

## 2024-12-10 NOTE — PROGRESS NOTE ADULT - PROBLEM SELECTOR PROBLEM 2
S/P subdural hematoma evacuation

## 2025-01-30 NOTE — ED PROVIDER NOTE - PSYCHIATRIC, MLM
Copied from CRM #[id number]. Topic: MW Medication/Rx - MW Rx Refill  >> Jan 30, 2025  1:29 PM Cher MELENDREZ wrote:  Honey Jimenez called to request a medication refill and is out of medications or critically low during working hours. Medication is:  Listed on Med Management list. Care Site to process refill: Selected 'Wrap Up CRM' and created new Refill Med Encounter after clicking 'Convert to Clinical Call'. Selected reason for call 'Refill Request'. Pended medication. Sent Med template and routed as high priority to appropriate clinician pool. Readback full message.    -- DO NOT REPLY / DO NOT REPLY ALL --  -- This inbox is not monitored. If this was sent to the wrong provider or department, reroute message to P ECO Reroute pool. --  -- Message is from Engagement Center Operations (ECO) --      Message Type:  Refill Medication   Refill request for Pended medication named: levothyroxine  Preferred pharmacy verified, and selected.   [id number] - [facility] [address] AT [facility] [address]    Is the patient OUT of Medication?  Yes and During Work Hours Medication Refills handled by Care Site - route as high priority to care site pool on KB. Patient has been advised it will be addressed within 1 business day.     Message: Patient needs refill.                   Alert and oriented to person, place, time/situation. normal mood and affect. no apparent risk to self or others.

## 2025-07-01 NOTE — PATIENT PROFILE ADULT - VISION (WITH CORRECTIVE LENSES IF THE PATIENT USUALLY WEARS THEM):
Pt emailed the following “I will be absent for tonight’s session. I have left messages with both therapists.” At 13.48 and writer not fired the EI[facility] of his absence tena. Niyah Louise LCSW  
Partially impaired: cannot see medication labels or newsprint, but can see obstacles in path, and the surrounding layout; can count fingers at arm's length

## (undated) DEVICE — BIPOLAR FORCEP STRYKER STANDARD 7" X 0.5MM (YELLOW)

## (undated) DEVICE — ELCTR BOVIE TIP BLADE INSULATED 2.75" EDGE

## (undated) DEVICE — ELCTR GROUNDING PAD ADULT COVIDIEN

## (undated) DEVICE — SUT VICRYL PLUS 2-0 18" CP-2 UNDYED (POP-OFF)

## (undated) DEVICE — DRAPE 3/4 SHEET W REINFORCEMENT 56X77"

## (undated) DEVICE — PACK CYSTO

## (undated) DEVICE — ELCTR BIPOLAR CORD J&J 12FT DISP

## (undated) DEVICE — Device

## (undated) DEVICE — VENODYNE/SCD SLEEVE CALF LARGE

## (undated) DEVICE — PLV-SCD MACHINE: Type: DURABLE MEDICAL EQUIPMENT

## (undated) DEVICE — ACRA-CUT CRANIAL PERFORATOR W/HUDSON END 14MM X 11MM (BLUE)

## (undated) DEVICE — DRAPE XRAY CASSETTE COVER DOUBLE 20X40"

## (undated) DEVICE — DRAPE TOWEL BLUE 17" X 24"

## (undated) DEVICE — SUT VICRYL PLUS 3-0 18" X-1

## (undated) DEVICE — MIDAS REX MR8 BALL FLUTED SM BORE 6MM X 10CM

## (undated) DEVICE — PREP CHLORAPREP HI-LITE ORANGE 26ML

## (undated) DEVICE — DRSG XEROFORM 1 X 8"

## (undated) DEVICE — SOL IRR BAG H2O 3000ML

## (undated) DEVICE — STAPLER SKIN VISI-STAT 35 WIDE

## (undated) DEVICE — SYR LUER LOK 10CC

## (undated) DEVICE — DRAPE C ARM UNIVERSAL

## (undated) DEVICE — DRAPE DRAINAGE BAG URO CATCH II

## (undated) DEVICE — GOWN LG

## (undated) DEVICE — SYR LUER LOK 30CC

## (undated) DEVICE — WARMING BLANKET UPPER ADULT

## (undated) DEVICE — VENODYNE/SCD SLEEVE CALF MEDIUM

## (undated) DEVICE — BATTERY PACK VARISPEED SINGLE USE

## (undated) DEVICE — DRSG CURITY GAUZE SPONGE 4 X 4" 12-PLY

## (undated) DEVICE — MEDTRONIC AXIEM STYLET 23CM

## (undated) DEVICE — FOLEY TRAY 16FR LF URINE METER SURESTEP

## (undated) DEVICE — GLV 7.5 PROTEXIS (WHITE)

## (undated) DEVICE — SUT MONOSOF 3-0 18" C-14

## (undated) DEVICE — MEDTRONIC AXIEM PATIENT TRACKER NON-INVASIVE

## (undated) DEVICE — MEDTRONIC AXIEM TRACER POINTER

## (undated) DEVICE — DRAPE 1/2 SHEET 40X57"

## (undated) DEVICE — SOL INJ NS 0.9% 1000ML

## (undated) DEVICE — DRSG TELFA 3 X 8

## (undated) DEVICE — GLV 8 PROTEXIS (WHITE)

## (undated) DEVICE — MEDTRONIC AXIEM NAVIGATION POINTER

## (undated) DEVICE — POSITIONER FOAM EGG CRATE ULNAR 2PCS (PINK)

## (undated) DEVICE — SOL IRR POUR H2O 250ML

## (undated) DEVICE — ELCTR BOVIE PENCIL SMOKE EVACUATION

## (undated) DEVICE — SOL IRR POUR NS 0.9% 500ML

## (undated) DEVICE — MEDICATION LABELS W MARKER

## (undated) DEVICE — WARMING BLANKET LOWER ADULT